# Patient Record
Sex: MALE | Race: WHITE | Employment: FULL TIME | URBAN - METROPOLITAN AREA
[De-identification: names, ages, dates, MRNs, and addresses within clinical notes are randomized per-mention and may not be internally consistent; named-entity substitution may affect disease eponyms.]

---

## 2017-05-30 ENCOUNTER — HOSPITAL ENCOUNTER (OUTPATIENT)
Dept: CT IMAGING | Age: 57
Discharge: HOME | End: 2017-05-30
Attending: PHYSICIAN ASSISTANT
Payer: COMMERCIAL

## 2017-05-30 DIAGNOSIS — R91.1 PULMONARY NODULE: ICD-10-CM

## 2017-05-30 PROCEDURE — 71250 CT THORAX DX C-: CPT

## 2017-12-18 ENCOUNTER — OFFICE VISIT (OUTPATIENT)
Dept: FAMILY MEDICINE CLINIC | Age: 57
End: 2017-12-18

## 2017-12-18 VITALS
OXYGEN SATURATION: 94 % | HEIGHT: 73 IN | DIASTOLIC BLOOD PRESSURE: 76 MMHG | TEMPERATURE: 98.1 F | BODY MASS INDEX: 33.5 KG/M2 | WEIGHT: 252.8 LBS | SYSTOLIC BLOOD PRESSURE: 114 MMHG | RESPIRATION RATE: 16 BRPM | HEART RATE: 82 BPM

## 2017-12-18 DIAGNOSIS — R31.21 ASYMPTOMATIC MICROSCOPIC HEMATURIA: ICD-10-CM

## 2017-12-18 DIAGNOSIS — E66.9 OBESITY (BMI 30-39.9): ICD-10-CM

## 2017-12-18 DIAGNOSIS — E78.00 HYPERCHOLESTEROLEMIA: Chronic | ICD-10-CM

## 2017-12-18 DIAGNOSIS — Z00.00 ROUTINE GENERAL MEDICAL EXAMINATION AT A HEALTH CARE FACILITY: Primary | ICD-10-CM

## 2017-12-18 DIAGNOSIS — Z23 ENCOUNTER FOR IMMUNIZATION: ICD-10-CM

## 2017-12-18 DIAGNOSIS — Z12.11 SCREEN FOR COLON CANCER: ICD-10-CM

## 2017-12-18 DIAGNOSIS — L84 CORN OF FOOT: ICD-10-CM

## 2017-12-18 DIAGNOSIS — R15.9 FUNCTIONAL FECAL INCONTINENCE: ICD-10-CM

## 2017-12-18 LAB
BILIRUB UR QL STRIP: NEGATIVE
GLUCOSE UR-MCNC: NEGATIVE MG/DL
KETONES P FAST UR STRIP-MCNC: NEGATIVE MG/DL
PH UR STRIP: 6.5 [PH] (ref 4.6–8)
PROT UR QL STRIP: NEGATIVE
SP GR UR STRIP: 1.02 (ref 1–1.03)
UA UROBILINOGEN AMB POC: NORMAL (ref 0.2–1)
URINALYSIS CLARITY POC: CLEAR
URINALYSIS COLOR POC: YELLOW
URINE BLOOD POC: NORMAL
URINE LEUKOCYTES POC: NEGATIVE
URINE NITRITES POC: NEGATIVE

## 2017-12-18 NOTE — LETTER
12/18/2017 9:25 AM 
 
Mr. Zahira Hancock [address] [place] [address], [phone] To Whom It May Concern: 
 
Zahira Hancock is currently under the care of WES[facility] Roberts. He was seen on 12/18/17 for an annual physical exam. 
 
 
Sincerely, Merced Judd NP

## 2017-12-18 NOTE — PATIENT INSTRUCTIONS
Vaccine Information Statement    Influenza (Flu) Vaccine (Inactivated or Recombinant): What you need to know    Many Vaccine Information Statements are available in Indonesian and other languages. See www.immunize.org/vis  Hojas de Información Sobre Vacunas están disponibles en Español y en muchos otros idiomas. Visite www.immunize.org/vis    1. Why get vaccinated? Influenza (flu) is a contagious disease that spreads around the [place] every year, usually between October and May. Flu is caused by influenza viruses, and is spread mainly by coughing, sneezing, and close contact. Anyone can get flu. Flu strikes suddenly and can last several days. Symptoms vary by age, but can include:   fever/chills   sore throat   muscle aches   fatigue   cough   headache    runny or stuffy nose    Flu can also lead to pneumonia and blood infections, and cause diarrhea and seizures in children. If you have a medical condition, such as heart or lung disease, flu can make it worse. Flu is more dangerous for some people. Infants and young children, people 72years of age and older, pregnant women, and people with certain health conditions or a weakened immune system are at greatest risk. Each year thousands of people in the [facility] die from flu, and many more are hospitalized. Flu vaccine can:   keep you from getting flu,   make flu less severe if you do get it, and   keep you from spreading flu to your family and other people. 2. Inactivated and recombinant flu vaccines    A dose of flu vaccine is recommended every flu season. Children 6 months through 6years of age may need two doses during the same flu season. Everyone else needs only one dose each flu season.        Some inactivated flu vaccines contain a very small amount of a mercury-based preservative called thimerosal. Studies have not shown thimerosal in vaccines to be harmful, but flu vaccines that do not contain thimerosal are available. There is no live flu virus in flu shots. They cannot cause the flu. There are many flu viruses, and they are always changing. Each year a new flu vaccine is made to protect against three or four viruses that are likely to cause disease in the upcoming flu season. But even when the vaccine doesnt exactly match these viruses, it may still provide some protection    Flu vaccine cannot prevent:   flu that is caused by a virus not covered by the vaccine, or   illnesses that look like flu but are not. It takes about 2 weeks for protection to develop after vaccination, and protection lasts through the flu season. 3. Some people should not get this vaccine    Tell the person who is giving you the vaccine:     If you have any severe, life-threatening allergies. If you ever had a life-threatening allergic reaction after a dose of flu vaccine, or have a severe allergy to any part of this vaccine, you may be advised not to get vaccinated. Most, but not all, types of flu vaccine contain a small amount of egg protein.  If you ever had Guillain-Barré Syndrome (also called GBS). Some people with a history of GBS should not get this vaccine. This should be discussed with your doctor.  If you are not feeling well. It is usually okay to get flu vaccine when you have a mild illness, but you might be asked to come back when you feel better. 4. Risks of a vaccine reaction    With any medicine, including vaccines, there is a chance of reactions. These are usually mild and go away on their own, but serious reactions are also possible. Most people who get a flu shot do not have any problems with it.      Minor problems following a flu shot include:    soreness, redness, or swelling where the shot was given     hoarseness   sore, red or itchy eyes   cough   fever   aches   headache   itching   fatigue  If these problems occur, they usually begin soon after the shot and last 1 or 2 days. More serious problems following a flu shot can include the following:     There may be a small increased risk of Guillain-Barré Syndrome (GBS) after inactivated flu vaccine. This risk has been estimated at 1 or 2 additional cases per million people vaccinated. This is much lower than the risk of severe complications from flu, which can be prevented by flu vaccine.  Young children who get the flu shot along with pneumococcal vaccine (PCV13) and/or DTaP vaccine at the same time might be slightly more likely to have a seizure caused by fever. Ask your doctor for more information. Tell your doctor if a child who is getting flu vaccine has ever had a seizure. Problems that could happen after any injected vaccine:      People sometimes faint after a medical procedure, including vaccination. Sitting or lying down for about 15 minutes can help prevent fainting, and injuries caused by a fall. Tell your doctor if you feel dizzy, or have vision changes or ringing in the ears.  Some people get severe pain in the shoulder and have difficulty moving the arm where a shot was given. This happens very rarely.  Any medication can cause a severe allergic reaction. Such reactions from a vaccine are very rare, estimated at about 1 in a million doses, and would happen within a few minutes to a few hours after the vaccination. As with any medicine, there is a very remote chance of a vaccine causing a serious injury or death. The safety of vaccines is always being monitored. For more information, visit: www.cdc.gov/vaccinesafety/    5. What if there is a serious reaction? What should I look for?  Look for anything that concerns you, such as signs of a severe allergic reaction, very high fever, or unusual behavior.     Signs of a severe allergic reaction can include hives, swelling of the face and throat, difficulty breathing, a fast heartbeat, dizziness, and weakness - usually within a few minutes to a few hours after the vaccination. What should I do?  If you think it is a severe allergic reaction or other emergency that cant wait, call 9-1-1 and get the person to the nearest hospital. Otherwise, call your doctor.  Reactions should be reported to the Vaccine Adverse Event Reporting System (VAERS). Your doctor should file this report, or you can do it yourself through  the VAERS web site at www.vaers. [facility].gov, or by calling 3-591.609.3022. VAERS does not give medical advice. 6. The National Vaccine Injury Compensation Program    The [facility] Vaccine Injury Compensation Program (VICP) is a federal program that was created to compensate people who may have been injured by certain vaccines. Persons who believe they may have been injured by a vaccine can learn about the program and about filing a claim by calling 0-844.196.9565 or visiting the [employer] website at www.[facility].gov/vaccinecompensation. There is a time limit to file a claim for compensation. 7. How can I learn more?  Ask your healthcare provider. He or she can give you the vaccine package insert or suggest other sources of information.  Call your local or state health department.  Contact the Centers for Disease Control and Prevention (CDC):  - Call 6-220.439.7713 (1-800-CDC-INFO) or  - Visit CDCs website at www.cdc.gov/flu    Vaccine Information Statement   Inactivated Influenza Vaccine   8/7/2015  [address] [id number]    Department of Health and Human Services  Centers for Disease Control and Prevention    Office Use Only       Blood in the Urine: Care Instructions  Your Care Instructions    Blood in the urine, or hematuria, may make the urine look red, brown, or pink. There may be blood every time you urinate or just from time to time. You cannot always see blood in the urine, but it will show up in a urine test.  Blood in the urine may be serious. It should always be checked by a doctor.  Your doctor may recommend more tests, including an X-ray, a CT scan, or a cystoscopy (which lets a doctor look inside the urethra and bladder). Blood in the urine can be a sign of another problem. Common causes are bladder infections and kidney stones. An injury to your groin or your genital area can also cause bleeding in the urinary tract. Very hard exercise-such as running a marathon-can cause blood in the urine. Blood in the urine can also be a sign of kidney disease or cancer in the bladder or kidney. Many cases of blood in the urine are caused by a harmless condition that runs in families. This is called benign familial hematuria. It does not need any treatment. Sometimes your urine may look red or brown even though it does not contain blood. For example, not getting enough fluids (dehydration), taking certain medicines, or having a liver problem can change the color of your urine. Eating foods such as beets, rhubarb, or blackberries or foods with red food coloring can make your urine look red or pink. Follow-up care is a key part of your treatment and safety. Be sure to make and go to all appointments, and call your doctor if you are having problems. It's also a good idea to know your test results and keep a list of the medicines you take. When should you call for help? Call your doctor now or seek immediate medical care if:  · You have symptoms of a urinary infection. For example:  ¨ You have pus in your urine. ¨ You have pain in your back just below your rib cage. This is called flank pain. ¨ You have a fever, chills, or body aches. ¨ It hurts to urinate. ¨ You have groin or belly pain. · You have more blood in your urine. Watch closely for changes in your health, and be sure to contact your doctor if:  · You have new urination problems. · You do not get better as expected. Where can you learn more? Go to http://javed-shelton.info/.   Enter N627 in the search box to learn more about \"Blood in the Urine: Care Instructions. \"  Current as of: May 12, 2017  Content Version: 11.4  © 3919-5674 Healthwise, [employer]. Care instructions adapted under license by [employer] (which disclaims liability or warranty for this information). If you have questions about a medical condition or this instruction, always ask your healthcare professional. [place] [address] any warranty or liability for your use of this information.

## 2017-12-18 NOTE — PROGRESS NOTES
Chief Complaint   Patient presents with    Complete Physical    Foot Pain     patient states he has pain at the bottom of his left foot- comes and goes- started 2 month ago- 7/10 pain    Back Pain     patient states he has a tightening pain in his lower back- started 1 week ago- 6/10 pain       1. Have you been to the ER, urgent care clinic since your last visit? Hospitalized since your last visit? No    2. Have you seen or consulted any other health care providers outside of the [address] [place] [address] since your last visit? Include any pap smears or colon screening.  Yes- [facility]- 09/27/17

## 2017-12-18 NOTE — PROGRESS NOTES
Ana Garcia is a 62 y.o. male who was seen in clinic today (12/18/2017). Subjective:  Cardiovascular Review:  The patient has hyperlipidemia and obesity. Diet and Lifestyle: not attempting to follow a low fat, low cholesterol diet, not attempting to follow a low sodium diet, sedentary, nonsmoker  Home BP Monitoring: is not measured at home. Pertinent ROS: no TIA's, no chest pain on exertion, no dyspnea on exertion, no swelling of ankles. Patient reports ongoing fecal incontinence. Reports bending over, cough and straining frequently causing incontinence of stool. Reports normal bowel movements daily. Denies bleeding. Last colonoscopy at age 47 yo. Asthma Review:  The patient is being seen for follow up of asthma, not currently in exacerbation. Asthma symptoms occur: weekly, infrequently. Wheezing when present is described as mild-moderate and easily relieved with rescue bronchodilator. Current limitations in activity from asthma: none. Frequency of use of quick-relief meds: monthly. Using [employer] twice daily. Seen by pulmonology, Dr Shavon Vazquez. Patient does not smoke cigarettes. Foot Pain  Patient complains of left foot pain. Onset of the symptoms was 2 months ago. Inciting event: none known. Current symptoms include ability to bear weight, but with some pain and pain at the plantar aspect of the foot. Aggravating symptoms: standing, walking. Patient's overall course: symptoms have progressed to a point and plateaued. Prior to Admission medications    Medication Sig Start Date End Date Taking? Authorizing Provider   mometasone-formoterol (DULERA) 100-5 mcg/actuation HFA inhaler Take 2 Puffs by inhalation two (2) times a day. Yes Historical Provider   PROAIR HFA 90 mcg/actuation inhaler INHALE 2 INHALATIONS BY MOUTH EVERY 6 HOURS AS NEEDED FOR WHEEZING OR SHORTNESS OF BREATH 10/19/16  Yes Deepak Pickens, NP   OTHER Prime Secretagogue. Mix 1 scoop in 8-12 fl oz of water 6 times a week. Yes Historical Provider   ANTIOXIDANT cap Take 1 Packet by mouth daily. OPC 3   Yes Historical Provider   calcium-cholecalciferol, d3, (CALCIUM 600 + D) 600-125 mg-unit tab Take 1 Cap by mouth daily. Yes Historical Provider   ascorbic acid (VITAMIN C) 500 mg tablet Take 500 mg by mouth daily. Yes Historical Provider   naproxen (NAPROSYN) 500 mg tablet Take 1 Tab by mouth two (2) times daily (with meals). Patient taking differently: Take 500 mg by mouth two (2) times daily as needed. 11/4/15   Terrie Ta NP          Allergies   Allergen Reactions    Cortisone (Hydrocortisone) [Hydrocortisone] Herpetiformis Dermatitis     Reaction with eyes    Zocor [Simvastatin] Other (comments)     Bilateral leg cramps           Review of Systems   Constitutional: Negative for malaise/fatigue and weight loss. HENT: Negative for hearing loss. Eyes: Negative for blurred vision. Respiratory: Negative for shortness of breath. Cardiovascular: Negative for chest pain, palpitations and leg swelling. Gastrointestinal: Negative for abdominal pain, constipation, diarrhea and heartburn. Genitourinary: Negative for frequency and urgency. Musculoskeletal: Positive for back pain. Negative for joint pain and myalgias. Neurological: Negative for dizziness, weakness and headaches. Endo/Heme/Allergies: Does not bruise/bleed easily. Psychiatric/Behavioral: Negative for depression. All other systems reviewed and are negative. Objective:   Physical Exam   Constitutional: He is oriented to person, place, and time. He appears well-developed and well-nourished. No distress. HENT:   Right Ear: Tympanic membrane and ear canal normal.   Left Ear: Tympanic membrane and ear canal normal.   Nose: No mucosal edema. Right sinus exhibits no maxillary sinus tenderness and no frontal sinus tenderness. Left sinus exhibits no maxillary sinus tenderness and no frontal sinus tenderness.    Mouth/Throat: Oropharynx is clear and moist.   Eyes: EOM are normal. Pupils are equal, round, and reactive to light. Neck: Normal range of motion. Neck supple. No JVD present. Carotid bruit is not present. No thyromegaly present. Cardiovascular: Normal rate, regular rhythm, normal heart sounds and intact distal pulses. Exam reveals no gallop and no friction rub. No murmur heard. Pulmonary/Chest: Effort normal and breath sounds normal. No respiratory distress. He has no decreased breath sounds. He has no wheezes. He has no rhonchi. Abdominal: Soft. Bowel sounds are normal. He exhibits no distension. There is no tenderness. Musculoskeletal: He exhibits no edema. Feet:    Lymphadenopathy:     He has no cervical adenopathy. Neurological: He is alert and oriented to person, place, and time. Psychiatric: He has a normal mood and affect. His behavior is normal.   Nursing note and vitals reviewed. Urine dipstick shows positive for red blood cells. Visit Vitals    /76 (BP 1 Location: Left arm, BP Patient Position: Sitting)    Pulse 82    Temp 98.1 °F (36.7 °C) (Oral)    Resp 16    Ht 6' 1\" (1.854 m)    Wt 252 lb 12.8 oz (114.7 kg)    SpO2 94%    BMI 33.35 kg/m2       Assessment & Plan:  Diagnoses and all orders for this visit:    1. Routine general medical examination at a health care facility  -     CBC W/O DIFF  -     METABOLIC PANEL, COMPREHENSIVE  -     LIPID PANEL  -     HEMOGLOBIN A1C W/O EAG  -     PSA W/ REFLX FREE PSA  -     AMB POC URINALYSIS DIP STICK AUTO W/O MICRO    2. Obesity (BMI 30-39. 9)  Discussed need for weight loss through diet and exercise. Reviewed decreased caloric intake and increased activity. 3. Asymptomatic microscopic hematuria  -     REFERRAL TO UROLOGY    4. Hypercholesterolemia  Check labs. Reviewed diet and lifestyle changes. 5. Functional fecal incontinence  -     REFERRAL TO GASTROENTEROLOGY    6. [place] of foot  -     REFERRAL TO PODIATRY    7.  Encounter for immunization  - Influenza virus vaccine (QUADRIVALENT PRES FREE SYRINGE) IM (73514)  -     ME IMMUNIZ ADMIN,1 SINGLE/COMB VAC/TOXOID    8. Screen for colon cancer  -     REFERRAL TO GASTROENTEROLOGY      I have discussed the diagnosis with the patient and the intended plan as seen in the above orders. The patient has received an after-visit summary along with patient information handout. I have discussed medication side effects and warnings with the patient as well. Follow-up Disposition:  Return in about 1 year (around 12/18/2018) for Annual Exam - 30 minutes.         Bright Falcon, NP

## 2017-12-18 NOTE — MR AVS SNAPSHOT
Visit Information Date & Time Provider Department Dept. Phone Encounter #  
 12/18/2017  8:30 AM Cherie Posadas  [facility] [phone] [id number] Follow-up Instructions Return in about 1 year (around 12/18/2018) for Annual Exam - 30 minutes. Upcoming Health Maintenance Date Due DTaP/Tdap/Td series (1 - Tdap) 1/17/1981 Influenza Age 5 to Adult 8/1/2017 COLONOSCOPY 1/26/2018* *Topic was postponed. The date shown is not the original due date. Allergies as of 12/18/2017  Review Complete On: 12/18/2017 By: Cherie Posadas NP Severity Noted Reaction Type Reactions Cortisone (Hydrocortisone) [Hydrocortisone] High 06/04/2015   Intolerance Herpetiformis Dermatitis Reaction with eyes Zocor [Simvastatin]  06/04/2015    Other (comments) Bilateral leg cramps Current Immunizations  Reviewed on 7/12/2016 Name Date Influenza Vaccine (Quad) PF  Incomplete, 11/4/2015 Not reviewed this visit You Were Diagnosed With   
  
 Codes Comments Routine general medical examination at a health care facility    -  Primary ICD-10-CM: Z00.00 ICD-9-CM: V70.0 Obesity (BMI 30-39. 9)     ICD-10-CM: E66.9 ICD-9-CM: 278.00 Asymptomatic microscopic hematuria     ICD-10-CM: R31.21 
ICD-9-CM: 599.72 Hypercholesterolemia     ICD-10-CM: E78.00 ICD-9-CM: 272.0 Functional fecal incontinence     ICD-10-CM: R15.9 ICD-9-CM: 787.60 Corn of foot     ICD-10-CM: L84 
ICD-9-CM: 150 Encounter for immunization     ICD-10-CM: K43 ICD-9-CM: V03.89 Screen for colon cancer     ICD-10-CM: Z12.11 ICD-9-CM: V76.51 Vitals BP Pulse Temp Resp Height(growth percentile) Weight(growth percentile) 114/76 (BP 1 Location: Left arm, BP Patient Position: Sitting) 82 98.1 °F (36.7 °C) (Oral) 16 6' 1\" (1.854 m) 252 lb 12.8 oz (114.7 kg) SpO2 BMI Smoking Status 94% 33.35 kg/m2 Never Smoker Vitals History BMI and BSA Data Body Mass Index Body Surface Area  
 33.35 kg/m 2 2.43 m 2 Preferred Pharmacy Pharmacy Name Phone [address] [facility], [facility] [facility] Micheal Daugherty [phone] Your Updated Medication List  
  
   
This list is accurate as of: 12/18/17  9:23 AM.  Always use your most recent med list.  
  
  
  
  
 ANTIOXIDANT Cap Generic drug:  vitamin a-vitamin c-vitamin e Take 1 Packet by mouth daily. OPC 3  
  
 CALCIUM 600 + D 600-125 mg-unit Tab Generic drug:  calcium-cholecalciferol (d3) Take 1 Cap by mouth daily. DULERA 100-5 mcg/actuation HFA inhaler Generic drug:  mometasone-formoterol Take 2 Puffs by inhalation two (2) times a day. naproxen 500 mg tablet Commonly known as:  NAPROSYN Take 1 Tab by mouth two (2) times daily (with meals). OTHER Prime Secretagogue. Mix 1 scoop in 8-12 fl oz of water 6 times a week. PROAIR HFA 90 mcg/actuation inhaler Generic drug:  albuterol INHALE 2 INHALATIONS BY MOUTH EVERY 6 HOURS AS NEEDED FOR WHEEZING OR SHORTNESS OF BREATH  
  
 VITAMIN C 500 mg tablet Generic drug:  ascorbic acid (vitamin C) Take 500 mg by mouth daily. We Performed the Following AMB POC URINALYSIS DIP STICK AUTO W/O MICRO [12101 CPT(R)] CBC W/O DIFF [29187 CPT(R)] HEMOGLOBIN A1C W/O EAG [89357 CPT(R)] INFLUENZA VIRUS VAC QUAD,SPLIT,PRESV FREE SYRINGE IM [id number] CPT(R)] LIPID PANEL [47869 CPT(R)] METABOLIC PANEL, COMPREHENSIVE [45522 CPT(R)] WV IMMUNIZ ADMIN,1 SINGLE/COMB VAC/TOXOID [id number] CPT(R)] PSA W/ REFLX FREE PSA [01193 CPT(R)] REFERRAL TO GASTROENTEROLOGY [YEH37 Custom] Comments:  
 Please evaluate patient for colonoscopy and fecal incontinence. REFERRAL TO PODIATRY [REF90 Custom] REFERRAL TO UROLOGY [VMX068 Custom] Follow-up Instructions Return in about 1 year (around 12/18/2018) for Annual Exam - 30 minutes. To-Do List   
 06/05/2018 11:30 AM  
  Appointment with [address] [facility] [address] ER 1 at [address] [place] [address] [address] ([phone]) NON-CONTRAST STUDY: 1. Bring any non [facility] facility films/images pertaining to the area of interest with you on the day of appointment. 2. Check in at registration at least 30 minutes before appt time unless you were instructed to do otherwise. 3. If you have to drink oral contrast please pick it up any weekday prior to your appointment, if you cannot please check in 2 hrs  before appt time. Referral Information Referral ID Referred By Referred To  
  
 [id number] [facility] [address]   
   [facility], [address] Visits Status Start Date End Date 1 New Request 12/18/17 12/18/18 If your referral has a status of pending review or denied, additional information will be sent to support the outcome of this decision. Referral ID Referred By Referred To  
 [id number] K[place], [address] [facility] [address] [facility], [address] Visits Status Start Date End Date 1 New Request 12/18/17 12/18/18 If your referral has a status of pending review or denied, additional information will be sent to support the outcome of this decision. Referral ID Referred By Referred To  
 [id number] KRYSTAL, 12 [facility] [place] [address] [facility], [address] Visits Status Start Date End Date 1 New Request 12/18/17 12/18/18 If your referral has a status of pending review or denied, additional information will be sent to support the outcome of this decision. Patient Instructions Vaccine Information Statement Influenza (Flu) Vaccine (Inactivated or Recombinant): What you need to know Many Vaccine Information Statements are available in German and other languages. See www.immunize.org/vis Hojas de Información Sobre Vacunas están disponibles en Español y en muchos otros idiomas. Visite www.immunize.org/vis 1. Why get vaccinated? Influenza (flu) is a contagious disease that spreads around the [place] every year, usually between October and May. Flu is caused by influenza viruses, and is spread mainly by coughing, sneezing, and close contact. Anyone can get flu. Flu strikes suddenly and can last several days. Symptoms vary by age, but can include: 
 fever/chills  sore throat  muscle aches  fatigue  cough  headache  runny or stuffy nose Flu can also lead to pneumonia and blood infections, and cause diarrhea and seizures in children. If you have a medical condition, such as heart or lung disease, flu can make it worse. Flu is more dangerous for some people. Infants and young children, people 72years of age and older, pregnant women, and people with certain health conditions or a weakened immune system are at greatest risk. Each year thousands of people in the [facility] die from flu, and many more are hospitalized. Flu vaccine can: 
 keep you from getting flu, 
 make flu less severe if you do get it, and 
 keep you from spreading flu to your family and other people. 2. Inactivated and recombinant flu vaccines A dose of flu vaccine is recommended every flu season. Children 6 months through 6years of age may need two doses during the same flu season. Everyone else needs only one dose each flu season. Some inactivated flu vaccines contain a very small amount of a mercury-based preservative called thimerosal. Studies have not shown thimerosal in vaccines to be harmful, but flu vaccines that do not contain thimerosal are available. There is no live flu virus in flu shots. They cannot cause the flu. There are many flu viruses, and they are always changing.  Each year a new flu vaccine is made to protect against three or four viruses that are likely to cause disease in the upcoming flu season. But even when the vaccine doesnt exactly match these viruses, it may still provide some protection Flu vaccine cannot prevent: 
 flu that is caused by a virus not covered by the vaccine, or 
 illnesses that look like flu but are not. It takes about 2 weeks for protection to develop after vaccination, and protection lasts through the flu season. 3. Some people should not get this vaccine Tell the person who is giving you the vaccine:  If you have any severe, life-threatening allergies. If you ever had a life-threatening allergic reaction after a dose of flu vaccine, or have a severe allergy to any part of this vaccine, you may be advised not to get vaccinated. Most, but not all, types of flu vaccine contain a small amount of egg protein.  If you ever had Guillain-Barré Syndrome (also called GBS). Some people with a history of GBS should not get this vaccine. This should be discussed with your doctor.  If you are not feeling well. It is usually okay to get flu vaccine when you have a mild illness, but you might be asked to come back when you feel better. 4. Risks of a vaccine reaction With any medicine, including vaccines, there is a chance of reactions. These are usually mild and go away on their own, but serious reactions are also possible. Most people who get a flu shot do not have any problems with it. Minor problems following a flu shot include:  
 soreness, redness, or swelling where the shot was given  hoarseness  sore, red or itchy eyes  cough  fever  aches  headache  itching  fatigue If these problems occur, they usually begin soon after the shot and last 1 or 2 days. More serious problems following a flu shot can include the following:  There may be a small increased risk of Guillain-Barré Syndrome (GBS) after inactivated flu vaccine. This risk has been estimated at 1 or 2 additional cases per million people vaccinated. This is much lower than the risk of severe complications from flu, which can be prevented by flu vaccine.  Young children who get the flu shot along with pneumococcal vaccine (PCV13) and/or DTaP vaccine at the same time might be slightly more likely to have a seizure caused by fever. Ask your doctor for more information. Tell your doctor if a child who is getting flu vaccine has ever had a seizure. Problems that could happen after any injected vaccine:  People sometimes faint after a medical procedure, including vaccination. Sitting or lying down for about 15 minutes can help prevent fainting, and injuries caused by a fall. Tell your doctor if you feel dizzy, or have vision changes or ringing in the ears.  Some people get severe pain in the shoulder and have difficulty moving the arm where a shot was given. This happens very rarely.  Any medication can cause a severe allergic reaction. Such reactions from a vaccine are very rare, estimated at about 1 in a million doses, and would happen within a few minutes to a few hours after the vaccination. As with any medicine, there is a very remote chance of a vaccine causing a serious injury or death. The safety of vaccines is always being monitored. For more information, visit: www.cdc.gov/vaccinesafety/ 
 
5. What if there is a serious reaction? What should I look for?  Look for anything that concerns you, such as signs of a severe allergic reaction, very high fever, or unusual behavior. Signs of a severe allergic reaction can include hives, swelling of the face and throat, difficulty breathing, a fast heartbeat, dizziness, and weakness  usually within a few minutes to a few hours after the vaccination. What should I do?  
 
 If you think it is a severe allergic reaction or other emergency that cant wait, call 9-1-1 and get the person to the nearest hospital. Otherwise, call your doctor.  Reactions should be reported to the Vaccine Adverse Event Reporting System (VAERS). Your doctor should file this report, or you can do it yourself through  the VAERS web site at www.vaers. [facility].gov, or by calling 5-620.984.2291. VAERS does not give medical advice. 6. The National Vaccine Injury Compensation Program 
 
The [facility] Vaccine Injury Compensation Program (VICP) is a federal program that was created to compensate people who may have been injured by certain vaccines. Persons who believe they may have been injured by a vaccine can learn about the program and about filing a claim by calling 1-881.455.2024 or visiting the [employer] website at www.[facility].gov/vaccinecompensation. There is a time limit to file a claim for compensation. 7. How can I learn more?  Ask your healthcare provider. He or she can give you the vaccine package insert or suggest other sources of information.  Call your local or state health department.  Contact the Centers for Disease Control and Prevention (CDC): 
- Call 2-166.803.8070 (1-800-CDC-INFO) or 
- Visit CDCs website at www.cdc.gov/flu Vaccine Information Statement Inactivated Influenza Vaccine 8/7/2015 
[address] [id number]-27 [facility] and [employer] Centers for Disease Control and Prevention Office Use Only Blood in the Urine: Care Instructions Your Care Instructions Blood in the urine, or hematuria, may make the urine look red, brown, or pink. There may be blood every time you urinate or just from time to time. You cannot always see blood in the urine, but it will show up in a urine test. 
Blood in the urine may be serious. It should always be checked by a doctor. Your doctor may recommend more tests, including an X-ray, a CT scan, or a cystoscopy (which lets a doctor look inside the urethra and bladder). Blood in the urine can be a sign of another problem. Common causes are bladder infections and kidney stones. An injury to your groin or your genital area can also cause bleeding in the urinary tract. Very hard exercise-such as running a marathon-can cause blood in the urine. Blood in the urine can also be a sign of kidney disease or cancer in the bladder or kidney. Many cases of blood in the urine are caused by a harmless condition that runs in families. This is called benign familial hematuria. It does not need any treatment. Sometimes your urine may look red or brown even though it does not contain blood. For example, not getting enough fluids (dehydration), taking certain medicines, or having a liver problem can change the color of your urine. Eating foods such as beets, rhubarb, or blackberries or foods with red food coloring can make your urine look red or pink. Follow-up care is a key part of your treatment and safety. Be sure to make and go to all appointments, and call your doctor if you are having problems. It's also a good idea to know your test results and keep a list of the medicines you take. When should you call for help? Call your doctor now or seek immediate medical care if: 
· You have symptoms of a urinary infection. For example: ¨ You have pus in your urine. ¨ You have pain in your back just below your rib cage. This is called flank pain. ¨ You have a fever, chills, or body aches. ¨ It hurts to urinate. ¨ You have groin or belly pain. · You have more blood in your urine. Watch closely for changes in your health, and be sure to contact your doctor if: 
· You have new urination problems. · You do not get better as expected. Where can you learn more? Go to http://javed-shelton.info/. Enter U624 in the search box to learn more about \"Blood in the Urine: Care Instructions. \" Current as of: May 12, 2017 Content Version: 11.4 © 0009-8749 Healthwise, Incorporated. Care instructions adapted under license by [employer] (which disclaims liability or warranty for this information). If you have questions about a medical condition or this instruction, always ask your healthcare professional. [address] any warranty or liability for your use of this information. Introducing [facility]! Dear Zohra: Thank you for requesting a [employer] account. Our records indicate that you already have an active Builk account. You can access your account anytime at [email or web address] Did you know that you can access your hospital and ER discharge instructions at any time in Builk? You can also review all of your test results from your hospital stay or ER visit. Additional Information If you have questions, please visit the Frequently Asked Questions section of the [employer] website at [email or web address] Remember, Builk is NOT to be used for urgent needs. For medical emergencies, dial 911. Now available from your iPhone and Android! Please provide this summary of care documentation to your next provider. Your primary care clinician is listed as Shen Kerns. If you have any questions after today's visit, please call [phone].

## 2017-12-19 LAB
ALBUMIN SERPL-MCNC: 4.7 G/DL (ref 3.5–5.5)
ALBUMIN/GLOB SERPL: 1.7 {RATIO} (ref 1.2–2.2)
ALP SERPL-CCNC: 78 IU/L (ref 39–117)
ALT SERPL-CCNC: 19 IU/L (ref 0–44)
AST SERPL-CCNC: 14 IU/L (ref 0–40)
BILIRUB SERPL-MCNC: 0.3 MG/DL (ref 0–1.2)
BUN SERPL-MCNC: 19 MG/DL (ref 6–24)
BUN/CREAT SERPL: 15 (ref 9–20)
CALCIUM SERPL-MCNC: 9.5 MG/DL (ref 8.7–10.2)
CHLORIDE SERPL-SCNC: 98 MMOL/L (ref 96–106)
CHOLEST SERPL-MCNC: 264 MG/DL (ref 100–199)
CO2 SERPL-SCNC: 26 MMOL/L (ref 18–29)
CREAT SERPL-MCNC: 1.24 MG/DL (ref 0.76–1.27)
ERYTHROCYTE [DISTWIDTH] IN BLOOD BY AUTOMATED COUNT: 13.6 % (ref 12.3–15.4)
GFR SERPLBLD CREATININE-BSD FMLA CKD-EPI: 64 ML/MIN/1.73
GFR SERPLBLD CREATININE-BSD FMLA CKD-EPI: 74 ML/MIN/1.73
GLOBULIN SER CALC-MCNC: 2.8 G/DL (ref 1.5–4.5)
GLUCOSE SERPL-MCNC: 85 MG/DL (ref 65–99)
HBA1C MFR BLD: 5.5 % (ref 4.8–5.6)
HCT VFR BLD AUTO: 41.8 % (ref 37.5–51)
HDLC SERPL-MCNC: 51 MG/DL
HGB BLD-MCNC: 14.6 G/DL (ref 13–17.7)
INTERPRETATION, 910389: NORMAL
LDLC SERPL CALC-MCNC: 181 MG/DL (ref 0–99)
MCH RBC QN AUTO: 33 PG (ref 26.6–33)
MCHC RBC AUTO-ENTMCNC: 34.9 G/DL (ref 31.5–35.7)
MCV RBC AUTO: 95 FL (ref 79–97)
PLATELET # BLD AUTO: 234 X10E3/UL (ref 150–379)
POTASSIUM SERPL-SCNC: 4.7 MMOL/L (ref 3.5–5.2)
PROT SERPL-MCNC: 7.5 G/DL (ref 6–8.5)
PSA SERPL-MCNC: 0.9 NG/ML (ref 0–4)
RBC # BLD AUTO: 4.42 X10E6/UL (ref 4.14–5.8)
REFLEX CRITERIA: NORMAL
SODIUM SERPL-SCNC: 141 MMOL/L (ref 134–144)
TRIGL SERPL-MCNC: 161 MG/DL (ref 0–149)
VLDLC SERPL CALC-MCNC: 32 MG/DL (ref 5–40)
WBC # BLD AUTO: 7.6 X10E3/UL (ref 3.4–10.8)

## 2017-12-20 DIAGNOSIS — E78.00 HYPERCHOLESTEROLEMIA: Primary | Chronic | ICD-10-CM

## 2017-12-20 RX ORDER — ROSUVASTATIN CALCIUM 10 MG/1
10 TABLET, COATED ORAL
Qty: 30 TAB | Refills: 5 | Status: SHIPPED | OUTPATIENT
Start: 2017-12-20 | End: 2018-09-07

## 2018-01-16 ENCOUNTER — TELEPHONE (OUTPATIENT)
Dept: FAMILY MEDICINE CLINIC | Age: 58
End: 2018-01-16

## 2018-01-16 NOTE — TELEPHONE ENCOUNTER
----- Message from Danny Oneil sent at 1/16/2018 12:59 PM EST -----  Regarding: KATELYN Phillips/Ellie Mejia recent lab and office notes from 12/18/18 to [facility] specialists ([phone]). Needed by appt date of 1/19/18.     F:[phone]

## 2018-01-16 NOTE — TELEPHONE ENCOUNTER
[phone] notified [place] that office notes and labs were already faxed to [phone] this morning [place] understand

## 2018-04-08 ENCOUNTER — HOSPITAL ENCOUNTER (EMERGENCY)
Age: 58
Discharge: HOME | End: 2018-04-08
Attending: EMERGENCY MEDICINE
Payer: COMMERCIAL

## 2018-04-08 ENCOUNTER — APPOINTMENT (OUTPATIENT)
Dept: GENERAL RADIOLOGY | Age: 58
End: 2018-04-08
Attending: PHYSICIAN ASSISTANT
Payer: COMMERCIAL

## 2018-04-08 VITALS
OXYGEN SATURATION: 95 % | SYSTOLIC BLOOD PRESSURE: 124 MMHG | HEART RATE: 83 BPM | DIASTOLIC BLOOD PRESSURE: 81 MMHG | RESPIRATION RATE: 16 BRPM | TEMPERATURE: 97.9 F

## 2018-04-08 DIAGNOSIS — M54.31 SCIATICA OF RIGHT SIDE: Primary | ICD-10-CM

## 2018-04-08 PROCEDURE — 99283 EMERGENCY DEPT VISIT LOW MDM: CPT

## 2018-04-08 PROCEDURE — 72100 X-RAY EXAM L-S SPINE 2/3 VWS: CPT

## 2018-04-08 PROCEDURE — 96372 THER/PROPH/DIAG INJ SC/IM: CPT

## 2018-04-08 PROCEDURE — 74011250636 HC RX REV CODE- 250/636: Performed by: PHYSICIAN ASSISTANT

## 2018-04-08 PROCEDURE — 74011250637 HC RX REV CODE- 250/637: Performed by: PHYSICIAN ASSISTANT

## 2018-04-08 RX ORDER — PREDNISONE 10 MG/1
TABLET ORAL
Qty: 1 PACKAGE | Refills: 0 | Status: SHIPPED | OUTPATIENT
Start: 2018-04-08 | End: 2018-09-07 | Stop reason: ALTCHOICE

## 2018-04-08 RX ORDER — DIAZEPAM 5 MG/1
5 TABLET ORAL
Status: COMPLETED | OUTPATIENT
Start: 2018-04-08 | End: 2018-04-08

## 2018-04-08 RX ORDER — TRAMADOL HYDROCHLORIDE 50 MG/1
50 TABLET ORAL
Qty: 20 TAB | Refills: 0 | Status: SHIPPED | OUTPATIENT
Start: 2018-04-08 | End: 2018-09-07

## 2018-04-08 RX ORDER — KETOROLAC TROMETHAMINE 30 MG/ML
60 INJECTION, SOLUTION INTRAMUSCULAR; INTRAVENOUS
Status: COMPLETED | OUTPATIENT
Start: 2018-04-08 | End: 2018-04-08

## 2018-04-08 RX ORDER — OXYCODONE AND ACETAMINOPHEN 5; 325 MG/1; MG/1
1 TABLET ORAL
Status: COMPLETED | OUTPATIENT
Start: 2018-04-08 | End: 2018-04-08

## 2018-04-08 RX ADMIN — KETOROLAC TROMETHAMINE 60 MG: 30 INJECTION, SOLUTION INTRAMUSCULAR at 13:14

## 2018-04-08 RX ADMIN — DIAZEPAM 5 MG: 5 TABLET ORAL at 13:15

## 2018-04-08 RX ADMIN — OXYCODONE HYDROCHLORIDE AND ACETAMINOPHEN 1 TABLET: 5; 325 TABLET ORAL at 13:15

## 2018-04-08 NOTE — ED PROVIDER NOTES
HPI Comments: 62 y.o. male with past medical history significant for arthritis, hypercholesterolemia, anal fissure, pulmonary nodule and asthma who presents from home with chief complaint of back pain. Per pt, he went to bend over yesterday evening and while doing so, experienced a \"shattering sensation\" over his lower back. The pt reports that he has remained with moderate-severe diffuse lower back pain since yesterday. Per pt, the pain is present with intermittent radiation down his posterior RLE at times, depending on his positioning. He rates his current pain 2/10. The pt makes it known that he has no hx of back operations in the past and is does not currently have a back specialist. Pt denies having hx of DM. He further denies fever, chills, N/V/D, CP, SOB, abd pain, bowel incontinence, dizziness, headache, numbness, urinary incontinence and other urinary symptoms. There are no other acute medical concerns at this time. Social hx: Non-smoker, Current ETOH consumption     PCP: Tc Brito NP    Note written by Trevon Cabrera, as dictated by Lorraine Sandhu MD 1:28 PM          The history is provided by the patient and the spouse. No  was used. Past Medical History:   Diagnosis Date    Anal fissure     Arthritis     Asthma     Hypercholesterolemia     Pulmonary nodule seen on imaging study 7/12/16    4 mm bilaterally, repeat CT in 1 year       Past Surgical History:   Procedure Laterality Date    HX ORTHOPAEDIC      bilateral hips         History reviewed. No pertinent family history. Social History     Social History    Marital status:      Spouse name: N/A    Number of children: N/A    Years of education: N/A     Occupational History    Not on file.      Social History Main Topics    Smoking status: Never Smoker    Smokeless tobacco: Never Used    Alcohol use 0.5 oz/week     1 Cans of beer per week    Drug use: No    Sexual activity: Yes Partners: Female     Other Topics Concern    Not on file     Social History Narrative         ALLERGIES: Cortisone (hydrocortisone) [hydrocortisone] and Zocor [simvastatin]    Review of Systems   Constitutional: Negative for appetite change, chills and fever. HENT: Negative for rhinorrhea, sore throat and trouble swallowing. Eyes: Negative for photophobia. Respiratory: Negative for cough and shortness of breath. Cardiovascular: Negative for chest pain and palpitations. Gastrointestinal: Negative for abdominal pain, diarrhea, nausea and vomiting. Genitourinary: Negative for dysuria, frequency and hematuria. Musculoskeletal: Positive for back pain (diffuse lower ). Negative for arthralgias and gait problem. Neurological: Negative for dizziness, syncope, weakness and numbness. Psychiatric/Behavioral: Negative for behavioral problems. The patient is not nervous/anxious. All other systems reviewed and are negative. Vitals:    04/08/18 1343   BP: 124/81   Pulse: 83   Resp: 16   Temp: 97.9 °F (36.6 °C)   SpO2: 95%            Physical Exam   Constitutional: He appears well-developed and well-nourished. HENT:   Head: Normocephalic and atraumatic. Mouth/Throat: Oropharynx is clear and moist.   Eyes: EOM are normal. Pupils are equal, round, and reactive to light. Neck: Normal range of motion. Neck supple. Cardiovascular: Normal rate, regular rhythm, normal heart sounds and intact distal pulses. Exam reveals no gallop and no friction rub. No murmur heard. Pulmonary/Chest: Effort normal. No respiratory distress. He has no wheezes. He has no rales. Abdominal: Soft. There is no tenderness. There is no rebound. Musculoskeletal: Normal range of motion. He exhibits no tenderness. Positive straight leg raise on right at 15 degrees. Negative foot drop. No sensory-motor deficits noted. Neurological: He is alert. No cranial nerve deficit. Motor; symmetric   Skin: No erythema. Psychiatric: He has a normal mood and affect. His behavior is normal.   Nursing note and vitals reviewed. Note written by Trevon Palacios, as dictated by Ralph Christianson MD 1:28 PM    [facility]      ED Course       Procedures

## 2018-04-08 NOTE — ED NOTES

## 2018-04-08 NOTE — LETTER
NOTIFICATION RETURN TO WORK / SCHOOL 
 
4/8/2018 1:55 PM 
 
Mr. Iliana Butt [address] [place] [address] To Whom It May Concern: 
 
Iliana Butt is currently under the care of [address]. He will return to work/school on: Devin  4/15/2018 If there are questions or concerns please have the patient contact our office. Sincerely, Madi Roberson.  Esau Hebert MD

## 2018-04-08 NOTE — ROUTINE PROCESS
Reviewed discharge instructions with the patient and patient's wfe, both of whom verbalized understanding and denied having any further questions. Scripts given.

## 2018-06-05 ENCOUNTER — HOSPITAL ENCOUNTER (OUTPATIENT)
Dept: CT IMAGING | Age: 58
Discharge: HOME | End: 2018-06-05
Attending: INTERNAL MEDICINE
Payer: COMMERCIAL

## 2018-06-05 DIAGNOSIS — R91.1 PULMONARY NODULE: ICD-10-CM

## 2018-06-05 PROCEDURE — 71250 CT THORAX DX C-: CPT

## 2018-09-07 ENCOUNTER — OFFICE VISIT (OUTPATIENT)
Dept: FAMILY MEDICINE CLINIC | Age: 58
End: 2018-09-07

## 2018-09-07 VITALS
OXYGEN SATURATION: 96 % | RESPIRATION RATE: 18 BRPM | HEIGHT: 73 IN | HEART RATE: 74 BPM | WEIGHT: 264 LBS | TEMPERATURE: 97.9 F | BODY MASS INDEX: 34.99 KG/M2 | DIASTOLIC BLOOD PRESSURE: 79 MMHG | SYSTOLIC BLOOD PRESSURE: 119 MMHG

## 2018-09-07 DIAGNOSIS — E78.2 MIXED HYPERLIPIDEMIA: ICD-10-CM

## 2018-09-07 DIAGNOSIS — H81.12 BPPV (BENIGN PAROXYSMAL POSITIONAL VERTIGO), LEFT: Primary | ICD-10-CM

## 2018-09-07 NOTE — PATIENT INSTRUCTIONS

## 2018-09-07 NOTE — MR AVS SNAPSHOT
[facility] 
 
 
 [address] [facility] [address] 
[phone] Patient: Miguel Ángel Quigley MRN: [id number] TH Visit Information Date & Time Provider Department Dept. Phone Encounter #  
 2018  3:30 PM Sarmad De León  [facility] [phone] [id number] Follow-up Instructions Return in about 3 months (around 2018) for Follow Up. Upcoming Health Maintenance Date Due DTaP/Tdap/Td series (1 - Tdap) 1981 Influenza Age 5 to Adult 10/7/2018* COLONOSCOPY 2028 *Topic was postponed. The date shown is not the original due date. Allergies as of 2018  Review Complete On: 2018 By: Niesha Oakley LPN Severity Noted Reaction Type Reactions Cortisone (Hydrocortisone) [Hydrocortisone] High 2015   Intolerance Herpetiformis Dermatitis Reaction with eyes Zocor [Simvastatin]  2015    Other (comments) Bilateral leg cramps Current Immunizations  Reviewed on 2016 Name Date Influenza Vaccine (Quad) PF 2017, 2015 Not reviewed this visit You Were Diagnosed With   
  
 Codes Comments BPPV (benign paroxysmal positional vertigo), left    -  Primary ICD-10-CM: H81.12 
ICD-9-CM: 386.11 Mixed hyperlipidemia     ICD-10-CM: E78.2 ICD-9-CM: 272.2 Vitals BP Pulse Temp Resp Height(growth percentile) Weight(growth percentile) 119/79 (BP 1 Location: Left arm, BP Patient Position: Sitting) 74 97.9 °F (36.6 °C) (Oral) 18 6' 1\" (1.854 m) 264 lb (119.7 kg) SpO2 BMI Smoking Status 96% 34.83 kg/m2 Never Smoker Vitals History BMI and BSA Data Body Mass Index Body Surface Area 34.83 kg/m 2 2.48 m 2 Preferred Pharmacy Pharmacy Name Phone [address] Micheal Martin 148 [phone] Your Updated Medication List  
  
   
 This list is accurate as of 9/7/18  4:14 PM.  Always use your most recent med list.  
  
  
  
  
 naproxen 500 mg tablet Commonly known as:  NAPROSYN Take 1 Tab by mouth two (2) times daily (with meals). PROAIR HFA 90 mcg/actuation inhaler Generic drug:  albuterol INHALE 2 INHALATIONS BY MOUTH EVERY 6 HOURS AS NEEDED FOR WHEEZING OR SHORTNESS OF BREATH Follow-up Instructions Return in about 3 months (around 12/7/2018) for Follow Up. Patient Instructions DASH Diet: Care Instructions Your Care Instructions The DASH diet is an eating plan that can help lower your blood pressure. DASH stands for Dietary Approaches to Stop Hypertension. Hypertension is high blood pressure. The DASH diet focuses on eating foods that are high in calcium, potassium, and magnesium. These nutrients can lower blood pressure. The foods that are highest in these nutrients are fruits, vegetables, low-fat dairy products, nuts, seeds, and legumes. But taking calcium, potassium, and magnesium supplements instead of eating foods that are high in those nutrients does not have the same effect. The DASH diet also includes whole grains, fish, and poultry. The DASH diet is one of several lifestyle changes your doctor may recommend to lower your high blood pressure. Your doctor may also want you to decrease the amount of sodium in your diet. Lowering sodium while following the DASH diet can lower blood pressure even further than just the DASH diet alone. Follow-up care is a key part of your treatment and safety. Be sure to make and go to all appointments, and call your doctor if you are having problems. It's also a good idea to know your test results and keep a list of the medicines you take. How can you care for yourself at home? Following the DASH diet · Eat 4 to 5 servings of fruit each day.  A serving is 1 medium-sized piece of fruit, ½ cup chopped or canned fruit, 1/4 cup dried fruit, or 4 ounces (½ cup) of fruit juice. Choose fruit more often than fruit juice. · Eat 4 to 5 servings of vegetables each day. A serving is 1 cup of lettuce or raw leafy vegetables, ½ cup of chopped or cooked vegetables, or 4 ounces (½ cup) of vegetable juice. Choose vegetables more often than vegetable juice. · Get 2 to 3 servings of low-fat and fat-free dairy each day. A serving is 8 ounces of milk, 1 cup of yogurt, or 1 ½ ounces of cheese. · Eat 6 to 8 servings of grains each day. A serving is 1 slice of bread, 1 ounce of dry cereal, or ½ cup of cooked rice, pasta, or cooked cereal. Try to choose whole-grain products as much as possible. · Limit lean meat, poultry, and fish to 2 servings each day. A serving is 3 ounces, about the size of a deck of cards. · Eat 4 to 5 servings of nuts, seeds, and legumes (cooked dried beans, lentils, and split peas) each week. A serving is 1/3 cup of nuts, 2 tablespoons of seeds, or ½ cup of cooked beans or peas. · Limit fats and oils to 2 to 3 servings each day. A serving is 1 teaspoon of vegetable oil or 2 tablespoons of salad dressing. · Limit sweets and added sugars to 5 servings or less a week. A serving is 1 tablespoon jelly or jam, ½ cup sorbet, or 1 cup of lemonade. · Eat less than 2,300 milligrams (mg) of sodium a day. If you limit your sodium to 1,500 mg a day, you can lower your blood pressure even more. Tips for success · Start small. Do not try to make dramatic changes to your diet all at once. You might feel that you are missing out on your favorite foods and then be more likely to not follow the plan. Make small changes, and stick with them. Once those changes become habit, add a few more changes. · Try some of the following: ¨ Make it a goal to eat a fruit or vegetable at every meal and at snacks. This will make it easy to get the recommended amount of fruits and vegetables each day. ¨ Try yogurt topped with fruit and nuts for a snack or healthy dessert. ¨ Add lettuce, tomato, cucumber, and onion to sandwiches. ¨ Combine a ready-made pizza crust with low-fat mozzarella cheese and lots of vegetable toppings. Try using tomatoes, squash, spinach, broccoli, carrots, cauliflower, and onions. ¨ Have a variety of cut-up vegetables with a low-fat dip as an appetizer instead of chips and dip. ¨ Sprinkle sunflower seeds or chopped almonds over salads. Or try adding chopped walnuts or almonds to cooked vegetables. ¨ Try some vegetarian meals using beans and peas. Add garbanzo or kidney beans to salads. Make burritos and tacos with mashed yanes beans or black beans. Where can you learn more? Go to http://[employer].shelton.info/. Enter A942 in the search box to learn more about \"DASH Diet: Care Instructions. \" Current as of: December 6, 2017 Content Version: 11.7 © 2586-5121 [employer]. Care instructions adapted under license by [employer] (which disclaims liability or warranty for this information). If you have questions about a medical condition or this instruction, always ask your healthcare professional. [address] any warranty or liability for your use of this information. [facility]ngel Sant[facility] Reversing Heart Disease Introducing [facility]! Dear Thi Cash: Thank you for requesting a [employer] Experience account. Our records indicate that you already have an active F[employer] Interactive Experience account. You can access your account anytime at [email or web address] Did you know that you can access your hospital and ER discharge instructions at any time in [employer] Experience? You can also review all of your test results from your hospital stay or ER visit. Additional Information If you have questions, please visit the Frequently Asked Questions section of the [employer] Experience website at [email or web address] Remember, MyChart is NOT to be used for urgent needs. For medical emergencies, dial 911. Now available from your iPhone and Android! Please provide this summary of care documentation to your next provider. Your primary care clinician is listed as Crystal Koch. If you have any questions after today's visit, please call [phone].

## 2018-09-08 NOTE — PROGRESS NOTES
Assessment/Plan:     Diagnoses and all orders for this visit:    1. BPPV (benign paroxysmal positional vertigo), left  Self-limiting. Discussed the Epley maneuver at home. Discussed symptomatic care. Follow-up if no improvement. 2. Mixed hyperlipidemia  He will start Crestor as previously recommended. He will follow-up in 3 months for evaluation. I have strongly urged dietary modification as well as routine exercise and weight loss. Patient states understanding. Follow-up Disposition:  Return in about 3 months (around 12/7/2018) for Follow Up. Discussed expected course/resolution/complications of diagnosis in detail with patient.    Medication risks/benefits/costs/interactions/alternatives discussed with patient.    Pt was given after visit summary which includes diagnoses, current medications & vitals. Pt expressed understanding with the diagnosis and plan          Subjective:      Shakeel Brown is a 62 y.o. male who presents for had concerns including Dizziness. Vertigo  Patient complains of rotary vertigo, unsteadiness. The symptoms started 2 days ago and are gradually improving. The attacks occur every a few minutes and last a fewseconds. Positions that worsen symptoms: turning head. Previous workup/treatments: none. Associated ear symptoms: none. Associated CNS symptoms: none. Recent infections: none. Head trauma: denied. Drug ingestion: none Noise exposure: no occupational exposure. Family history: non-contributory. Did not start Crestor as previously recommended by PCP. Current Outpatient Prescriptions   Medication Sig Dispense Refill    PROAIR HFA 90 mcg/actuation inhaler INHALE 2 INHALATIONS BY MOUTH EVERY 6 HOURS AS NEEDED FOR WHEEZING OR SHORTNESS OF BREATH 1 Inhaler 1    naproxen (NAPROSYN) 500 mg tablet Take 1 Tab by mouth two (2) times daily (with meals).  (Patient taking differently: Take 500 mg by mouth two (2) times daily as needed.) 20 Tab 0       Allergies Allergen Reactions    Cortisone (Hydrocortisone) [Hydrocortisone] Herpetiformis Dermatitis     Reaction with eyes    Zocor [Simvastatin] Other (comments)     Bilateral leg cramps       ROS:   Review of Systems   Constitutional: Negative for malaise/fatigue. Eyes: Negative for blurred vision. Respiratory: Negative for shortness of breath. Cardiovascular: Negative for chest pain. Neurological: Positive for dizziness. Objective:     Visit Vitals    /79 (BP 1 Location: Left arm, BP Patient Position: Sitting)    Pulse 74    Temp 97.9 °F (36.6 °C) (Oral)    Resp 18    Ht 6' 1\" (1.854 m)    Wt 264 lb (119.7 kg)    SpO2 96%    BMI 34.83 kg/m2       Vitals and Nurse Documentation reviewed. Physical Exam   Constitutional: No distress. HENT:   Right Ear: Tympanic membrane is not erythematous and not bulging. No middle ear effusion. Left Ear: Tympanic membrane is not erythematous and not bulging. No middle ear effusion. Nose: No rhinorrhea. Right sinus exhibits no maxillary sinus tenderness and no frontal sinus tenderness. Left sinus exhibits no maxillary sinus tenderness and no frontal sinus tenderness. Mouth/Throat: No oropharyngeal exudate or posterior oropharyngeal erythema. Eyes: EOM and lids are normal.   Cardiovascular: S1 normal and S2 normal.  Exam reveals no gallop and no friction rub. No murmur heard. Pulmonary/Chest: Breath sounds normal. He has no wheezes. Lymphadenopathy:     He has no cervical adenopathy. Neurological: He has intact cranial nerves. Lake-Hallpike positive on the left   Skin: Skin is warm and dry.    Psychiatric: Mood and affect normal.

## 2018-10-19 ENCOUNTER — OFFICE VISIT (OUTPATIENT)
Dept: FAMILY MEDICINE CLINIC | Age: 58
End: 2018-10-19

## 2018-10-19 VITALS
SYSTOLIC BLOOD PRESSURE: 121 MMHG | BODY MASS INDEX: 35.12 KG/M2 | OXYGEN SATURATION: 97 % | TEMPERATURE: 98.5 F | DIASTOLIC BLOOD PRESSURE: 67 MMHG | RESPIRATION RATE: 18 BRPM | WEIGHT: 265 LBS | HEIGHT: 73 IN | HEART RATE: 76 BPM

## 2018-10-19 DIAGNOSIS — J32.9 RHINOSINUSITIS: Primary | ICD-10-CM

## 2018-10-19 DIAGNOSIS — J31.0 RHINOSINUSITIS: Primary | ICD-10-CM

## 2018-10-19 DIAGNOSIS — J02.9 SORE THROAT: ICD-10-CM

## 2018-10-19 LAB
S PYO AG THROAT QL: NEGATIVE
VALID INTERNAL CONTROL?: YES

## 2018-10-19 RX ORDER — ROSUVASTATIN CALCIUM 10 MG/1
TABLET, COATED ORAL
Refills: 4 | COMMUNITY
Start: 2018-09-19 | End: 2019-01-13 | Stop reason: SDUPTHER

## 2018-10-19 RX ORDER — ALBUTEROL SULFATE 90 UG/1
AEROSOL, METERED RESPIRATORY (INHALATION)
Qty: 1 INHALER | Refills: 1 | Status: SHIPPED | OUTPATIENT
Start: 2018-10-19 | End: 2022-10-27 | Stop reason: SDUPTHER

## 2018-10-19 NOTE — PATIENT INSTRUCTIONS
Mucinex   Flonase 2 sprays to each nostril daily for 2 weeks  Rest, lots of water. Humidifier at night         Upper Respiratory Infection (Cold): Care Instructions  Your Care Instructions    An upper respiratory infection, or URI, is an infection of the nose, sinuses, or throat. URIs are spread by coughs, sneezes, and direct contact. The common cold is the most frequent kind of URI. The flu and sinus infections are other kinds of URIs. Almost all URIs are caused by viruses. Antibiotics won't cure them. But you can treat most infections with home care. This may include drinking lots of fluids and taking over-the-counter pain medicine. You will probably feel better in 4 to 10 days. The doctor has checked you carefully, but problems can develop later. If you notice any problems or new symptoms, get medical treatment right away. Follow-up care is a key part of your treatment and safety. Be sure to make and go to all appointments, and call your doctor if you are having problems. It's also a good idea to know your test results and keep a list of the medicines you take. How can you care for yourself at home? · To prevent dehydration, drink plenty of fluids, enough so that your urine is light yellow or clear like water. Choose water and other caffeine-free clear liquids until you feel better. If you have kidney, heart, or liver disease and have to limit fluids, talk with your doctor before you increase the amount of fluids you drink. · Take an over-the-counter pain medicine, such as acetaminophen (Tylenol), ibuprofen (Advil, Motrin), or naproxen (Aleve). Read and follow all instructions on the label. · Before you use cough and cold medicines, check the label. These medicines may not be safe for young children or for people with certain health problems. · Be careful when taking over-the-counter cold or flu medicines and Tylenol at the same time. Many of these medicines have acetaminophen, which is Tylenol.  Read the labels to make sure that you are not taking more than the recommended dose. Too much acetaminophen (Tylenol) can be harmful. · Get plenty of rest.  · Do not smoke or allow others to smoke around you. If you need help quitting, talk to your doctor about stop-smoking programs and medicines. These can increase your chances of quitting for good. When should you call for help? Call 911 anytime you think you may need emergency care. For example, call if:    · You have severe trouble breathing.    Call your doctor now or seek immediate medical care if:    · You seem to be getting much sicker.     · You have new or worse trouble breathing.     · You have a new or higher fever.     · You have a new rash.    Watch closely for changes in your health, and be sure to contact your doctor if:    · You have a new symptom, such as a sore throat, an earache, or sinus pain.     · You cough more deeply or more often, especially if you notice more mucus or a change in the color of your mucus.     · You do not get better as expected. Where can you learn more? Go to http://javed-shelton.info/. Enter L297 in the search box to learn more about \"Upper Respiratory Infection (Cold): Care Instructions. \"  Current as of: December 6, 2017  Content Version: 11.8  © 6192-5389 [employer]. Care instructions adapted under license by [facility] (which disclaims liability or warranty for this information). If you have questions about a medical condition or this instruction, always ask your healthcare professional. [place] [address] any warranty or liability for your use of this information.

## 2018-10-19 NOTE — PROGRESS NOTES
Chief Complaint   Patient presents with    Sore Throat     started 3 days ago with sore throat and dry cough. taking Nyquil and TheraFlu OTC.  Cough     \"REVIEWED RECORD IN PREPARATION FOR VISIT AND HAVE OBTAINED THE NECESSARY DOCUMENTATION\"  1. Have you been to the ER, urgent care clinic since your last visit? Hospitalized since your last visit? No    2. Have you seen or consulted any other health care providers outside of the [address] [place] [address] since your last visit? Include any pap smears or colon screening.  No

## 2018-10-19 NOTE — PROGRESS NOTES
[address] [facility] Note  Subjective:      Gurinder Monk is a 62 y.o. male who presents for an acute visit with the following chief complaints. Chief Complaint   Patient presents with    Sore Throat     started 3 days ago with sore throat and dry cough. taking Nyquil and TheraFlu OTC.  Cough     Upper Respiratory Infection  Patient complains of symptoms of a URI. Symptoms include congestion, sore throat and cough. Onset of symptoms was 3 days ago, unchanged since that time. He also c/o feeling feverish, runny nose  for the past 3 days. Cough is productive in the morning with clear phlegm. Denies CP or SOB. He is drinking plenty of fluids. Evaluation to date: none. Treatment to date: OTC products. History of asthma secondary to freon inhalation due to occupational exposure in 2016. Current Outpatient Medications   Medication Sig Dispense Refill    rosuvastatin (CRESTOR) 10 mg tablet TK 1 T PO Q NIGHT FOR CHOLESTEROL  4    albuterol (PROAIR HFA) 90 mcg/actuation inhaler INHALE 2 INHALATIONS BY MOUTH EVERY 6 HOURS AS NEEDED FOR WHEEZING OR SHORTNESS OF BREATH 1 Inhaler 1    naproxen (NAPROSYN) 500 mg tablet Take 1 Tab by mouth two (2) times daily (with meals). (Patient taking differently: Take 500 mg by mouth two (2) times daily as needed.) 20 Tab 0     Allergies   Allergen Reactions    Cortisone (Hydrocortisone) [Hydrocortisone] Herpetiformis Dermatitis     Reaction with eyes    Zocor [Simvastatin] Other (comments)     Bilateral leg cramps       ROS:   Complete review of systems was reviewed with pertinent information listed in HPI. Review of Systems   Constitutional: Negative for chills, diaphoresis, fever, malaise/fatigue and weight loss. Feeling feverish, warm but has not checked temperature   HENT: Positive for congestion and sinus pain. Negative for ear pain, hearing loss and tinnitus. Eyes: Negative for blurred vision.    Respiratory: Positive for cough and sputum production. Negative for shortness of breath and wheezing. Cardiovascular: Negative for chest pain and palpitations. Gastrointestinal: Negative for abdominal pain, diarrhea, heartburn, nausea and vomiting. Genitourinary: Negative for dysuria. Musculoskeletal: Negative for myalgias. Skin: Negative for rash. Neurological: Negative for dizziness and headaches. Objective:     Visit Vitals  /67 (BP 1 Location: Left arm, BP Patient Position: Sitting)   Pulse 76   Temp 98.5 °F (36.9 °C) (Oral)   Resp 18   Ht 6' 1\" (1.854 m)   Wt 265 lb (120.2 kg)   SpO2 97%   BMI 34.96 kg/m²       Vitals and Nurse Documentation reviewed. Physical Exam   Constitutional: He is oriented to person, place, and time and well-developed, well-nourished, and in no distress. He does not have a sickly appearance. HENT:   Head: Normocephalic and atraumatic. Right Ear: Hearing, external ear and ear canal normal. Tympanic membrane is not erythematous and not bulging. No middle ear effusion. Left Ear: Hearing, external ear and ear canal normal. Tympanic membrane is not erythematous and not bulging. No middle ear effusion. Nose: Mucosal edema present. No rhinorrhea. Right sinus exhibits no maxillary sinus tenderness and no frontal sinus tenderness. Left sinus exhibits no maxillary sinus tenderness and no frontal sinus tenderness. Mouth/Throat: Uvula is midline, oropharynx is clear and moist and mucous membranes are normal. Mucous membranes are not pale, not dry and not cyanotic. No oropharyngeal exudate, posterior oropharyngeal edema, posterior oropharyngeal erythema or tonsillar abscesses. Eyes: Conjunctivae and EOM are normal. Pupils are equal, round, and reactive to light. Right conjunctiva is not injected. Neck: Normal range of motion. Neck supple. No tracheal deviation present. No thyroid mass present.    Cardiovascular: Normal rate, regular rhythm, S1 normal, S2 normal, normal heart sounds and intact distal pulses. Exam reveals no gallop and no friction rub. No murmur heard. Pulmonary/Chest: Effort normal and breath sounds normal. No respiratory distress. He has no wheezes. He has no rales. He exhibits no tenderness. Musculoskeletal: He exhibits no edema. Lymphadenopathy:     He has no cervical adenopathy. Neurological: He is alert and oriented to person, place, and time. Gait normal.   Skin: Skin is warm and dry. No rash noted. He is not diaphoretic. Psychiatric: Mood and affect normal.   Nursing note and vitals reviewed. Assessment/Plan:     Diagnoses and all orders for this visit:    1. Rhinosinusitis: Day 3 of URI symptoms, likely viral. No respiratory distress. Reassurance provided. Continue conservative symptomatic therapy. RTC if symptoms worsen or do not resolve in 4- 8 days. 2. Sore throat: Conservative symptomatic therapy. -     AMB POC RAPID STREP A: Negative.      Other orders  -     albuterol (PROAIR HFA) 90 mcg/actuation inhaler; INHALE 2 INHALATIONS BY MOUTH EVERY 6 HOURS AS NEEDED FOR WHEEZING OR SHORTNESS OF BREATH          Pt expressed understanding with the diagnosis and plan    Follow-up Disposition: Not on File    Discussed expected course/resolution/complications of diagnosis in detail with patient.    Medication risks/benefits/costs/interactions/alternatives discussed with patient.    Pt was given an after visit summary which includes diagnoses, current medications & vitals.  Pt expressed understanding with the diagnosis and plan

## 2018-12-07 ENCOUNTER — OFFICE VISIT (OUTPATIENT)
Dept: FAMILY MEDICINE CLINIC | Age: 58
End: 2018-12-07

## 2018-12-07 VITALS
RESPIRATION RATE: 18 BRPM | BODY MASS INDEX: 34.59 KG/M2 | OXYGEN SATURATION: 97 % | HEIGHT: 73 IN | TEMPERATURE: 98 F | HEART RATE: 67 BPM | WEIGHT: 261 LBS | DIASTOLIC BLOOD PRESSURE: 66 MMHG | SYSTOLIC BLOOD PRESSURE: 124 MMHG

## 2018-12-07 DIAGNOSIS — J45.40 MODERATE PERSISTENT ASTHMA WITHOUT COMPLICATION: ICD-10-CM

## 2018-12-07 DIAGNOSIS — Z13.0 SCREENING FOR ENDOCRINE, METABOLIC AND IMMUNITY DISORDER: ICD-10-CM

## 2018-12-07 DIAGNOSIS — Z13.228 SCREENING FOR ENDOCRINE, METABOLIC AND IMMUNITY DISORDER: ICD-10-CM

## 2018-12-07 DIAGNOSIS — E78.00 HYPERCHOLESTEROLEMIA: Primary | Chronic | ICD-10-CM

## 2018-12-07 DIAGNOSIS — Z13.29 SCREENING FOR ENDOCRINE, METABOLIC AND IMMUNITY DISORDER: ICD-10-CM

## 2018-12-07 DIAGNOSIS — E66.9 OBESITY (BMI 30-39.9): ICD-10-CM

## 2018-12-07 DIAGNOSIS — E23.6 HYPOTHALAMIC DYSFUNCTION (HCC): Chronic | ICD-10-CM

## 2018-12-07 DIAGNOSIS — R91.1 INCIDENTAL PULMONARY NODULE, > 3MM AND < 8MM: ICD-10-CM

## 2018-12-07 NOTE — PROGRESS NOTES
[address] [facility] Note    Aneudy Mckeon is a 62 y.o. male who was seen in clinic today (12/7/2018). Subjective:  Cardiovascular Review:  The patient has hyperlipidemia and obesity. Diet and Lifestyle: not attempting to follow a low fat, low cholesterol diet, not attempting to follow a low sodium diet, sedentary, nonsmoker  Home BP Monitoring: is not measured at home. Pertinent ROS: no TIA's, no chest pain on exertion, no dyspnea on exertion, no swelling of ankles. Patient taking Crestor without any side effects. Last colonoscopy 1/2018 with Dr. Hayde Harvey. Repeat in 5 years. Patient was evaluated by urology for hematuria with benign findings. Asthma Review:  The patient is being seen for follow up of asthma, not currently in exacerbation. Asthma symptoms occur: weekly, infrequently. Wheezing when present is described as mild-moderate and easily relieved with rescue bronchodilator. Current limitations in activity from asthma: none. Frequency of use of quick-relief meds: monthly. Using [facility] twice daily. Seen by pulmonology, Dr Emanuel Prader. Pulmonary nodules noted on CT chest remain stable. Patient does not smoke cigarettes. Prior to Admission medications    Medication Sig Start Date End Date Taking? Authorizing Provider   coenzyme q10 (CO Q-10) 10 mg cap Take  by mouth. Yes Provider, Historical   rosuvastatin (CRESTOR) 10 mg tablet TK 1 T PO Q NIGHT FOR CHOLESTEROL 9/19/18  Yes Provider, Historical   albuterol (PROAIR HFA) 90 mcg/actuation inhaler INHALE 2 INHALATIONS BY MOUTH EVERY 6 HOURS AS NEEDED FOR WHEEZING OR SHORTNESS OF BREATH 10/19/18  Yes Jaylen Luna NP   naproxen (NAPROSYN) 500 mg tablet Take 1 Tab by mouth two (2) times daily (with meals). Patient taking differently: Take 500 mg by mouth two (2) times daily as needed.  11/4/15  Yes Michael Badillo NP          Allergies   Allergen Reactions    Cortisone (Hydrocortisone) [Hydrocortisone] Herpetiformis Dermatitis     Reaction with eyes    Zocor [Simvastatin] Other (comments)     Bilateral leg cramps           ROS  See HPI    Objective:   Physical Exam   Constitutional: He is oriented to person, place, and time. He appears well-developed and well-nourished. Neck: Normal range of motion. Neck supple. No JVD present. Carotid bruit is not present. No thyromegaly present. Cardiovascular: Normal rate, regular rhythm and intact distal pulses. Exam reveals no gallop and no friction rub. No murmur heard. Pulmonary/Chest: Effort normal and breath sounds normal. No respiratory distress. Musculoskeletal: He exhibits no edema. Lymphadenopathy:     He has no cervical adenopathy. Neurological: He is alert and oriented to person, place, and time. Psychiatric: He has a normal mood and affect. His behavior is normal.   Nursing note and vitals reviewed. Visit Vitals  /66 (BP 1 Location: Left arm, BP Patient Position: Sitting)   Pulse 67   Temp 98 °F (36.7 °C) (Oral)   Resp 18   Ht 6' 1\" (1.854 m)   Wt 261 lb (118.4 kg)   SpO2 97%   BMI 34.43 kg/m²       Assessment & Plan:  Diagnoses and all orders for this visit:    1. Hypercholesterolemia  Recheck labs today. Reviewed diet and lifestyle changes.  -     METABOLIC PANEL, COMPREHENSIVE  -     LIPID PANEL    2. Obesity (BMI 30-39. 9)  Discussed need for weight loss through diet and exercise. Reviewed decreased caloric intake and increased activity. 3. Hypothalamic dysfunction (HCC)  Check thyroid level. Testosterone levels deferred. Referral to endocrinology as needed. -     TSH AND FREE T4    4. Incidental pulmonary nodule, > 3mm and < 8mm  Stable, no changes to current therapy    5. Moderate persistent asthma without complication  Stable, no changes to current therapy    6.  Screening for endocrine, metabolic and immunity disorder  -     CBC W/O DIFF  -     METABOLIC PANEL, COMPREHENSIVE  -     LIPID PANEL  -     TSH AND FREE T4  - PSA W/ REFLX FREE PSA      I have discussed the diagnosis with the patient and the intended plan as seen in the above orders. The patient has received an after-visit summary along with patient information handout. I have discussed medication side effects and warnings with the patient as well. Follow-up Disposition:  Return in about 1 year (around 12/7/2019) for Annual Exam - 30 minutes.         Julianna Mcmanus NP

## 2018-12-07 NOTE — PROGRESS NOTES
Chief Complaint   Patient presents with    Follow-up     fasting labs       1. Have you been to the ER, urgent care clinic since your last visit? Hospitalized since your last visit? No    2. Have you seen or consulted any other health care providers outside of the [address] [place] [address] since your last visit? Include any pap smears or colon screening.  No

## 2018-12-07 NOTE — PATIENT INSTRUCTIONS

## 2018-12-08 LAB
ALBUMIN SERPL-MCNC: 4.3 G/DL (ref 3.5–5.5)
ALBUMIN/GLOB SERPL: 1.6 {RATIO} (ref 1.2–2.2)
ALP SERPL-CCNC: 86 IU/L (ref 39–117)
ALT SERPL-CCNC: 17 IU/L (ref 0–44)
AST SERPL-CCNC: 16 IU/L (ref 0–40)
BILIRUB SERPL-MCNC: 0.3 MG/DL (ref 0–1.2)
BUN SERPL-MCNC: 13 MG/DL (ref 6–24)
BUN/CREAT SERPL: 10 (ref 9–20)
CALCIUM SERPL-MCNC: 9.4 MG/DL (ref 8.7–10.2)
CHLORIDE SERPL-SCNC: 102 MMOL/L (ref 96–106)
CHOLEST SERPL-MCNC: 132 MG/DL (ref 100–199)
CO2 SERPL-SCNC: 27 MMOL/L (ref 20–29)
CREAT SERPL-MCNC: 1.28 MG/DL (ref 0.76–1.27)
ERYTHROCYTE [DISTWIDTH] IN BLOOD BY AUTOMATED COUNT: 13.7 % (ref 12.3–15.4)
GLOBULIN SER CALC-MCNC: 2.7 G/DL (ref 1.5–4.5)
GLUCOSE SERPL-MCNC: 92 MG/DL (ref 65–99)
HCT VFR BLD AUTO: 42.3 % (ref 37.5–51)
HDLC SERPL-MCNC: 45 MG/DL
HGB BLD-MCNC: 13.9 G/DL (ref 13–17.7)
INTERPRETATION, 910389: NORMAL
LDLC SERPL CALC-MCNC: 67 MG/DL (ref 0–99)
MCH RBC QN AUTO: 31.4 PG (ref 26.6–33)
MCHC RBC AUTO-ENTMCNC: 32.9 G/DL (ref 31.5–35.7)
MCV RBC AUTO: 96 FL (ref 79–97)
PLATELET # BLD AUTO: 255 X10E3/UL (ref 150–379)
POTASSIUM SERPL-SCNC: 5.1 MMOL/L (ref 3.5–5.2)
PROT SERPL-MCNC: 7 G/DL (ref 6–8.5)
PSA SERPL-MCNC: 0.8 NG/ML (ref 0–4)
RBC # BLD AUTO: 4.43 X10E6/UL (ref 4.14–5.8)
REFLEX CRITERIA: NORMAL
SODIUM SERPL-SCNC: 142 MMOL/L (ref 134–144)
T4 FREE SERPL-MCNC: 1.16 NG/DL (ref 0.82–1.77)
TRIGL SERPL-MCNC: 102 MG/DL (ref 0–149)
TSH SERPL DL<=0.005 MIU/L-ACNC: 1.99 UIU/ML (ref 0.45–4.5)
VLDLC SERPL CALC-MCNC: 20 MG/DL (ref 5–40)
WBC # BLD AUTO: 7.5 X10E3/UL (ref 3.4–10.8)

## 2019-10-01 DIAGNOSIS — E78.00 HYPERCHOLESTEROLEMIA: Chronic | ICD-10-CM

## 2019-10-02 RX ORDER — ROSUVASTATIN CALCIUM 10 MG/1
TABLET, COATED ORAL
Qty: 90 TAB | Refills: 0 | Status: SHIPPED | OUTPATIENT
Start: 2019-10-02 | End: 2020-01-28

## 2019-10-13 ENCOUNTER — HOSPITAL ENCOUNTER (OUTPATIENT)
Age: 59
Setting detail: OBSERVATION
Discharge: HOME | End: 2019-10-14
Attending: EMERGENCY MEDICINE | Admitting: HOSPITALIST
Payer: COMMERCIAL

## 2019-10-13 ENCOUNTER — APPOINTMENT (OUTPATIENT)
Dept: CT IMAGING | Age: 59
End: 2019-10-13
Attending: EMERGENCY MEDICINE
Payer: COMMERCIAL

## 2019-10-13 ENCOUNTER — APPOINTMENT (OUTPATIENT)
Dept: CT IMAGING | Age: 59
End: 2019-10-13
Attending: HOSPITALIST
Payer: COMMERCIAL

## 2019-10-13 ENCOUNTER — APPOINTMENT (OUTPATIENT)
Dept: GENERAL RADIOLOGY | Age: 59
End: 2019-10-13
Attending: EMERGENCY MEDICINE
Payer: COMMERCIAL

## 2019-10-13 ENCOUNTER — APPOINTMENT (OUTPATIENT)
Dept: MRI IMAGING | Age: 59
End: 2019-10-13
Attending: HOSPITALIST
Payer: COMMERCIAL

## 2019-10-13 DIAGNOSIS — R42 DIZZINESS: ICD-10-CM

## 2019-10-13 DIAGNOSIS — R42 VERTIGO: ICD-10-CM

## 2019-10-13 PROBLEM — G45.9 TIA (TRANSIENT ISCHEMIC ATTACK): Status: ACTIVE | Noted: 2019-10-13

## 2019-10-13 LAB
ALBUMIN SERPL-MCNC: 3.4 G/DL (ref 3.5–5)
ALBUMIN/GLOB SERPL: 0.8 {RATIO} (ref 1.1–2.2)
ALP SERPL-CCNC: 83 U/L (ref 45–117)
ALT SERPL-CCNC: 38 U/L (ref 12–78)
ANION GAP SERPL CALC-SCNC: 7 MMOL/L (ref 5–15)
APTT PPP: 26 SEC (ref 22.1–32)
AST SERPL-CCNC: 22 U/L (ref 15–37)
ATRIAL RATE: 71 BPM
BASOPHILS # BLD: 0 K/UL (ref 0–0.1)
BASOPHILS NFR BLD: 0 % (ref 0–1)
BILIRUB SERPL-MCNC: 0.3 MG/DL (ref 0.2–1)
BUN SERPL-MCNC: 15 MG/DL (ref 6–20)
BUN/CREAT SERPL: 13 (ref 12–20)
CALCIUM SERPL-MCNC: 9.1 MG/DL (ref 8.5–10.1)
CALCULATED P AXIS, ECG09: 28 DEGREES
CALCULATED R AXIS, ECG10: -21 DEGREES
CALCULATED T AXIS, ECG11: 17 DEGREES
CHLORIDE SERPL-SCNC: 104 MMOL/L (ref 97–108)
CO2 SERPL-SCNC: 28 MMOL/L (ref 21–32)
CREAT SERPL-MCNC: 1.19 MG/DL (ref 0.7–1.3)
DIAGNOSIS, 93000: NORMAL
DIFFERENTIAL METHOD BLD: NORMAL
EOSINOPHIL # BLD: 0.3 K/UL (ref 0–0.4)
EOSINOPHIL NFR BLD: 4 % (ref 0–7)
ERYTHROCYTE [DISTWIDTH] IN BLOOD BY AUTOMATED COUNT: 12.7 % (ref 11.5–14.5)
GLOBULIN SER CALC-MCNC: 4.4 G/DL (ref 2–4)
GLUCOSE BLD STRIP.AUTO-MCNC: 116 MG/DL (ref 65–100)
GLUCOSE SERPL-MCNC: 101 MG/DL (ref 65–100)
HCT VFR BLD AUTO: 41.3 % (ref 36.6–50.3)
HGB BLD-MCNC: 13.4 G/DL (ref 12.1–17)
IMM GRANULOCYTES # BLD AUTO: 0 K/UL (ref 0–0.04)
IMM GRANULOCYTES NFR BLD AUTO: 0 % (ref 0–0.5)
INR PPP: 1 (ref 0.9–1.1)
LYMPHOCYTES # BLD: 1.2 K/UL (ref 0.8–3.5)
LYMPHOCYTES NFR BLD: 17 % (ref 12–49)
MCH RBC QN AUTO: 31.8 PG (ref 26–34)
MCHC RBC AUTO-ENTMCNC: 32.4 G/DL (ref 30–36.5)
MCV RBC AUTO: 97.9 FL (ref 80–99)
MONOCYTES # BLD: 0.3 K/UL (ref 0–1)
MONOCYTES NFR BLD: 5 % (ref 5–13)
NEUTS SEG # BLD: 5 K/UL (ref 1.8–8)
NEUTS SEG NFR BLD: 74 % (ref 32–75)
NRBC # BLD: 0 K/UL (ref 0–0.01)
NRBC BLD-RTO: 0 PER 100 WBC
P-R INTERVAL, ECG05: 180 MS
PLATELET # BLD AUTO: 236 K/UL (ref 150–400)
PMV BLD AUTO: 9.9 FL (ref 8.9–12.9)
POTASSIUM SERPL-SCNC: 4 MMOL/L (ref 3.5–5.1)
PROT SERPL-MCNC: 7.8 G/DL (ref 6.4–8.2)
PROTHROMBIN TIME: 10.2 SEC (ref 9–11.1)
Q-T INTERVAL, ECG07: 372 MS
QRS DURATION, ECG06: 86 MS
QTC CALCULATION (BEZET), ECG08: 404 MS
RBC # BLD AUTO: 4.22 M/UL (ref 4.1–5.7)
SERVICE CMNT-IMP: ABNORMAL
SODIUM SERPL-SCNC: 139 MMOL/L (ref 136–145)
THERAPEUTIC RANGE,PTTT: NORMAL SECS (ref 58–77)
TROPONIN I SERPL-MCNC: <0.05 NG/ML
VENTRICULAR RATE, ECG03: 71 BPM
WBC # BLD AUTO: 6.9 K/UL (ref 4.1–11.1)

## 2019-10-13 PROCEDURE — 99218 HC RM OBSERVATION: CPT

## 2019-10-13 PROCEDURE — 82962 GLUCOSE BLOOD TEST: CPT

## 2019-10-13 PROCEDURE — 84484 ASSAY OF TROPONIN QUANT: CPT

## 2019-10-13 PROCEDURE — 80053 COMPREHEN METABOLIC PANEL: CPT

## 2019-10-13 PROCEDURE — 85610 PROTHROMBIN TIME: CPT

## 2019-10-13 PROCEDURE — 83036 HEMOGLOBIN GLYCOSYLATED A1C: CPT

## 2019-10-13 PROCEDURE — 85730 THROMBOPLASTIN TIME PARTIAL: CPT

## 2019-10-13 PROCEDURE — 71046 X-RAY EXAM CHEST 2 VIEWS: CPT

## 2019-10-13 PROCEDURE — 36415 COLL VENOUS BLD VENIPUNCTURE: CPT

## 2019-10-13 PROCEDURE — 74011250637 HC RX REV CODE- 250/637: Performed by: HOSPITALIST

## 2019-10-13 PROCEDURE — 70498 CT ANGIOGRAPHY NECK: CPT

## 2019-10-13 PROCEDURE — 80061 LIPID PANEL: CPT

## 2019-10-13 PROCEDURE — 85025 COMPLETE CBC W/AUTO DIFF WBC: CPT

## 2019-10-13 PROCEDURE — 70450 CT HEAD/BRAIN W/O DYE: CPT

## 2019-10-13 PROCEDURE — 70496 CT ANGIOGRAPHY HEAD: CPT

## 2019-10-13 PROCEDURE — 70551 MRI BRAIN STEM W/O DYE: CPT

## 2019-10-13 PROCEDURE — 74011250636 HC RX REV CODE- 250/636: Performed by: HOSPITALIST

## 2019-10-13 PROCEDURE — 74011000258 HC RX REV CODE- 258: Performed by: RADIOLOGY

## 2019-10-13 PROCEDURE — 99283 EMERGENCY DEPT VISIT LOW MDM: CPT

## 2019-10-13 PROCEDURE — 74011636320 HC RX REV CODE- 636/320: Performed by: RADIOLOGY

## 2019-10-13 PROCEDURE — 93005 ELECTROCARDIOGRAM TRACING: CPT

## 2019-10-13 RX ORDER — ACETAMINOPHEN 650 MG/1
650 SUPPOSITORY RECTAL
Status: DISCONTINUED | OUTPATIENT
Start: 2019-10-13 | End: 2019-10-14 | Stop reason: HOSPADM

## 2019-10-13 RX ORDER — ALBUTEROL SULFATE 0.83 MG/ML
2.5 SOLUTION RESPIRATORY (INHALATION)
Status: DISCONTINUED | OUTPATIENT
Start: 2019-10-13 | End: 2019-10-14 | Stop reason: HOSPADM

## 2019-10-13 RX ORDER — SODIUM CHLORIDE 9 MG/ML
75 INJECTION, SOLUTION INTRAVENOUS CONTINUOUS
Status: DISPENSED | OUTPATIENT
Start: 2019-10-13 | End: 2019-10-14

## 2019-10-13 RX ORDER — SODIUM CHLORIDE 0.9 % (FLUSH) 0.9 %
5-40 SYRINGE (ML) INJECTION AS NEEDED
Status: DISCONTINUED | OUTPATIENT
Start: 2019-10-13 | End: 2019-10-13

## 2019-10-13 RX ORDER — ACETAMINOPHEN 500 MG
500 TABLET ORAL
COMMUNITY
End: 2021-11-26

## 2019-10-13 RX ORDER — SODIUM CHLORIDE 0.9 % (FLUSH) 0.9 %
10 SYRINGE (ML) INJECTION
Status: COMPLETED | OUTPATIENT
Start: 2019-10-13 | End: 2019-10-13

## 2019-10-13 RX ORDER — ROSUVASTATIN CALCIUM 10 MG/1
10 TABLET, COATED ORAL
Status: DISCONTINUED | OUTPATIENT
Start: 2019-10-13 | End: 2019-10-14 | Stop reason: HOSPADM

## 2019-10-13 RX ORDER — ACETAMINOPHEN 325 MG/1
650 TABLET ORAL
Status: DISCONTINUED | OUTPATIENT
Start: 2019-10-13 | End: 2019-10-14 | Stop reason: HOSPADM

## 2019-10-13 RX ORDER — SODIUM CHLORIDE 9 MG/ML
50 INJECTION, SOLUTION INTRAVENOUS ONCE
Status: DISCONTINUED | OUTPATIENT
Start: 2019-10-13 | End: 2019-10-13

## 2019-10-13 RX ORDER — SODIUM CHLORIDE 0.9 % (FLUSH) 0.9 %
5-40 SYRINGE (ML) INJECTION EVERY 8 HOURS
Status: DISCONTINUED | OUTPATIENT
Start: 2019-10-13 | End: 2019-10-13

## 2019-10-13 RX ORDER — LABETALOL HYDROCHLORIDE 5 MG/ML
10 INJECTION, SOLUTION INTRAVENOUS
Status: DISCONTINUED | OUTPATIENT
Start: 2019-10-13 | End: 2019-10-13

## 2019-10-13 RX ORDER — BISMUTH SUBSALICYLATE 262 MG
1 TABLET,CHEWABLE ORAL DAILY
COMMUNITY
End: 2021-11-26

## 2019-10-13 RX ORDER — GUAIFENESIN 100 MG/5ML
81 LIQUID (ML) ORAL DAILY
Status: DISCONTINUED | OUTPATIENT
Start: 2019-10-14 | End: 2019-10-14 | Stop reason: HOSPADM

## 2019-10-13 RX ADMIN — ROSUVASTATIN CALCIUM 10 MG: 10 TABLET, COATED ORAL at 21:05

## 2019-10-13 RX ADMIN — SODIUM CHLORIDE 100 ML: 900 INJECTION, SOLUTION INTRAVENOUS at 15:50

## 2019-10-13 RX ADMIN — IOPAMIDOL 100 ML: 755 INJECTION, SOLUTION INTRAVENOUS at 15:50

## 2019-10-13 RX ADMIN — SODIUM CHLORIDE 75 ML/HR: 900 INJECTION, SOLUTION INTRAVENOUS at 14:47

## 2019-10-13 RX ADMIN — Medication 10 ML: at 15:50

## 2019-10-13 NOTE — H&P
History & Physical    Primary Care Provider: Skye Bragg NP  Source of Information: Patient     History of Presenting Illness:   Heydi Hodges is a 61 y.o. male who presents with dizziness and unsteady gait     Mr. Hernán Garcia is a 75-year-old male who presents to the ER with complaints of feeling dizzy. He said he was sitting down at 10:30 AM when he suddenly felt dizzy. He did not try to get up and was not moving when this happened. He did try to walk shortly afterwards and he felt too unsteady to walk well. Said he kept having to grab onto the wall and was falling to the side. He denies any similar symptoms to this previously. He had a history of positional vertigo. However, he had a sensation of the room spinning at that time. He does not have that at all at this time. He denies any other numbness or weakness to his arms or legs. He does report having had a diarrheal illness where he had about 3-4 episodes of diarrhea each day for the last 3 to 4 days. He had some abdominal pain couple days ago he does not have abdominal pain now. He denies any recent surgeries. He has not had any recent blood in stool or blood in his urine or any other blood that is noted. He denies other complaints. He feels better now. Review of Systems:  General: no fever, low appetite last week, no changes of sleep  HEENT: no headache, no vision changes, no nose discharge, no hearing changes   RES: no wheezing, no cough, no sob  CVS: no cp, no palpitation.   Muscular: no joint swelling, no muscle pain, no leg swelling  Skin: no rash, no itching   GI: no vomiting,HPI   : no dysuria, no hematuria  Hemo: no gum bleeding, no petechial   Neuro:hpi   Endo: no polydipsia   Psych: denied depression     Past Medical History:   Diagnosis Date    Anal fissure     Arthritis     Asthma     Benign positional vertigo     Hematuria of undiagnosed cause 01/2018    Hypercholesterolemia     Hypothalamic syndrome ([address] 1969    s/t TBI, treated with growth hormone    Pulmonary nodule seen on imaging study 7/12/16    4 mm bilaterally, repeat CT in 1 year      Past Surgical History:   Procedure Laterality Date    HX COLONOSCOPY  01/2018    HX ORTHOPAEDIC      bilateral hips    HX UROLOGICAL  2018    cystoscopy with benign findings     Prior to Admission medications    Medication Sig Start Date End Date Taking? Authorizing Provider   rosuvastatin (CRESTOR) 10 mg tablet TAKE 1 TABLET BY MOUTH EVERY NIGHT FOR CHOLESTEROL 10/2/19   Kumar Phillips NP   coenzyme q10 (CO Q-10) 10 mg cap Take  by mouth. Provider, Historical   albuterol (PROAIR HFA) 90 mcg/actuation inhaler INHALE 2 INHALATIONS BY MOUTH EVERY 6 HOURS AS NEEDED FOR WHEEZING OR SHORTNESS OF BREATH 10/19/18   Deloris Copeland NP   naproxen (NAPROSYN) 500 mg tablet Take 1 Tab by mouth two (2) times daily (with meals). Patient taking differently: Take 500 mg by mouth two (2) times daily as needed. 11/4/15   Lori Lanza NP     Allergies   Allergen Reactions    Cortisone (Hydrocortisone) [Hydrocortisone] Herpetiformis Dermatitis     Reaction with eyes    Zocor [Simvastatin] Other (comments)     Bilateral leg cramps      History reviewed. No pertinent family history. SOCIAL HISTORY:  Patient resides:  Independently x   Assisted Living    SNF    With family care       Smoking history:   None x   Former    Chronic      Alcohol history:   None x   Social    Chronic      Ambulates:   Independently x   w/cane    w/walker    w/wc    CODE STATUS:  DNR    Full x   Other      Objective:     Physical Exam:     Visit Vitals  /69 (BP 1 Location: Left arm, BP Patient Position: At rest)   Temp 98.1 °F (36.7 °C)   Resp 17   Ht 6' 1\" (1.854 m)   Wt 121.9 kg (268 lb 11.9 oz)   SpO2 99%   BMI 35.46 kg/m²      O2 Device: Room air    General:  Alert, cooperative, no distress, appears stated age.    Head:  Normocephalic, without obvious abnormality, atraumatic. Eyes:  Conjunctivae/corneas clear. PERRL, EOMs intact. No nystagmus. Nose: Nares normal. Septum midline. Mucosa normal. No drainage or sinus tenderness. Throat: Lips, mucosa, and tongue normal. Teeth and gums normal.   Neck: Supple, symmetrical, trachea midline, no adenopathy, thyroid: no enlargement/tenderness/nodules, no carotid bruit and no JVD. Back:   Symmetric, no curvature. ROM normal. No CVA tenderness. Lungs:   Clear to auscultation bilaterally. Chest wall:  No tenderness or deformity. Heart:  Regular rate and rhythm, S1, S2 normal, no murmur, click, rub or gallop. Abdomen:   Soft, non-tender. Bowel sounds normal. No masses,  No organomegaly. Extremities: Extremities normal, atraumatic, no cyanosis or edema. Pulses: 2+ and symmetric all extremities. Skin: Skin color, texture, turgor normal. No rashes or lesions   Neurologic: CNII-XII intact. 5/5, no sensation changes            Data Review:     Recent Days:  Recent Labs     10/13/19  1311   WBC 6.9   HGB 13.4   HCT 41.3        Recent Labs     10/13/19  1311      K 4.0      CO2 28   *   BUN 15   CREA 1.19   CA 9.1   ALB 3.4*   SGOT 22   ALT 38   INR 1.0     No results for input(s): PH, PCO2, PO2, HCO3, FIO2 in the last 72 hours.     24 Hour Results:  Recent Results (from the past 24 hour(s))   GLUCOSE, POC    Collection Time: 10/13/19 12:51 PM   Result Value Ref Range    Glucose (POC) 116 (H) 65 - 100 mg/dL    Performed by Yesenia Dougherty (PCT)    CBC WITH AUTOMATED DIFF    Collection Time: 10/13/19  1:11 PM   Result Value Ref Range    WBC 6.9 4.1 - 11.1 K/uL    RBC 4.22 4.10 - 5.70 M/uL    HGB 13.4 12.1 - 17.0 g/dL    HCT 41.3 36.6 - 50.3 %    MCV 97.9 80.0 - 99.0 FL    MCH 31.8 26.0 - 34.0 PG    MCHC 32.4 30.0 - 36.5 g/dL    RDW 12.7 11.5 - 14.5 %    PLATELET 887 904 - 853 K/uL    MPV 9.9 8.9 - 12.9 FL    NRBC 0.0 0  WBC    ABSOLUTE NRBC 0.00 0.00 - 0.01 K/uL    NEUTROPHILS 74 32 - 75 %    LYMPHOCYTES 17 12 - 49 %    MONOCYTES 5 5 - 13 %    EOSINOPHILS 4 0 - 7 %    BASOPHILS 0 0 - 1 %    IMMATURE GRANULOCYTES 0 0.0 - 0.5 %    ABS. NEUTROPHILS 5.0 1.8 - 8.0 K/UL    ABS. LYMPHOCYTES 1.2 0.8 - 3.5 K/UL    ABS. MONOCYTES 0.3 0.0 - 1.0 K/UL    ABS. EOSINOPHILS 0.3 0.0 - 0.4 K/UL    ABS. BASOPHILS 0.0 0.0 - 0.1 K/UL    ABS. IMM. GRANS. 0.0 0.00 - 0.04 K/UL    DF AUTOMATED     METABOLIC PANEL, COMPREHENSIVE    Collection Time: 10/13/19  1:11 PM   Result Value Ref Range    Sodium 139 136 - 145 mmol/L    Potassium 4.0 3.5 - 5.1 mmol/L    Chloride 104 97 - 108 mmol/L    CO2 28 21 - 32 mmol/L    Anion gap 7 5 - 15 mmol/L    Glucose 101 (H) 65 - 100 mg/dL    BUN 15 6 - 20 MG/DL    Creatinine 1.19 0.70 - 1.30 MG/DL    BUN/Creatinine ratio 13 12 - 20      GFR est AA >60 >60 ml/min/1.73m2    GFR est non-AA >60 >60 ml/min/1.73m2    Calcium 9.1 8.5 - 10.1 MG/DL    Bilirubin, total 0.3 0.2 - 1.0 MG/DL    ALT (SGPT) 38 12 - 78 U/L    AST (SGOT) 22 15 - 37 U/L    Alk.  phosphatase 83 45 - 117 U/L    Protein, total 7.8 6.4 - 8.2 g/dL    Albumin 3.4 (L) 3.5 - 5.0 g/dL    Globulin 4.4 (H) 2.0 - 4.0 g/dL    A-G Ratio 0.8 (L) 1.1 - 2.2     TROPONIN I    Collection Time: 10/13/19  1:11 PM   Result Value Ref Range    Troponin-I, Qt. <0.05 <0.05 ng/mL   PTT    Collection Time: 10/13/19  1:11 PM   Result Value Ref Range    aPTT 26.0 22.1 - 32.0 sec    aPTT, therapeutic range     58.0 - 77.0 SECS   PROTHROMBIN TIME + INR    Collection Time: 10/13/19  1:11 PM   Result Value Ref Range    INR 1.0 0.9 - 1.1      Prothrombin time 10.2 9.0 - 11.1 sec   EKG, 12 LEAD, INITIAL    Collection Time: 10/13/19  1:33 PM   Result Value Ref Range    Ventricular Rate 71 BPM    Atrial Rate 71 BPM    P-R Interval 180 ms    QRS Duration 86 ms    Q-T Interval 372 ms    QTC Calculation (Bezet) 404 ms    Calculated P Axis 28 degrees    Calculated R Axis -21 degrees    Calculated T Axis 17 degrees    Diagnosis       Normal sinus rhythm  Nonspecific T wave abnormality  When compared with ECG of 12-JUL-2016 04:46,  No significant change was found           Imaging:   Xr Chest Pa Lat    Result Date: 10/13/2019  IMPRESSION: No acute abnormality identified     Ct Code Neuro Head Wo Contrast    Result Date: 10/13/2019   IMPRESSION: No acute intracranial process is identified. There is question of a 4 mm aneurysm left middle cerebral artery trifurcation. No prior studies are available for comparison. Results called to the ER. Assessment:     Active Problems:    TIA (transient ischemic attack) (10/13/2019)           Plan:     1. Dizzy and unsteady gait: symptoms much improved now. Should consider TIA/CVA. obs in neuro floor. CTA of head and neck pending. Order brain MRI. Echo. Other stroke work up including a1c, flp. Pt/ot. Sp/ neuro consult   2.  Possible 4mm brain aneurysm: will follow the result of CTA of head and neck        Signed By: Carmine Nolen MD     October 13, 2019

## 2019-10-13 NOTE — PROGRESS NOTES
Admission Medication Reconciliation:    Information obtained from:  Patient and his wife. RxQuery data available¹:  YES    Comments/Recommendations: Updated PTA meds/reviewed patient's allergies. 1)  Spoke with patient and his wife to update PTA medication list.     2)  Medication changes (since last review): Added  - Pepto Bismol (unable to verify dose, but patient has been taking multiple times in the past few days)  -Tylenol and multivitamin  -\"Prime secretagogue supplement \"    Adjusted  - none    Removed  - naproxen    3) Last home doses are noted below     [facility] benefit data reflects medications filled and processed through the patient's insurance, however   this data does NOT capture whether the medication was picked up or is currently being taken by the patient. Allergies:  Cortisone (hydrocortisone) [hydrocortisone] and Zocor [simvastatin]    Significant PMH/Disease States:   Past Medical History:   Diagnosis Date    Anal fissure     Arthritis     Asthma     Benign positional vertigo     Hematuria of undiagnosed cause 01/2018    Hypercholesterolemia     Hypothalamic syndrome ([facility] [address] 1969    s/t TBI, treated with growth hormone    Pulmonary nodule seen on imaging study 7/12/16    4 mm bilaterally, repeat CT in 1 year     Chief Complaint for this Admission:    Chief Complaint   Patient presents with    Dizziness     Prior to Admission Medications:   Prior to Admission Medications   Prescriptions Last Dose Informant Patient Reported? Taking? OTHER   Yes Yes   Sig: Take  by mouth six (6) days a week. prime secretagogue supplement   acetaminophen (TYLENOL EXTRA STRENGTH) 500 mg tablet   Yes Yes   Sig: Take 500 mg by mouth every six (6) hours as needed for Pain.    albuterol (PROAIR HFA) 90 mcg/actuation inhaler   No Yes   Sig: INHALE 2 INHALATIONS BY MOUTH EVERY 6 HOURS AS NEEDED FOR WHEEZING OR SHORTNESS OF BREATH   bismuth subsalicylate (PEPTO-BISMOL PO) 10/13/2019 at Unknown time  Yes Yes Sig: Take  by mouth as needed. multivitamin (ONE A DAY) tablet   Yes Yes   Sig: Take 1 Tab by mouth daily. rosuvastatin (CRESTOR) 10 mg tablet 10/12/2019 at Unknown time  No Yes   Sig: TAKE 1 TABLET BY MOUTH EVERY NIGHT FOR CHOLESTEROL   ubidecarenone (CO Q-10 PO) 10/12/2019 at Unknown time  Yes Yes   Sig: Take 1 Cap by mouth daily. Facility-Administered Medications: None       Please contact the main inpatient pharmacy with any questions or concerns at [phone] and we will direct you to the clinical pharmacist covering this patient's care while in-house.    Maribel Lowry, PHARMD

## 2019-10-13 NOTE — PROGRESS NOTES
Problem: TIA/CVA Stroke: 0-24 hours  Goal: Activity/Safety  Outcome: Progressing Towards Goal  Goal: Consults, if ordered  Outcome: Progressing Towards Goal  Goal: Diagnostic Test/Procedures  Outcome: Progressing Towards Goal  Goal: Nutrition/Diet  Outcome: Progressing Towards Goal  Goal: Medications  Outcome: Progressing Towards Goal  Goal: Respiratory  Outcome: Progressing Towards Goal  Goal: Treatments/Interventions/Procedures  Outcome: Progressing Towards Goal     Problem: Falls - Risk of  Goal: *Absence of Falls  Description  Document Ubaldo Ortiz Fall Risk and appropriate interventions in the flowsheet.   Outcome: Progressing Towards Goal  Note:   Fall Risk Interventions:                                Problem: Pain  Goal: *Control of Pain  Outcome: Progressing Towards Goal

## 2019-10-13 NOTE — ED TRIAGE NOTES
Pt reports dizziness and feeling off balance since 1030 this morning. Pt also reports having diarrhea and flu like symptoms for the pat few days. Blood sugar 116 in triage.

## 2019-10-13 NOTE — ED NOTES
1257 Patient arrived to CT via wheelchair. Patient reported when he lays flat, he feels dizzy. 1300 CT completed, escorting to room 4.    1302 Arrived to room 4.     1304 Dr Ace Cobb tele neurologist assessing patient. Per MD discontinue TPA. NIH score 0, and if  MRI negative can go home if he able to walk around. Dr Jyotsna Little at bedside.      1328 Arrived to room 25

## 2019-10-13 NOTE — ED PROVIDER NOTES
Mr. George Cox is a 49-year-old male who presents to the ER with complaints of feeling dizzy. He said he was sitting down at 10:30 AM when he suddenly felt dizzy. He did not try to get up and was not moving when this happened. He did try to walk shortly afterwards and he felt too unsteady to walk well. Said he kept having to grab onto the wall and was falling to the side. He denies any similar symptoms to this previously. Is have a history of positional vertigo. However, he had a sensation of the room spinning at that time. He does not have that at all at this time. He denies any other numbness or weakness to his arms or legs. He does report having had a diarrheal illness where he had about 3-4 episodes of diarrhea each day for the last 3 to 4 days. He had some abdominal pain couple days ago he does not have abdominal pain now. He denies any recent surgeries. He has not had any recent blood in stool or blood in his urine or any other blood that is noted. He denies other complaints. Past Medical History:   Diagnosis Date    Anal fissure     Arthritis     Asthma     Benign positional vertigo     Hematuria of undiagnosed cause 01/2018    Hypercholesterolemia     Hypothalamic syndrome ([facility] [address] 1969    s/t TBI, treated with growth hormone    Pulmonary nodule seen on imaging study 7/12/16    4 mm bilaterally, repeat CT in 1 year       Past Surgical History:   Procedure Laterality Date    HX COLONOSCOPY  01/2018    HX ORTHOPAEDIC      bilateral hips    HX UROLOGICAL  2018    cystoscopy with benign findings         History reviewed. No pertinent family history.     Social History     Socioeconomic History    Marital status:      Spouse name: Not on file    Number of children: Not on file    Years of education: Not on file    Highest education level: Not on file   Occupational History    Not on file   Social Needs    Financial resource strain: Not on file    Food insecurity: Worry: Not on file     Inability: Not on file    Transportation needs:     Medical: Not on file     Non-medical: Not on file   Tobacco Use    Smoking status: Never Smoker    Smokeless tobacco: Never Used   Substance and Sexual Activity    Alcohol use: Yes     Alcohol/week: 0.8 standard drinks     Types: 1 Cans of beer per week    Drug use: No    Sexual activity: Yes     Partners: Female   Lifestyle    Physical activity:     Days per week: Not on file     Minutes per session: Not on file    Stress: Not on file   Relationships    Social connections:     Talks on phone: Not on file     Gets together: Not on file     Attends Yarsani service: Not on file     Active member of club or organization: Not on file     Attends meetings of clubs or organizations: Not on file     Relationship status: Not on file    Intimate partner violence:     Fear of current or ex partner: Not on file     Emotionally abused: Not on file     Physically abused: Not on file     Forced sexual activity: Not on file   Other Topics Concern    Not on file   Social History Narrative    Not on file         ALLERGIES: Cortisone (hydrocortisone) [hydrocortisone] and Zocor [simvastatin]    Review of Systems   Constitutional: Negative for chills and fever. HENT: Negative for rhinorrhea and sore throat. Respiratory: Negative for cough and shortness of breath. Cardiovascular: Negative for chest pain. Gastrointestinal: Negative for abdominal pain, diarrhea, nausea and vomiting. Genitourinary: Negative for dysuria and hematuria. Musculoskeletal: Negative for arthralgias and myalgias. Skin: Negative for pallor and rash. Neurological: Positive for dizziness. Negative for weakness and light-headedness. Difficulty walking   All other systems reviewed and are negative. Vitals:    10/13/19 1305   Weight: 121.9 kg (268 lb 11.9 oz)            Physical Exam     Vital signs reviewed. Nursing notes reviewed.     Const:  No acute distress, well developed, well nourished  Head:  Atraumatic, normocephalic  Eyes:  PERRL, conjunctiva normal, no scleral icterus  Neck:  Supple, trachea midline  Cardiovascular:  RRR, no murmurs, no gallops, no rubs  Resp:  No resp distress, no increased work of breathing, no wheezes, no rhonchi, no rales,  Abd:  Soft, non-tender, non-distended, no rebound, no guarding, no CVA tenderness  MSK:  No pedal edema, normal ROM  Neuro:  Alert and oriented x3, no cranial nerve defect, unsteady gait, equal strength in upper and lower extremities  Skin:  Warm, dry, intact  Psych: normal mood and affect, behavior is normal, judgement and thought content is normal          MDM  Number of Diagnoses or Management Options     Amount and/or Complexity of Data Reviewed  Clinical lab tests: ordered and reviewed  Tests in the radiology section of CPT®: ordered and reviewed  Review and summarize past medical records: yes    Patient Progress  Patient progress: stable          Mr. Jovany Murillo is a 55-year-old male who presents to the ER with difficulty walking. Has a history of positional vertigo. This could be was going on. However, the symptoms are different than any previous that he has had. No mass or bleed on head CT. Concern for possible posterior circulation stroke.   Patient to be evaluated for admission by the hospitalist.      Procedures

## 2019-10-13 NOTE — ROUTINE PROCESS
TRANSFER - OUT REPORT:    Verbal report given to EDWIN Garnett(name) on Traci Freeman  being transferred to [facility](unit) for routine progression of care       Report consisted of patients Situation, Background, Assessment and   Recommendations(SBAR). Information from the following report(s) SBAR was reviewed with the receiving nurse. Lines:   Peripheral IV 10/13/19 Left Antecubital (Active)   Site Assessment Clean, dry, & intact 10/13/2019  1:10 PM   Phlebitis Assessment 0 10/13/2019  1:10 PM   Infiltration Assessment 0 10/13/2019  1:10 PM   Dressing Status Clean, dry, & intact 10/13/2019  1:10 PM   Dressing Type Topical skin adhesive;Transparent 10/13/2019  1:10 PM   Hub Color/Line Status Pink;Capped;Flushed;Patent 10/13/2019  1:10 PM   Action Taken Blood drawn 10/13/2019  1:10 PM       Peripheral IV 10/13/19 Right Antecubital (Active)   Site Assessment Clean 10/13/2019  1:05 PM   Phlebitis Assessment 0 10/13/2019  1:05 PM   Infiltration Assessment 0 10/13/2019  1:05 PM   Dressing Status Clean, dry, & intact 10/13/2019  1:05 PM   Dressing Type Transparent 10/13/2019  1:05 PM   Hub Color/Line Status Pink 10/13/2019  1:05 PM   Action Taken Blood drawn 10/13/2019  1:05 PM        Opportunity for questions and clarification was provided.

## 2019-10-14 ENCOUNTER — APPOINTMENT (OUTPATIENT)
Dept: NON INVASIVE DIAGNOSTICS | Age: 59
End: 2019-10-14
Attending: HOSPITALIST
Payer: COMMERCIAL

## 2019-10-14 VITALS
TEMPERATURE: 98.1 F | BODY MASS INDEX: 35.94 KG/M2 | HEART RATE: 76 BPM | OXYGEN SATURATION: 98 % | SYSTOLIC BLOOD PRESSURE: 116 MMHG | DIASTOLIC BLOOD PRESSURE: 56 MMHG | WEIGHT: 271.17 LBS | RESPIRATION RATE: 20 BRPM | HEIGHT: 73 IN

## 2019-10-14 LAB
CHOLEST SERPL-MCNC: 178 MG/DL
ECHO AO ROOT DIAM: 3.71 CM
ECHO AV CUSP MM: 2.67 CM
ECHO AV PEAK GRADIENT: 6.1 MMHG
ECHO AV PEAK VELOCITY: 123.87 CM/S
ECHO LA MAJOR AXIS: 3.65 CM
ECHO LA TO AORTIC ROOT RATIO: 0.98
ECHO LV E' LATERAL VELOCITY: 9.16 CM/S
ECHO LV E' SEPTAL VELOCITY: 6.91 CM/S
ECHO LV INTERNAL DIMENSION DIASTOLIC: 3.99 CM (ref 4.2–5.9)
ECHO LV INTERNAL DIMENSION SYSTOLIC: 2.82 CM
ECHO LV IVSD: 0.85 CM (ref 0.6–1)
ECHO LV MASS 2D: 116.8 G (ref 88–224)
ECHO LV MASS INDEX 2D: 47.7 G/M2 (ref 49–115)
ECHO LV POSTERIOR WALL DIASTOLIC: 0.9 CM (ref 0.6–1)
ECHO LVOT PEAK GRADIENT: 4 MMHG
ECHO LVOT PEAK VELOCITY: 100.04 CM/S
ECHO MV A VELOCITY: 83.96 CM/S
ECHO MV AREA PHT: 4.6 CM2
ECHO MV E DECELERATION TIME (DT): 164.2 MS
ECHO MV E VELOCITY: 72.59 CM/S
ECHO MV E/A RATIO: 0.86
ECHO MV E/E' LATERAL: 7.92
ECHO MV E/E' RATIO (AVERAGED): 9.21
ECHO MV E/E' SEPTAL: 10.51
ECHO MV PRESSURE HALF TIME (PHT): 47.6 MS
ECHO PV MAX VELOCITY: 113.21 CM/S
ECHO PV PEAK GRADIENT: 5.1 MMHG
ECHO RV TAPSE: 1.59 CM (ref 1.5–2)
EST. AVERAGE GLUCOSE BLD GHB EST-MCNC: 114 MG/DL
HBA1C MFR BLD: 5.6 % (ref 4.2–6.3)
HDLC SERPL-MCNC: 52 MG/DL
HDLC SERPL: 3.4 {RATIO} (ref 0–5)
LDLC SERPL CALC-MCNC: 102 MG/DL (ref 0–100)
LIPID PROFILE,FLP: ABNORMAL
TRIGL SERPL-MCNC: 120 MG/DL (ref ?–150)
VLDLC SERPL CALC-MCNC: 24 MG/DL

## 2019-10-14 PROCEDURE — 90686 IIV4 VACC NO PRSV 0.5 ML IM: CPT | Performed by: HOSPITALIST

## 2019-10-14 PROCEDURE — 97535 SELF CARE MNGMENT TRAINING: CPT

## 2019-10-14 PROCEDURE — 74011250636 HC RX REV CODE- 250/636: Performed by: HOSPITALIST

## 2019-10-14 PROCEDURE — 97165 OT EVAL LOW COMPLEX 30 MIN: CPT

## 2019-10-14 PROCEDURE — 97161 PT EVAL LOW COMPLEX 20 MIN: CPT

## 2019-10-14 PROCEDURE — C8929 TTE W OR WO FOL WCON,DOPPLER: HCPCS

## 2019-10-14 PROCEDURE — 74011000250 HC RX REV CODE- 250: Performed by: HOSPITALIST

## 2019-10-14 PROCEDURE — 74011250637 HC RX REV CODE- 250/637: Performed by: HOSPITALIST

## 2019-10-14 PROCEDURE — 90471 IMMUNIZATION ADMIN: CPT

## 2019-10-14 PROCEDURE — 99218 HC RM OBSERVATION: CPT

## 2019-10-14 RX ORDER — GUAIFENESIN 100 MG/5ML
81 LIQUID (ML) ORAL DAILY
Qty: 30 TAB | Refills: 0 | Status: SHIPPED | OUTPATIENT
Start: 2019-10-15 | End: 2020-03-10

## 2019-10-14 RX ADMIN — SODIUM CHLORIDE 1.5 ML: 9 INJECTION INTRAMUSCULAR; INTRAVENOUS; SUBCUTANEOUS at 11:00

## 2019-10-14 RX ADMIN — INFLUENZA VIRUS VACCINE 0.5 ML: 15; 15; 15; 15 SUSPENSION INTRAMUSCULAR at 16:27

## 2019-10-14 RX ADMIN — ASPIRIN 81 MG CHEWABLE TABLET 81 MG: 81 TABLET CHEWABLE at 09:21

## 2019-10-14 NOTE — PROGRESS NOTES
Spiritual Care Assessment/Progress Note  [facility]      NAME: Citlaly Magdaleno      MRN: [id number]  AGE: 61 y.o. SEX: male  Sikhism Affiliation: Holiness   Language: English     10/14/2019     Total Time (in minutes): 5     Spiritual Assessment begun in [address] through conversation with:         [x]Patient        [x] Family    [] Friend(s)        Reason for Consult: [facility]     Spiritual beliefs: (Please include comment if needed)     [x] Identifies with a dominique tradition:   Holiness      [x] Supported by a dominique community: Not a Episcopal goer           [] Claims no spiritual orientation:           [] Seeking spiritual identity:                [] Adheres to an individual form of spirituality:           [] Not able to assess:                           Identified resources for coping:      [x] Prayer                               [x] Music                  [] Guided Imagery     [] Family/friends                 [] Pet visits     [] Devotional reading                         [] Unknown     [] Other:                                             Interventions offered during this visit: (See comments for more details)    Patient Interventions: Affirmation of dominique, Communion [employer]), Initial/Spiritual assessment, patient floor, Prayer (actual), Prayer (assurance of)     Family/Friend(s):  Affirmation of dominique, Prayer (actual), Prayer (assurance of)     Plan of Care:     [x] Support spiritual and/or cultural needs    [] Support AMD and/or advance care planning process      [] Support grieving process   [] Coordinate Rites and/or Rituals    [] Coordination with community clergy   [] No spiritual needs identified at this time   [] Detailed Plan of Care below (See Comments)  [] Make referral to Music Therapy  [] Make referral to Pet Therapy     [] Make referral to Addiction services  [] Make referral to [facility]  [] Make referral to Spiritual Care Partner  [] No future visits requested        [] Follow up visits as needed     Comments: Mr. George Cox was visited by [employer] and received communion today. He is a convert and his wife attends the "[employer]. He reports that he doesn't attend Mormon very often and is not affiliated with a specific [place] community. Prayer offered. He is hoping to go home today.     LENA Dos Santos, RN, ACSW, LCSW   Page:  [phone]

## 2019-10-14 NOTE — PROGRESS NOTES
Speech Pathology  Orders received. Chart reviewed. MRI was normal. Spoke with RN, patient and patient's wife (retired SLP), who all reported no concerns with swallowing, speech/language or cognition. Patient tolerating regular diet/thin liquids. There are no SLP needs at this time.

## 2019-10-14 NOTE — PROGRESS NOTES
Hospitalist Progress Note  Jairon To MD  Answering service: [phone] from in house phone        Date of Service:  10/14/2019  NAME:  Sharin Cheadle  :  1960  MRN:  [id number]      Admission Summary:     Mr. Celia Elder is a 63-year-old male who presents to the ER with complaints of feeling dizzy.  He said he was sitting down at 10:30 AM when he suddenly felt dizzy.  He did not try to get up and was not moving when this happened. [facility] did try to walk shortly afterwards and he felt too unsteady to walk well.  Said he kept having to grab onto the wall and was falling to the side.  He denies any similar symptoms to this previously. [facility] had a history of positional vertigo. Ronda Bonds, he had a sensation of the room spinning at that time. [facility] does not have that at all at this time. [facility] denies any other numbness or weakness to his arms or legs.  He does report having had a diarrheal illness where he had about 3-4 episodes of diarrhea each day for the last 3 to 4 days. Interval history / Subjective:     He said he feels better, he feels light headedness when he gets up, no left side chest pain or shortness of breath     Assessment & Plan:     Dizziness and off balance unclear etiology, possible due to viral illness   -continue aspirin, increase doses of crestor   -CT head no acute intracranial process, there is question of a 4 mm aneurysm left middle cerebral artery trifurcation. No prior studies are available for comparison. -CTA head no acute abnormality.  Negative CTA head and neck  -MRI normal  -lipid panel , HDL52,   -EKG normal vent rate 71 bpm vent rate non specific st t wave   -Echo LV EF 65% no regional wall motion abnormalities   -no significant orthostatic hypotension   -PT/OT/speech therapist   -Neurologist is consulted    Diarrhea possible due to viral gastroenteritis   -has some abdominal discomfort, last bowel movement was on 10/13   -normal saline  -encourage to hydrate himself       Code status: Full Code  DVT prophylaxis: SCD    Care Plan discussed with: Patient/Family and Nurse  Disposition: home with family     Hospital Problems  Date Reviewed: 12/7/2018          Codes Class Noted POA    TIA (transient ischemic attack) ICD-10-CM: G45.9  ICD-9-CM: 435.9  10/13/2019 Unknown                Vital Signs:    Last 24hrs VS reviewed since prior progress note. Most recent are:  Visit Vitals  BP 92/49   Pulse 64   Temp 97.9 °F (36.6 °C)   Resp 16   Ht 6' 1\" (1.854 m)   Wt 123 kg (271 lb 2.7 oz)   SpO2 97%   BMI 35.78 kg/m²         Intake/Output Summary (Last 24 hours) at 10/14/2019 1134  Last data filed at 10/13/2019 1421  Gross per 24 hour   Intake --   Output 600 ml   Net -600 ml        Physical Examination:             Constitutional:  No acute distress, cooperative, pleasant    ENT:  Oral mucous moist, oropharynx benign. Resp:  CTA bilaterally. No wheezing/rhonchi/rales. No accessory muscle use   CV:  Regular rhythm, normal rate, no murmurs, gallops, rubs    GI:  Soft, non distended, non tender. normoactive bowel sounds, no hepatosplenomegaly     Musculoskeletal:  No edema    Neurologic:  Moves all extremities. AAOx3, CN II-XII reviewed     Skin:  Good turgor, no rashes or ulcers       Data Review:    Review and/or order of clinical lab test  Review and/or order of tests in the radiology section of CPT  Review and/or order of tests in the medicine section of CPT      Labs:     Recent Labs     10/13/19  1311   WBC 6.9   HGB 13.4   HCT 41.3        Recent Labs     10/13/19  1311      K 4.0      CO2 28   BUN 15   CREA 1.19   *   CA 9.1     Recent Labs     10/13/19  1311   SGOT 22   ALT 38   AP 83   TBILI 0.3   TP 7.8   ALB 3.4*   GLOB 4.4*     Recent Labs     10/13/19  1311   INR 1.0   PTP 10.2   APTT 26.0      No results for input(s): FE, TIBC, PSAT, FERR in the last 72 hours.    No results found for: FOL, RBCF   No results for input(s): PH, PCO2, PO2 in the last 72 hours.   Recent Labs     10/13/19  1311   TROIQ <0.05     Lab Results   Component Value Date/Time    Cholesterol, total 178 10/13/2019 01:11 PM    HDL Cholesterol 52 10/13/2019 01:11 PM    LDL, calculated 102 (H) 10/13/2019 01:11 PM    Triglyceride 120 10/13/2019 01:11 PM    CHOL/HDL Ratio 3.4 10/13/2019 01:11 PM     Lab Results   Component Value Date/Time    Glucose (POC) 116 (H) 10/13/2019 12:51 PM     Lab Results   Component Value Date/Time    Color YELLOW/STRAW 07/12/2016 07:22 AM    Appearance CLEAR 07/12/2016 07:22 AM    Specific gravity <1.005 07/12/2016 07:22 AM    pH (UA) 5.5 07/12/2016 07:22 AM    Protein NEGATIVE  07/12/2016 07:22 AM    Glucose NEGATIVE  07/12/2016 07:22 AM    Ketone NEGATIVE  07/12/2016 07:22 AM    Bilirubin NEGATIVE  07/12/2016 07:22 AM    Urobilinogen 0.2 07/12/2016 07:22 AM    Nitrites NEGATIVE  07/12/2016 07:22 AM    Leukocyte Esterase NEGATIVE  07/12/2016 07:22 AM    Epithelial cells FEW 07/12/2016 07:22 AM    Bacteria NEGATIVE  07/12/2016 07:22 AM    WBC 0-4 07/12/2016 07:22 AM    RBC 0-5 07/12/2016 07:22 AM         Medications Reviewed:     Current Facility-Administered Medications   Medication Dose Route Frequency    albuterol (PROVENTIL VENTOLIN) nebulizer solution 2.5 mg  2.5 mg Nebulization Q6H PRN    rosuvastatin (CRESTOR) tablet 10 mg  10 mg Oral QHS    acetaminophen (TYLENOL) tablet 650 mg  650 mg Oral Q4H PRN    Or    acetaminophen (TYLENOL) solution 650 mg  650 mg Per NG tube Q4H PRN    Or    acetaminophen (TYLENOL) suppository 650 mg  650 mg Rectal Q4H PRN    aspirin chewable tablet 81 mg  81 mg Oral DAILY    influenza vaccine 2019-20 (6 mos+)(PF) (FLUARIX/FLULAVAL/FLUZONE QUAD) injection 0.5 mL  0.5 mL IntraMUSCular PRIOR TO DISCHARGE     ______________________________________________________________________  EXPECTED LENGTH OF STAY: - - -  ACTUAL LENGTH OF STAY:          0 Neris Yepez MD

## 2019-10-14 NOTE — PROGRESS NOTES
Bedside shift change report given to [facility] (oncoming nurse) by Levonne Kussmaul, RN (offgoing nurse). Report included the following information SBAR.

## 2019-10-14 NOTE — DISCHARGE INSTRUCTIONS
Discharge Instructions       PATIENT ID: Odalis Lowry  MRN: [id number]   YOB: 1960    DATE OF ADMISSION: 10/13/2019 12:50 PM    DATE OF DISCHARGE: 10/14/2019    PRIMARY CARE PROVIDER: Shannan Vigil NP     ATTENDING PHYSICIAN: Christa Gresham MD  DISCHARGING PROVIDER: Lana Jerry MD    To contact this individual call [phone] and ask the  to page. If unavailable ask to be transferred the Adult Hospitalist Department. DISCHARGE DIAGNOSES   Dizziness and off balance unclear etiology, possible due to viral illness   Diarrhea possible due to viral gastroenteritis     CONSULTATIONS: IP CONSULT TO NEUROLOGY    PROCEDURES/SURGERIES: * No surgery found *    PENDING TEST RESULTS:   At the time of discharge the following test results are still pending: None    FOLLOW UP APPOINTMENTS:   Follow-up Information     Follow up With Specialties Details Why Contact Info    Shannan Vigil NP Nurse Practitioner   [facility] [address]  [phone]             ADDITIONAL CARE RECOMMENDATIONS:     DIET: Cardiac Diet    ACTIVITY: Activity as tolerated    WOUND CARE: None    EQUIPMENT needed: None      DISCHARGE MEDICATIONS:   See Medication Reconciliation Form    · It is important that you take the medication exactly as they are prescribed. · Keep your medication in the bottles provided by the pharmacist and keep a list of the medication names, dosages, and times to be taken in your wallet. · Do not take other medications without consulting your doctor. NOTIFY YOUR PHYSICIAN FOR ANY OF THE FOLLOWING:   Fever over 101 degrees for 24 hours. Chest pain, shortness of breath, fever, chills, nausea, vomiting, diarrhea, change in mentation, falling, weakness, bleeding. Severe pain or pain not relieved by medications. Or, any other signs or symptoms that you may have questions about.       DISPOSITION:    Home With:   OT  PT  [facility]  RN       SNF/Inpatient Rehab/LTAC   x Independent/assisted living    Hospice    Other:     CDMP Checked:   Yes x     PROBLEM LIST Updated:  Yes x       Signed:   Isai Carrillo MD  10/14/2019  3:19 PM

## 2019-10-14 NOTE — CONSULTS
[address]    Name:  Sarita Galan  MR#:  [id number]  :  1960  ACCOUNT #:  [de-identified]  DATE OF SERVICE:  10/14/2019      REQUESTING PHYSICIAN:  Dr. Cheng Castellanos.    REASON FOR EVALUATION:  Dizziness. HISTORY OF PRESENT ILLNESS:  The patient is a 51-year-old male with a past medical history of benign positional vertigo and hypercholesterolemia who says that over the past 3 days or so he has had diarrhea multiple times a day and was feeling weak and drained. Yesterday morning, when he got up to go to the bathroom, he felt lightheaded and as if he was going to fall. He also felt unsteady on his feet. The symptoms continued as he was sitting on the toilet seat. He was concerned, and his wife brought him to the hospital.  He was admitted for further workup. His symptoms gradually subsided, and this morning, he feels much better. However, if he moves too quickly or in certain positions when he is lying down, the symptoms tend to return. Denies any changes in vision, speech, or focal motor or sensory deficits. No chest pain, shortness of breath, palpitations, nausea, or vomiting. PAST MEDICAL HISTORY:  As mentioned above. HOME MEDICATIONS:  Crestor, albuterol, and naproxen. ALLERGIES:  CORTISONE AND ZOCOR. SOCIAL HISTORY:  Lives with his wife. No history of smoking or alcohol use. PHYSICAL EXAMINATION:  GENERAL:  The patient is alert, fully oriented. VITAL SIGNS:  Blood pressure 141/72, temperature is 98.3, and pulse is 77. NEUROLOGIC:  Speech is clear. Comprehension is normal.  Pupils are equal, round, and reactive. Extraocular movements are full. Face is symmetric. Tongue is midline. Hearing is baseline. Muscle tone and bulk normal.  Strength normal in all extremities. Deep tendon reflexes are 2/2 and symmetric. Toes downgoing. Sensation intact. Gait is baseline. HEART:  Regular rate and rhythm. CHEST:  Clear. ABDOMEN:  Soft, nontender. Positive bowel sounds. EXTREMITIES:  No edema. LABORATORY DATA:  CBC is unremarkable. Chemistries are unremarkable. Lipid Profile:  Triglycerides 120, HDL 52, and . Hemoglobin A1c is 5.6. MRI scan of the brain did not show any acute abnormalities. CTA of the head and neck did not show any abnormalities. There is no large vessel occlusion or aneurysm. ASSESSMENT AND PLAN:  A 59-year-old male with a history of positional vertigo who has had diarrhea for the past 3 days. Yesterday, he developed lightheadedness and unsteadiness of gait as he got out of bed to go to the bathroom. I suspect that he most likely had peripheral vertigo combined with ongoing diarrhea for several days that may have caused orthostatic symptoms. His symptoms can be reproduced to a slight extent with [place] testing. His stroke workup otherwise was negative. No overall change in management. Okay to discharge home. Please call with questions. Thank you for this consultation.       Jairo Rankin MD      [id number]  D:  10/14/2019 15:21  T:  10/14/2019 19:08  JOB #:  [id number]

## 2019-10-14 NOTE — PROGRESS NOTES
Stroke Education provided to spouse/SO and the following topics were discussed    1. Patients personal risk factors for stroke are none    2. Warning signs of Stroke:        * Sudden numbness or weakness of the face, arm or leg, especially on one side of          The body            * Sudden confusion, trouble speaking or understanding        * Sudden trouble seeing in one or both eyes        * Sudden trouble walking, dizziness, loss of balance or coordination        * Sudden severe headache with no known cause      3. Importance of activation Emergency Medical Services ( 9-1-1 ) immediately if experience any warning signs of stroke.: Provided wife [employer] brochure and Magnet    4. Be sure and schedule a follow-up appointment with your primary care doctor or any specialists as instructed. 5. You must take medicine every day to treat your risk factors for stroke. Be sure to take your medicines exactly as your doctor tells you: no more, no less. Know what your medicines are for , what they do. Anti-thrombotics /anticoagulants can help prevent strokes. You are taking the following medicine(s) Aspirin    6. Smoking and second-hand smoke greatly increase your risk of stroke, cardiovascular disease and death. Smoking history never    7. Information provided was Stroke Handouts BEFAST brochure and Magnet    8. Documentation of teaching completed in Patient Education Activity and on Care Plan with teaching response noted?   no

## 2019-10-14 NOTE — DISCHARGE SUMMARY
Discharge Summary       PATIENT ID: Maricruz Hastings  MRN: [id number]   YOB: 1960    DATE OF ADMISSION: 10/13/2019 12:50 PM    DATE OF DISCHARGE: 10/14/2019   PRIMARY CARE PROVIDER: Raffi Horton NP     ATTENDING PHYSICIAN: Brandyn Ramos MD  DISCHARGING PROVIDER: Jamison Echevarria MD    To contact this individual call [phone] and ask the  to page. If unavailable ask to be transferred the Adult Hospitalist Department. CONSULTATIONS: IP CONSULT TO NEUROLOGY    PROCEDURES/SURGERIES: * No surgery found *    ADMITTING [address] [place] [address] COURSE:     Mr. Kashif Whittington is a 26-year-old male who presents to the ER with complaints of feeling dizzy.  He said he was sitting down at 10:30 AM when he suddenly felt dizzy.  He did not try to get up and was not moving when this happened. [facility] did try to walk shortly afterwards and he felt too unsteady to walk well.  Said he kept having to grab onto the wall and was falling to the side.  He denies any similar symptoms to this previously.  He had a history of positional vertigo.  However, he had a sensation of the room spinning at that time. [facility] does not have that at all at this time. [facility] denies any other numbness or weakness to his arms or legs.  He does report having had a diarrheal illness where he had about 3-4 episodes of diarrhea each day for the last 3 to 4 days. Dizziness and off balance unclear possible peripheral vertigo, possible due to viral illness   -continue aspirin, increase doses of crestor   -CT head no acute intracranial process, there is question of a 4 mm aneurysm left middle cerebral artery trifurcation. No prior studies are available for comparison. -CTA head no acute abnormality.  Negative CTA head and neck  -MRI normal  -lipid panel , HDL52,   -EKG normal vent rate 71 bpm vent rate non specific st t wave   -Echo LV EF 65% no regional wall motion abnormalities   -no significant orthostatic hypotension -PT/OT/speech therapist   -seen by Neurologist  Diarrhea possible due to viral gastroenteritis   -has some abdominal discomfort, last bowel movement was on 10/13   -normal saline  -encourage to hydrate himself         Code status: Full Code  DVT prophylaxis: SCD    DISCHARGE DIAGNOSES / PLAN:      Dizziness and off balance unclear possible peripheral vertigo, possible due to viral illness   -continue aspirin and crestor   -work up for CVA negative  -PT/OT/speech therapist   -seen by Neurologist  Diarrhea possible due to viral gastroenteritis   -resolved    PENDING TEST RESULTS:   At the time of discharge the following test results are still pending: None    FOLLOW UP APPOINTMENTS:    Follow-up Information     Follow up With Specialties Details Why Contact Info    Pranav Hernandez NP Nurse Practitioner In one week  [address]  [phone]               DIET: Cardiac Diet    ACTIVITY: Activity as tolerated    WOUND CARE: None    EQUIPMENT needed: None      DISCHARGE MEDICATIONS:  Current Discharge Medication List      START taking these medications    Details   aspirin 81 mg chewable tablet Take 1 Tab by mouth daily. Qty: 30 Tab, Refills: 0         CONTINUE these medications which have NOT CHANGED    Details   bismuth subsalicylate (PEPTO-BISMOL PO) Take  by mouth as needed. OTHER Take  by mouth six (6) days a week. prime secretagogue supplement      acetaminophen (TYLENOL EXTRA STRENGTH) 500 mg tablet Take 500 mg by mouth every six (6) hours as needed for Pain.      multivitamin (ONE A DAY) tablet Take 1 Tab by mouth daily. rosuvastatin (CRESTOR) 10 mg tablet TAKE 1 TABLET BY MOUTH EVERY NIGHT FOR CHOLESTEROL  Qty: 90 Tab, Refills: 0    Associated Diagnoses: Hypercholesterolemia      ubidecarenone (CO Q-10 PO) Take 1 Cap by mouth daily.       albuterol (PROAIR HFA) 90 mcg/actuation inhaler INHALE 2 INHALATIONS BY MOUTH EVERY 6 HOURS AS NEEDED FOR WHEEZING OR SHORTNESS OF BREATH  Qty: 1 Inhaler, Refills: 1               NOTIFY YOUR PHYSICIAN FOR ANY OF THE FOLLOWING:   Fever over 101 degrees for 24 hours. Chest pain, shortness of breath, fever, chills, nausea, vomiting, diarrhea, change in mentation, falling, weakness, bleeding. Severe pain or pain not relieved by medications. Or, any other signs or symptoms that you may have questions about. DISPOSITION:    Home With:   OT  PT  HH  RN       Long term SNF/Inpatient Rehab   x Independent/assisted living    Hospice    Other:       PATIENT CONDITION AT DISCHARGE:     Functional status    Poor     Deconditioned    x Independent      Cognition   x  Lucid     Forgetful     Dementia      Catheters/lines (plus indication)    Terrell     PICC     PEG    x None      Code status   x  Full code     DNR      PHYSICAL EXAMINATION AT DISCHARGE:  Patient Vitals for the past 24 hrs:   Temp Pulse Resp BP SpO2   10/14/19 1500 98.1 °F (36.7 °C) 76 20 116/56 98 %   10/14/19 1140 -- 77 -- 141/72 --   10/14/19 1138 -- 78 -- 125/76 --   10/14/19 1136 -- 76 -- 122/64 --   10/14/19 1100 98.1 °F (36.7 °C) 73 20 119/76 --   10/14/19 1033 -- -- -- 92/49 --   10/14/19 0800 -- -- -- -- 97 %   10/14/19 0615 97.9 °F (36.6 °C) 64 16 92/49 97 %   10/14/19 0155 97.7 °F (36.5 °C) 66 17 114/56 97 %   10/13/19 2152 98.2 °F (36.8 °C) 75 17 116/67 95 %     General:          Alert, cooperative, no distress, appears stated age. HEENT:           Atraumatic, anicteric sclerae, pink conjunctivae                          No oral ulcers, mucosa moist, throat clear, dentition fair  Neck:               Supple, symmetrical  Lungs:             Clear to auscultation bilaterally. No Wheezing or Rhonchi. No rales. Chest wall:      No tenderness  No Accessory muscle use. Heart:              Regular  rhythm,  No  murmur   No edema  Abdomen:        Soft, non-tender. Not distended. Bowel sounds normal  Extremities:     No cyanosis.   No clubbing,                            Skin turgor normal, Capillary refill normal  Skin:                Not pale. Not Jaundiced  No rashes   Psych:             Not anxious or agitated. Neurologic:      Alert, moves all extremities, answers questions appropriately and responds to commands       CHRONIC MEDICAL DIAGNOSES:  Problem List as of 10/14/2019 Date Reviewed: 12/7/2018          Codes Class Noted - Resolved    TIA (transient ischemic attack) ICD-10-CM: G45.9  ICD-9-CM: 435.9  10/13/2019 - Present        Moderate persistent asthma without complication PATEL52-BQ: Z95.09  ICD-9-CM: 493.90  12/7/2018 - Present        Obesity (BMI 30-39. 9) ICD-10-CM: E66.9  ICD-9-CM: 278.00  12/18/2017 - Present        Incidental pulmonary nodule, > 3mm and < 8mm ICD-10-CM: R91.1  ICD-9-CM: 793.11  7/21/2016 - Present        Hypercholesterolemia (Chronic) ICD-10-CM: E78.00  ICD-9-CM: 272.0  7/12/2016 - Present        Hypothalamic dysfunction (HCC) (Chronic) ICD-10-CM: E23.6  ICD-9-CM: 253.8  7/12/2016 - Present        RESOLVED: Acute asthma ICD-10-CM: J45.909  ICD-9-CM: 493.90  7/12/2016 - 7/14/2016            Recent Results (from the past 24 hour(s))   ECHO ADULT COMPLETE    Collection Time: 10/14/19 10:37 AM   Result Value Ref Range    LV E' Lateral Velocity 9.16 cm/s    LV E' Septal Velocity 6.91 cm/s    Tapse 1.59 1.5 - 2.0 cm    Ao Root D 3.71 cm    Aortic Valve Systolic Peak Velocity 338.60 cm/s    AoV PG 6.1 mmHg    LVIDd 3.99 (A) 4.2 - 5.9 cm    LVPWd 0.90 0.6 - 1.0 cm    LVIDs 2.82 cm    IVSd 0.85 0.6 - 1.0 cm    LVOT Peak Velocity 100.04 cm/s    LVOT Peak Gradient 4.0 mmHg    MVA (PHT) 4.6 cm2    MV A Thierry 83.96 cm/s    MV E Thierry 72.59 cm/s    MV E/A 0.86     Left Atrium to Aortic Root Ratio 0.98     LV Mass .8 88 - 224 g    LV Mass AL Index 47.7 49 - 115 g/m2    E/E' lateral 7.92     E/E' septal 10.51     E/E' ratio (averaged) 9.21     Mitral Valve E Wave Deceleration Time 164.2 ms    Mitral Valve Pressure Half-time 47.6 ms    Left Atrium Major Axis 3.65 cm Pulmonic Valve Max Velocity 113.21 cm/s    AV Cusp 2.67 cm    PV peak gradient 5.1 mmHg     Greater than 45 minutes were spent with the patient on counseling and coordination of care    Signed:   Marivel Rush MD  10/14/2019  3:20 PM

## 2019-10-14 NOTE — ANCILLARY DISCHARGE INSTRUCTIONS
[facility].       10/14/2019      RE: Sharin Cheadle      To Whom it May Concern: This is to certify that Sharin Cheadle was admitted to hospital on 10/13/2019   to 10/14/2019. He may return to work on 10/16/2019. Thank you for your assistance in this matter.     Sincerely,      Jairon To MD   Adult Hospitalist   [facility] [place] [phone]

## 2019-10-14 NOTE — PROGRESS NOTES
Reason for Admission:   Dizziness                    RRAT Score:        7             Plan for utilizing home health: Therapy evaluated and no recommondations at this time. Current Advanced Directive/Advance Care Plan:  Not on file                         Transition of Care Plan:       Discharge home today. No anticipated needs identified. Independent with no DME noted. Family will transport at the time of discharge. Observation notice provided in writing to patient and/or caregiver as well as verbal explanation of the policy. Patients who are in outpatient status also receive the Observation notice. Care Management Interventions  PCP Verified by CM: Yes  Palliative Care Criteria Met (RRAT>21 & CHF Dx)?: No  Mode of Transport at Discharge:  Other (see comment)  Discharge Durable Medical Equipment: No  Physical Therapy Consult: Yes  Occupational Therapy Consult: Yes  Current Support Network: Lives with Spouse  Confirm Follow Up Transport: Family  Plan discussed with Pt/Family/Caregiver: Yes  [place] Resource Information Provided?: No  Discharge Location  Discharge Placement: Home    RAOUL Brown,CRM

## 2019-10-14 NOTE — PROGRESS NOTES
Problem: TIA/CVA Stroke: 0-24 hours  Goal: Activity/Safety  Outcome: Progressing Towards Goal  Goal: Consults, if ordered  Outcome: Progressing Towards Goal  Goal: Diagnostic Test/Procedures  Outcome: Progressing Towards Goal  Goal: Nutrition/Diet  Outcome: Progressing Towards Goal  Goal: Discharge Planning  Outcome: Progressing Towards Goal  Goal: Medications  Outcome: Progressing Towards Goal  Goal: Respiratory  Outcome: Progressing Towards Goal  Goal: Treatments/Interventions/Procedures  Outcome: Progressing Towards Goal  Goal: Minimize risk of bleeding post-thrombolytic infusion  Outcome: Progressing Towards Goal  Goal: Monitor for complications post-thrombolytic infusion  Outcome: Progressing Towards Goal  Goal: Psychosocial  Outcome: Progressing Towards Goal  Goal: *Hemodynamically stable  Outcome: Progressing Towards Goal  Goal: *Neurologically stable  Description  Absence of additional neurological deficits    Outcome: Progressing Towards Goal  Goal: *Verbalizes anxiety and depression are reduced or absent  Outcome: Progressing Towards Goal     Problem: Falls - Risk of  Goal: *Absence of Falls  Description  Document Jeanette Fall Risk and appropriate interventions in the flowsheet.   Outcome: Progressing Towards Goal  Note:   Fall Risk Interventions:

## 2019-10-14 NOTE — INTERDISCIPLINARY ROUNDS
I have reviewed discharge instructions with the patient and spouse. The patient and spouse verbalized understanding. Pt discharged home via car by his wife. Care plan and education closed. NIH> 0     B-E-F-A-S-T scale/ Heart attack s/s reviewed with the pt. Pt has stroke education booklet. Discharge medications Aspirin  reviewed with patient and appropriate educational materials and side effects teaching were provided. Pt has hard copy script. Pt has personal belongings along with MD (sick) note for work. Pt will f/u with Family practice PCP/NP in 2 weeks. No visible s/s of distress. No complaints of pain or discomfort. Sign AVS and BRAINS on chart.

## 2019-10-14 NOTE — PROGRESS NOTES
PHYSICAL THERAPY EVALUATION WITH DISCHARGE  Patient: Jackelyn Cockayne (59 y.o. male)  Date: 10/14/2019  Primary Diagnosis: TIA (transient ischemic attack) [G45.9]       Precautions:          ASSESSMENT  Based on the objective data described below, the patient presents with normal strength, ROM, intact dynamic standing and sitting balance, and steady gait consistent with his baseline level following admission for TIA workup. Patient endorses complete resolution of prior symptoms, which were dizziness and unsteadiness with gait. He has a hx of positional vertigo episodes but does report this was much worse. MRI was negative for acute abnormality. Today, he exhibited good steadiness during a variety of functional tasks. He does report stiffness in his back which is chronic, but otherwise had no complaints. No PT needs identified, patient very low risk for falls. Will complete the order. Functional Outcome Measure: The patient scored Total: 54/56 on the Jenkins Balance Assessment which is indicative of low fall risk. Other factors to consider for discharge: lives with wife on ground level of home, active and indep PTA, works full time     Further skilled acute physical therapy is not indicated at this time. PLAN :  Recommendation for discharge: (in order for the patient to meet his/her long term goals)  No skilled physical therapy/ follow up rehabilitation needs identified at this time. This discharge recommendation:  Has been made in collaboration with the attending provider and/or case management    Equipment recommendations for successful discharge: none         SUBJECTIVE:   Patient stated I'm fine now.     OBJECTIVE DATA SUMMARY:   HISTORY:    Past Medical History:   Diagnosis Date    Anal fissure     Arthritis     Asthma     Benign positional vertigo     Hematuria of undiagnosed cause 01/2018    Hypercholesterolemia     Hypothalamic syndrome ([facility] [address] 1969    s/t TBI, treated with growth hormone    Pulmonary nodule seen on imaging study 16    4 mm bilaterally, repeat CT in 1 year     Past Surgical History:   Procedure Laterality Date    HX COLONOSCOPY  2018    HX ORTHOPAEDIC      bilateral hips    HX UROLOGICAL  2018    cystoscopy with benign findings       Prior level of function: indep and active, works [employer]  Personal factors and/or comorbidities impacting plan of care: hx of postiional vertigo    Home Situation  Home Environment: Private residence  # Steps to Enter: 3  One/Two Story Residence: One story  Living Alone: No  Support Systems: Spouse/Significant Other/Partner  Patient Expects to be Discharged to[de-identified] Private residence  Current DME Used/Available at Home: Cane, straight    EXAMINATION/PRESENTATION/DECISION MAKING:   Critical Behavior:  Neurologic State: Alert  Orientation Level: Oriented X4  Cognition: Appropriate decision making, Appropriate for age attention/concentration, Appropriate safety awareness, Follows commands, Recognition of people/places     Hearing: Auditory  Auditory Impairment: None    Range Of Motion:  AROM: Within functional limits           PROM: Within functional limits           Strength:    Strength:  Within functional limits                    Tone & Sensation:   Tone: Normal              Sensation: Intact               Coordination:  Coordination: Within functional limits  Vision:      Functional Mobility:  Bed Mobility:  Rolling: Independent  Supine to Sit: Independent  Sit to Supine: Independent  Scooting: Independent  Transfers:  Sit to Stand: Independent  Stand to Sit: Independent        Bed to Chair: Independent              Balance:   Sitting: Intact  Standing: Intact  Ambulation/Gait Training:  Distance (ft): 150 Feet (ft)  Assistive Device: Gait belt  Ambulation - Level of Assistance: Independent                                                    Functional Measure  Jenkins Balance Test:    Sitting to Standin  Standing Unsupported: 4  Sitting with Back Unsupported: 4  Standing to Sittin  Transfers: 4  Standing Unsupported with Eyes Closed: 4  Standing Unsupported with Feet Together: 4  Reach Forward with Outstretched Arm: 4   Object: 4  Turn to Look Over Shoulders: 4  Turn 360 Degrees: 4  Alternate Foot on Step/Stool: 4  Standing Unsupported One Foot in Front: 4  Stand on One Le  Total: 54/56         56=Maximum possible score;   0-20=High fall risk  21-40=Moderate fall risk   41-56=Low fall risk        Physical Therapy Evaluation Charge Determination   History Examination Presentation Decision-Making   MEDIUM  Complexity : 1-2 comorbidities / personal factors will impact the outcome/ POC  MEDIUM Complexity : 3 Standardized tests and measures addressing body structure, function, activity limitation and / or participation in recreation  MEDIUM Complexity : Evolving with changing characteristics  Other outcome measures Jenkins 54/56  LOW       Based on the above components, the patient evaluation is determined to be of the following complexity level: LOW     Pain Rating:  none    Activity Tolerance:   Good  Please refer to the flowsheet for vital signs taken during this treatment. After treatment patient left in no apparent distress:   with OT in the bathroom performing self-care    COMMUNICATION/EDUCATION:   The patients plan of care was discussed with: Occupational Therapist and Registered Nurse. Patient and/or family was verbally educated on the BE FAST acronym for signs/symptoms of CVA and TIA. BE FAST was written on patient's communication board  for visual education and reinforcement. All questions answered with patient indicating good understanding. Fall prevention education was provided and the patient/caregiver indicated understanding.     Thank you for this referral.  Jacque Sifuentes, PT   Time Calculation: 15 mins

## 2019-10-14 NOTE — PROGRESS NOTES
OCCUPATIONAL THERAPY EVALUATION/Discharge  Patient: Luis Alberto Sandoval (62 y.o. male)  Date: 10/14/2019  Primary Diagnosis: TIA (transient ischemic attack) [G45.9]       Precautions:   Fall    ASSESSMENT  Based on the objective data described below, the patient presents with the patient presents with  strength, ROM, and functional mobility at baseline when completing ADLs following admission for TIA workup. Pt agreeable to participating in therapy with wife at bedside. Pt states that he previously used cane due to lower back pain and was I for all ADLs. 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 Pt completed bed mobility and sit > stand transfer with S. Pt presenting with intact sitting and standing balance when performing fxnl mobility to perform toilet transfer with S. Following toileting, pt completed standing grooming/hygeine tasks including teeth brushing and face washing with Set-up/S. Pt showing no signs of fatigue and reporting no dizziness during tasks. Overall pt is reporting and observed to be at is at baseline functional level. Pt is showing increasing independence with ADLs and is cleared from OT standpoint at this time. No recommendations for OT standpoint upon DC at this time. Current Level of Function Impacting Discharge (ADLs/self-care): Pt is currently independent-SBA for ADLs. Functional Outcome Measure: The patient scored Total: 90/100 on the Barthel Index outcome measure which is indicative of minimal impaired ability to care for basic self needs/dependency on others. Other factors to consider for discharge: Pt lives at home with wife, Independent PLOF     Patient will benefit from skilled therapy intervention to address the above noted impairments.        PLAN :    Recommendation for discharge: (in order for the patient to meet his/her long term goals)  No skilled occupational therapy/ follow up rehabilitation needs identified at this time. This discharge recommendation:  Has been made in collaboration with the attending provider and/or case management    Equipment recommendations for successful discharge (if) home: patient owns DME required for discharge       SUBJECTIVE:   Patient stated I am doing what I was doing at home.     OBJECTIVE DATA SUMMARY:   HISTORY:   Past Medical History:   Diagnosis Date    Anal fissure     Arthritis     Asthma     Benign positional vertigo     Hematuria of undiagnosed cause 01/2018    Hypercholesterolemia     Hypothalamic syndrome ([address] 1969    s/t TBI, treated with growth hormone    Pulmonary nodule seen on imaging study 7/12/16    4 mm bilaterally, repeat CT in 1 year     Past Surgical History:   Procedure Laterality Date    HX COLONOSCOPY  01/2018    HX ORTHOPAEDIC      bilateral hips    HX UROLOGICAL  2018    cystoscopy with benign findings       Expanded or extensive additional review of patient history:     Home Situation  Home Environment: Private residence  # Steps to Enter: 3  Rails to Enter: Yes  Hand Rails : Bilateral  Wheelchair Ramp: No  One/Two Story Residence: Two story, live on 1st floor  # of Interior Steps: 13  Living Alone: No  Support Systems: Spouse/Significant Other/Partner, Family member(s), Friends \ neighbors  Patient Expects to be Discharged to[de-identified] Private residence  Current DME Used/Available at Home: Cane, straight  Tub or Shower Type: Tub/Shower combination  EXAMINATION OF PERFORMANCE DEFICITS:  Cognitive/Behavioral Status:  Neurologic State: Alert  Orientation Level: Oriented X4  Cognition: Appropriate decision making; Appropriate safety awareness; Follows commands  Perception: Appears intact  Perseveration: No perseveration noted  Safety/Judgement: Awareness of environment; Fall prevention; Insight into deficits    Skin: appears intact    Edema: no edema noted    Hearing:   Auditory  Auditory Impairment: None    Vision/Perceptual: Acuity: Within Defined Limits    Corrective Lenses: Glasses    Range of Motion:  WFL  AROM: Within functional limits  PROM: Within functional limits                      Strength:  Strength: Within functional limits                Coordination:  Coordination: Within functional limits  Fine Motor Skills-Upper: Left Intact; Right Intact    Gross Motor Skills-Upper: Left Intact; Right Intact    Tone & Sensation:  Tone: Normal  Sensation: Intact                      Balance:  Sitting: Intact  Standing: Intact    Functional Mobility and Transfers for ADLs:  Bed Mobility:  Rolling: Independent  Supine to Sit: Independent  Sit to Supine: Independent  Scooting: Independent    Transfers:  Sit to Stand: Independent  Stand to Sit: Independent  Bed to Chair: Independent  Bathroom Mobility: Modified independent  Toilet Transfer : Modified independent    ADL Assessment:  Feeding: Independent    Oral Facial Hygiene/Grooming: Independent    Bathing: Modified independent    Upper Body Dressing: Independent    Lower Body Dressing: Independent    Toileting: Independent                ADL Intervention and task modifications:  Feeding  Drink to Mouth: Independent    Grooming  Grooming Assistance: (P) Set-up; Supervision  Washing Face: (P) Set-up; Supervision  Washing Hands: (P) Set-up; Supervision  Brushing Teeth: (P) Set-up; Supervision  Brushing/Combing Hair: (P) Independent                        Toileting  Toileting Assistance: Independent  Bladder Hygiene: Independent  Bowel Hygiene: Independent  Clothing Management: Independent    Cognitive Retraining  Safety/Judgement: Awareness of environment; Fall prevention; Insight into deficits     Functional Measure:  Barthel Index:    Bathin  Bladder: 5  Bowels: 5  Groomin  Dressing: 10  Feeding: 10  Mobility: 15  Stairs: 10  Toilet Use: 10  Transfer (Bed to Chair and Back): 15  Total: 90/100        The Barthel ADL Index: Guidelines  1.  The index should be used as a record of what a patient does, not as a record of what a patient could do. 2. The main aim is to establish degree of independence from any help, physical or verbal, however minor and for whatever reason. 3. The need for supervision renders the patient not independent. 4. A patient's performance should be established using the best available evidence. Asking the patient, friends/relatives and nurses are the usual sources, but direct observation and common sense are also important. However direct testing is not needed. 5. Usually the patient's performance over the preceding 24-48 hours is important, but occasionally longer periods will be relevant. 6. Middle categories imply that the patient supplies over 50 per cent of the effort. 7. Use of aids to be independent is allowed. Wendy Medina., Barthel, D.W. (6893). Functional evaluation: the Barthel Index. [facility] (14)2. LIZA Mukherjee, Alexis Mead., Bailey Tidwell., Dunia Nassar, [address] [place] [address] (1999). Measuring the change indisability after inpatient rehabilitation; comparison of the responsiveness of the Barthel Index and Functional Cook Measure. Journal of Neurology, Neurosurgery, and Psychiatry, 66(4), 601-177. Siri Montez, N.[place].A, JEREMY Blackwell.RAYMOND, & Urmila Guerra, M.A. (2004.) Assessment of post-stroke quality of life in cost-effectiveness studies: The usefulness of the Barthel Index and the EuroQoL-5D.  Quality of Life Research, 15, 567-11         Occupational Therapy Evaluation Charge Determination   History Examination Decision-Making   LOW Complexity : Brief history review  LOW Complexity : 1-3 performance deficits relating to physical, cognitive , or psychosocial skils that result in activity limitations and / or participation restrictions  LOW Complexity : No comorbidities that affect functional and no verbal or physical assistance needed to complete eval tasks       Based on the above components, the patient evaluation is determined to be of the following complexity level: LOW   Pain Rating:  No report of pain before, during or after session. Activity Tolerance:   Good  Please refer to the flowsheet for vital signs taken during this treatment. After treatment patient left in no apparent distress:    Supine in bed, Call bell within reach and Caregiver / family present    COMMUNICATION/EDUCATION:   The patients plan of care was discussed with: Physical Therapist and Registered Nurse. This patients plan of care is appropriate for delegation to [facility]. Thank you for this referral.  Asha Brock  Time Calculation: 20 mins     Regarding student involvement in patient care:  A student participated in this treatment session. Per CMS Medicare statements and AOTA guidelines I certify that the following was true:  1. I was present and directly observed the entire session. 2. I made all skilled judgments and clinical decisions regarding care. 3. I am the practitioner responsible for assessment, treatment, and documentation.     Breanna Barnes, OT

## 2019-10-15 ENCOUNTER — PATIENT OUTREACH (OUTPATIENT)
Dept: FAMILY MEDICINE CLINIC | Age: 59
End: 2019-10-15

## 2019-10-15 NOTE — PROGRESS NOTES
Hospital Discharge Follow-Up      Date/Time:  10/15/2019 2:10 PM    Patient was admitted to [facility] on 10/13 and discharged on 10/14 for dizziness/diarrhea/viral gastroenteritis. The physician discharge summary was available at the time of outreach. Patient was contacted within 1 business days of discharge. Top Challenges reviewed with the provider   [facility] 10/13-10/14 dizziness/diarrhea/? Viral gastroenteritis. 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 Advance Care Planning:   Does patient have an Advance Directive:  not on file; education provided        Method of communication with provider :chart routing  Inpatient RRAT score: not available  Was this a readmission? no   Patient stated reason for the readmission: caitlin    Care Transition Nurse (CTN) contacted the patient by telephone to perform post hospital discharge assessment. Verified name and  with patient as identifiers. Provided introduction to self, and explanation of the CTN role. Patient received hospital discharge instructions. CTN reviewed discharge instructions and red flags with patient who verbalized understanding. Patient given an opportunity to ask questions and does not have any further questions or concerns at this time. The patient agrees to contact the PCP office for questions related to their healthcare. CTN provided contact information for future reference. Disease Specific:   N/A    Patients top risk factors for readmission:  None noted at this time.     Home Health orders at discharge: [address]: na  Date of initial visit: na    Durable Medical Equipment ordered at discharge: none  [facility] [address] received: na    Medication(s):   New Medications at Discharge: ASA 81mg qd  Changed Medications at Discharge: none  Discontinued Medications at Discharge: none    Medication reconciliation was performed with patient, who verbalizes understanding of administration of home medications. There were no barriers to obtaining medications identified at this time. Referral to Pharm D needed: no     Current Outpatient Medications   Medication Sig    aspirin 81 mg chewable tablet Take 1 Tab by mouth daily.  bismuth subsalicylate (PEPTO-BISMOL PO) Take  by mouth as needed.  OTHER Take  by mouth six (6) days a week. prime secretagogue supplement    acetaminophen (TYLENOL EXTRA STRENGTH) 500 mg tablet Take 500 mg by mouth every six (6) hours as needed for Pain.  multivitamin (ONE A DAY) tablet Take 1 Tab by mouth daily.  rosuvastatin (CRESTOR) 10 mg tablet TAKE 1 TABLET BY MOUTH EVERY NIGHT FOR CHOLESTEROL    ubidecarenone (CO Q-10 PO) Take 1 Cap by mouth daily.  albuterol (PROAIR HFA) 90 mcg/actuation inhaler INHALE 2 INHALATIONS BY MOUTH EVERY 6 HOURS AS NEEDED FOR WHEEZING OR SHORTNESS OF BREATH     No current facility-administered medications for this visit. There are no discontinued medications. [facility] follow up appointment(s):   Future Appointments   Date Time Provider Nicholas Robbins   10/16/2019  3:30 PM Artur Phillips, KATELYN [address] [facility]      Non-[facility] follow up appointment(s):   [facility]:  information provided as a resource       Goals      Prevent complications post hospitalization. 10/15/19 [facility] 10/13-10/14 dizzines/diarrhea/viral gastroenteritis  · Reviewed discharge instructions with patient  · Reviewed meds-new med is ASA 81mg qd, education provided  · Reviewed red flags: fever,nausea,diarrhea,dizziness, chest pain,sob,change in  Mentation,weakness,falling. · Given info on [facility] as resource  · Given CTN contact info if any questions/concerns.   · Scheduled MATTHEW with KATELYN Phillips for 10/16 at 3:30pm.  · Advised CTN will call back in 7-10 days for update,sooner prn.

## 2019-10-15 NOTE — Clinical Note
MATTHEW with you 10/16 3:30pm.See results of CT of head-question of 4mm aneurysm left mid cerebral artery trifurcation.

## 2019-10-16 ENCOUNTER — OFFICE VISIT (OUTPATIENT)
Dept: FAMILY MEDICINE CLINIC | Age: 59
End: 2019-10-16

## 2019-10-16 VITALS
HEART RATE: 75 BPM | BODY MASS INDEX: 34.8 KG/M2 | HEIGHT: 73 IN | OXYGEN SATURATION: 97 % | DIASTOLIC BLOOD PRESSURE: 79 MMHG | RESPIRATION RATE: 18 BRPM | TEMPERATURE: 98.4 F | SYSTOLIC BLOOD PRESSURE: 121 MMHG | WEIGHT: 262.6 LBS

## 2019-10-16 DIAGNOSIS — Z23 ENCOUNTER FOR IMMUNIZATION: ICD-10-CM

## 2019-10-16 DIAGNOSIS — H83.02 LABYRINTHITIS OF LEFT EAR: Primary | ICD-10-CM

## 2019-10-16 RX ORDER — PREDNISONE 10 MG/1
TABLET ORAL
Qty: 21 TAB | Refills: 0 | Status: SHIPPED | OUTPATIENT
Start: 2019-10-16 | End: 2020-03-10

## 2019-10-16 NOTE — PROGRESS NOTES
[facility] Note    Heydi Hodges is a 61 y.o. male who was seen in clinic today (10/16/2019). Subjective:  Hospital Follow up  Patient seen for hospital follow up for dizziness. Patient presented to [facility] ED on 10/13 for sudden onset of dizziness and unsteadiness with walking. CT head was negative for acute intracranial process but sowed questionable 4 mm aneurysm left middle cerebral artery trifurcation. MRI brain however was normal.  Echocardiogram showed LV EF 65% no regional wall motion abnormalities. Neurologist advised patient his symptoms were s/t vertigo. Patient reports improvement of symptoms. MRI Results (most recent):  Results from [place] encounter on 10/13/19   MRI BRAIN WO CONT    Narrative **PRELIMINARY REPORT**  MR of the brain demonstrates no acute abnormality  Preliminary report was provided by Dr. Moreno Zavala, the on-call radiologist, at 26  Final report to follow. **END PRELIMINARY REPORT**    **FINAL REPORT BELOW**  EXAM:  MRI BRAIN WO CONT  INDICATION:  unsteady gait, acute onset loss of balance/dizzy. TECHNIQUE:  Sagittal T1, axial FLAIR, T2,T1 and gradient echo images as well as coronal T2  weighted images and axial diffusion weighted images of the head were obtained. COMPARISON:  CTA  FINDINGS:  The ventricular size and configuration are normal.   No areas of abnormal signal in the cerebral hemispheres, brainstem or  cerebellum. No evidence of hemorrhage, mass, infarct or abnormal extra-axial fluid  collections. Flow voids are present in the vertebral basilar and carotid artery systems. The craniocervical junction is unremarkable. The structures at the cranial base including paranasal sinuses are unremarkable. Impression IMPRESSION: Normal MRI of the head. Prior to Admission medications    Medication Sig Start Date End Date Taking?  Authorizing Provider   predniSONE (STERAPRED DS) 10 mg dose pack See administration instruction per 10mg dose pack 10/16/19  Yes Aron Phillips NP   aspirin 81 mg chewable tablet Take 1 Tab by mouth daily. 10/15/19  Yes Zina Mckeon MD   bismuth subsalicylate (PEPTO-BISMOL PO) Take  by mouth as needed. Yes Provider, Historical   OTHER Take  by mouth six (6) days a week. prime secretagogue supplement   Yes Provider, Historical   acetaminophen (TYLENOL EXTRA STRENGTH) 500 mg tablet Take 500 mg by mouth every six (6) hours as needed for Pain. Yes Provider, Historical   multivitamin (ONE A DAY) tablet Take 1 Tab by mouth daily. Yes Provider, Historical   rosuvastatin (CRESTOR) 10 mg tablet TAKE 1 TABLET BY MOUTH EVERY NIGHT FOR CHOLESTEROL 10/2/19  Yes Aron Phillips NP   ubidecarenone (CO Q-10 PO) Take 1 Cap by mouth daily. Yes Provider, Historical   albuterol (PROAIR HFA) 90 mcg/actuation inhaler INHALE 2 INHALATIONS BY MOUTH EVERY 6 HOURS AS NEEDED FOR WHEEZING OR SHORTNESS OF BREATH 10/19/18  Yes Jax Posadas NP          Allergies   Allergen Reactions    Cortisone (Hydrocortisone) [Hydrocortisone] Herpetiformis Dermatitis     Reaction with eyes    Zocor [Simvastatin] Other (comments)     Bilateral leg cramps           ROS  See HPI    Objective:   Physical Exam   Constitutional: He is oriented to person, place, and time. He appears well-developed and well-nourished. Neck: Normal range of motion. Neck supple. No JVD present. Carotid bruit is not present. No thyromegaly present. Cardiovascular: Normal rate, regular rhythm and intact distal pulses. Exam reveals no gallop and no friction rub. No murmur heard. Pulmonary/Chest: Effort normal and breath sounds normal. No respiratory distress. Musculoskeletal: He exhibits no edema. Lymphadenopathy:     He has no cervical adenopathy. Neurological: He is alert and oriented to person, place, and time. Psychiatric: He has a normal mood and affect.  His behavior is normal.   Nursing note and vitals reviewed. Visit Vitals  /79 (BP 1 Location: Right arm, BP Patient Position: Sitting)   Pulse 75   Temp 98.4 °F (36.9 °C) (Oral)   Resp 18   Ht 6' 1\" (1.854 m)   Wt 262 lb 9.6 oz (119.1 kg)   SpO2 97%   BMI 34.65 kg/m²       Assessment & Plan:  Diagnoses and all orders for this visit:    1. Labyrinthitis of left ear  Begin steroid taper. Request ENT evaluation and treatment.   -     REFERRAL TO ENT-OTOLARYNGOLOGY  -     predniSONE (STERAPRED DS) 10 mg dose pack; See administration instruction per 10mg dose pack    2. Encounter for immunization  -     PNEUMOCOCCAL POLYSACCHARIDE VACCINE, 23-VALENT, ADULT OR IMMUNOSUPPRESSED PT DOSE,      I have discussed the diagnosis with the patient and the intended plan as seen in the above orders. The patient has received an after-visit summary along with patient information handout. I have discussed medication side effects and warnings with the patient as well. Follow-up and Dispositions    · Return if symptoms worsen or fail to improve.            Dioni Courser, NP

## 2019-10-16 NOTE — PATIENT INSTRUCTIONS
Labyrinthitis: Care Instructions  Your Care Instructions    Labyrinthitis (say \"lab-uh-rin-THY-tus\") is a problem deep inside your inner ear. It happens when the labyrinth gets inflamed. That's the part of your inner ear that helps control your balance. The problem may cause vertigo. Vertigo makes you feel like you're spinning or whirling. You may feel sick to your stomach or vomit. You may lose your hearing for a while. Or you may have a ringing sound in your ears. Most of the time, labyrinthitis goes away on its own. This often takes several weeks. If the problem is caused by bacteria, your doctor will give you antibiotics. But most cases are caused by a virus. A virus can't be cured with antibiotics. Your doctor may give you medicines to help control the nausea and vomiting. Follow-up care is a key part of your treatment and safety. Be sure to make and go to all appointments, and call your doctor if you are having problems. It's also a good idea to know your test results and keep a list of the medicines you take. How can you care for yourself at home? · Try bed rest and keeping your head still for the first few days you have vertigo. This may help the vertigo and reduce nausea and vomiting. · Return to your normal activities if vertigo lasts more than a few days. This may be hard, but it usually helps your brain adapt to the vertigo more quickly. As your brain adapts, vertigo will slowly go away. · Do what you can to prevent falls. For example, keep your home uncluttered, and use nonskid mats around your house and in your bath. Vertigo makes you more likely to fall. · Try balance exercises for vertigo if your doctor suggests it. An example is to stand with your feet together, arms at your sides. Hold this position for 30 seconds. · Do the Sheppard-Daroff exercise if your doctor suggests it. It may help your brain adapt to vertigo. ? Sit on the edge of your bed or sofa.   ? Quickly lie down on one side.  ? Stay in this position until the vertigo goes away or for at least 30 seconds. ? Sit up. If this causes vertigo, wait for it to stop. ? Do the exercise on the other side. ? Repeat these steps 10 times. Do the exercise 2 times a day until the vertigo is gone. · Take your medicines exactly as prescribed. Call your doctor if you think you are having a problem with your medicine. · If your doctor prescribed antibiotics, take them as directed. Do not stop taking them just because you feel better. You need to take the full course of antibiotics. When should you call for help? Call 911 anytime you think you may need emergency care. For example, call if:    · You passed out (lost consciousness).     · You have symptoms of a stroke. These may include:  ? Sudden numbness, tingling, weakness, or loss of movement in your face, arm, or leg, especially on only one side of your body. ? Sudden vision changes. ? Sudden trouble speaking. ? Sudden confusion or trouble understanding simple statements. ? Sudden problems with walking or balance. ? A sudden, severe headache that is different from past headaches.    Call your doctor now or seek immediate medical care if:    · You have new or worse dizziness.     · You notice changes in your hearing.    Watch closely for changes in your health, and be sure to contact your doctor if:    · You have new or worse nausea or vomiting.     · Your vertigo gets worse.     · Your vertigo has not gotten better in 2 weeks. Where can you learn more? Go to http://javed-shelton.info/. Enter A857 in the search box to learn more about \"Labyrinthitis: Care Instructions. \"  Current as of: October 21, 2018  Content Version: 12.2  © 2131-7992 [employer]. Care instructions adapted under license by [employer] (which disclaims liability or warranty for this information).  If you have questions about a medical condition or this instruction, always ask your healthcare professional. [place] [address] any warranty or liability for your use of this information.

## 2019-10-16 NOTE — PROGRESS NOTES
Chief Complaint   Patient presents with   [facility] Follow Up     [facility] 10/13-10/14 for dizziness, ? gastroenteritis       Reviewed Record in preparation for visit and have obtained necessary documentation. Identified pt with two pt identifiers (Name @ )    Health Maintenance Due   Topic    Pneumococcal 0-64 years (1 of 1 - PPSV23)    DTaP/Tdap/Td series (1 - Tdap)    Shingrix Vaccine Age 50> (1 of 2)         1. Have you been to the ER, urgent care clinic since your last visit? Hospitalized since your last visit? No      2. Have you seen or consulted any other health care providers outside of the [facility] [place] since your last visit? Include any pap smears or colon screening.  no

## 2019-11-12 ENCOUNTER — PATIENT OUTREACH (OUTPATIENT)
Dept: FAMILY MEDICINE CLINIC | Age: 59
End: 2019-11-12

## 2019-11-12 NOTE — PROGRESS NOTES
Called patient for update, unable to LM. Sent message via [employer] portal, requesting he call me back. Will continue to try and reach. Patient called back. Has learned to get up slowly and change positions slowly. Did not go to ENT as was advised. Suggested he see ENT to find out why-? Crystals in ear are loose. May need to be reset. Says he will have wife schedule appt. Advised to call back if needs referral and leave info on our referral line. Goals      Prevent complications post hospitalization. 10/15/19 [facility] 10/13-10/14 dizzines/diarrhea/viral gastroenteritis  · Reviewed discharge instructions with patient  · Reviewed meds-new med is ASA 81mg qd, education provided  · Reviewed red flags: fever,nausea,diarrhea,dizziness, chest pain,sob,change in  Mentation,weakness,falling. · Given info on [facility] as resource  · Given CTN contact info if any questions/concerns. · Scheduled MATTHEW with KATELYN Phillips for 10/16 at 3:30pm.  · Advised CTN will call back in 7-10 days for update,sooner prn.   11/12/19  Says he is better, has learned to get up slowly and change positions slowly. Did not go see ENT-recommended he do so to find out if ? Due to crystals in ear loose. Agreed to do so.  F/u [email or web address]

## 2020-03-10 ENCOUNTER — OFFICE VISIT (OUTPATIENT)
Dept: FAMILY MEDICINE CLINIC | Age: 60
End: 2020-03-10

## 2020-03-10 VITALS
DIASTOLIC BLOOD PRESSURE: 74 MMHG | BODY MASS INDEX: 34.72 KG/M2 | SYSTOLIC BLOOD PRESSURE: 124 MMHG | OXYGEN SATURATION: 97 % | HEART RATE: 82 BPM | TEMPERATURE: 98.2 F | HEIGHT: 73 IN | WEIGHT: 262 LBS | RESPIRATION RATE: 20 BRPM

## 2020-03-10 DIAGNOSIS — E78.00 HYPERCHOLESTEROLEMIA: Chronic | ICD-10-CM

## 2020-03-10 DIAGNOSIS — E23.6 HYPOTHALAMIC DYSFUNCTION (HCC): ICD-10-CM

## 2020-03-10 DIAGNOSIS — E66.9 OBESITY (BMI 30-39.9): ICD-10-CM

## 2020-03-10 DIAGNOSIS — R53.82 CHRONIC FATIGUE: ICD-10-CM

## 2020-03-10 DIAGNOSIS — J45.40 MODERATE PERSISTENT ASTHMA WITHOUT COMPLICATION: ICD-10-CM

## 2020-03-10 DIAGNOSIS — Z23 ENCOUNTER FOR IMMUNIZATION: ICD-10-CM

## 2020-03-10 DIAGNOSIS — Z00.00 ROUTINE GENERAL MEDICAL EXAMINATION AT A HEALTH CARE FACILITY: Primary | ICD-10-CM

## 2020-03-10 NOTE — PROGRESS NOTES
[facility] Note    Cornelio Bailey is a 61 y.o. male who was seen in clinic today (3/13/2020). Subjective:  Cardiovascular Review:  The patient has hyperlipidemia and obesity. Diet and Lifestyle: not attempting to follow a low fat, low cholesterol diet, not attempting to follow a low sodium diet, sedentary, nonsmoker  Home BP Monitoring: is not measured at home. Pertinent ROS: no TIA's, no chest pain on exertion, no dyspnea on exertion, no swelling of ankles. Patient taking Crestor without any side effects. Last colonoscopy 1/2018 with Dr. Ericka Mercedes. Repeat in 5 years. Patient has a h/o hypothalamic syndrome. Patient reports depression and fatigue. Request testing of testosterone levels. Patient also reports snoring and daytime somnolence. Prior to Admission medications    Medication Sig Start Date End Date Taking? Authorizing Provider   rosuvastatin (CRESTOR) 10 mg tablet TAKE 1 TABLET BY MOUTH EVERY NIGHT FOR CHOLESTEROL 1/28/20  Yes Katie Phillips NP   bismuth subsalicylate (PEPTO-BISMOL PO) Take  by mouth as needed. Yes Provider, Historical   OTHER Take  by mouth six (6) days a week. prime secretagogue supplement   Yes Provider, Historical   acetaminophen (TYLENOL EXTRA STRENGTH) 500 mg tablet Take 500 mg by mouth every six (6) hours as needed for Pain. Yes Provider, Historical   multivitamin (ONE A DAY) tablet Take 1 Tab by mouth daily. Yes Provider, Historical   ubidecarenone (CO Q-10 PO) Take 1 Cap by mouth daily.    Yes Provider, Historical   albuterol (PROAIR HFA) 90 mcg/actuation inhaler INHALE 2 INHALATIONS BY MOUTH EVERY 6 HOURS AS NEEDED FOR WHEEZING OR SHORTNESS OF BREATH 10/19/18  Yes Marla Keyes NP          Allergies   Allergen Reactions    Cortisone (Hydrocortisone) [Hydrocortisone] Herpetiformis Dermatitis     Reaction with eyes    Zocor [Simvastatin] Other (comments)     Bilateral leg cramps           Review of Systems Constitutional: Positive for malaise/fatigue. Negative for weight loss. HENT: Negative for hearing loss. Eyes: Negative for blurred vision. Respiratory: Negative for shortness of breath. Cardiovascular: Negative for chest pain, palpitations and leg swelling. Gastrointestinal: Negative for abdominal pain, constipation, diarrhea and heartburn. Genitourinary: Negative for frequency and urgency. Musculoskeletal: Negative for back pain, joint pain and myalgias. Neurological: Negative for dizziness, weakness and headaches. Endo/Heme/Allergies: Does not bruise/bleed easily. Psychiatric/Behavioral: Negative for depression. Objective:   Physical Exam  Vitals signs and nursing note reviewed. Constitutional:       General: He is not in acute distress. Appearance: He is well-developed. HENT:      Right Ear: Tympanic membrane and ear canal normal.      Left Ear: Tympanic membrane and ear canal normal.      Nose: No mucosal edema. Right Sinus: No maxillary sinus tenderness or frontal sinus tenderness. Left Sinus: No maxillary sinus tenderness or frontal sinus tenderness. Eyes:      Pupils: Pupils are equal, round, and reactive to light. Neck:      Musculoskeletal: Normal range of motion and neck supple. Thyroid: No thyromegaly. Vascular: No carotid bruit or JVD. Cardiovascular:      Rate and Rhythm: Normal rate and regular rhythm. Heart sounds: Normal heart sounds. No murmur. No friction rub. No gallop. Pulmonary:      Effort: Pulmonary effort is normal. No respiratory distress. Breath sounds: Normal breath sounds. No decreased breath sounds, wheezing or rhonchi. Abdominal:      General: Bowel sounds are normal. There is no distension. Palpations: Abdomen is soft. Tenderness: There is no abdominal tenderness. Lymphadenopathy:      Cervical: No cervical adenopathy.    Neurological:      Mental Status: He is alert and oriented to person, place, and time. Psychiatric:         Behavior: Behavior normal.           Visit Vitals  /74 (BP 1 Location: Left arm, BP Patient Position: Sitting)   Pulse 82   Temp 98.2 °F (36.8 °C) (Oral)   Resp 20   Ht 6' 1\" (1.854 m)   Wt 262 lb (118.8 kg)   SpO2 97%   BMI 34.57 kg/m²       Assessment & Plan:  Diagnoses and all orders for this visit:    1. Routine general medical examination at a health care facility  Well adult, reviewed routine screenings as well as healthy diet and lifestyle practices    2. Hypothalamic dysfunction ([facility] [address]  Check labs. Referral to endocrinology as needed. -     TESTOSTERONE, FREE+TOTAL  -     TSH AND FREE T4  -     PSA W/ REFLX FREE PSA  -     FSH AND LH  -     PROLACTIN    3. Chronic fatigue  As above. Needs sleep apnea testing.   -     REFERRAL TO PULMONARY DISEASE    4. Obesity (BMI 30-39. 9)  Discussed need for weight loss through diet and exercise. Reviewed decreased caloric intake and increased activity. 5. Moderate persistent asthma without complication  Well controlled, no medication changes. 6. Hypercholesterolemia  On statin, recheck labs    7. Encounter for immunization  -     TETANUS, DIPHTHERIA TOXOIDS AND ACELLULAR PERTUSSIS VACCINE (TDAP), IN INDIVIDS. >=7, IM  -     [place] IMMUNIZ ADMIN,1 SINGLE/COMB VAC/TOXOID      I have discussed the diagnosis with the patient and the intended plan as seen in the above orders. The patient has received an after-visit summary along with patient information handout. I have discussed medication side effects and warnings with the patient as well. Follow-up and Dispositions    · Return in about 3 months (around 6/10/2020) for medication management.            Claudia Bills NP

## 2020-03-10 NOTE — PATIENT INSTRUCTIONS
Hypogonadism: Care Instructions  Your Care Instructions    Men who have hypogonadism do not make enough testosterone. This hormone allows men to make sperm and to have normal physical male traits. The condition also is known as testosterone deficiency. It can lead to loss of sex drive, weakness, impotence, infertility, and weakened bones. Many things can cause this condition. Causes include injured testicles, certain medicines, an infection, and aging. Having a long-term health problem such as kidney or liver disease or being obese can cause it. So can surgery or radiation treatment for another health problem. It also can be present at birth. It is most often treated with testosterone hormone. You can get the hormone as a shot or through a patch or gel on the skin. Follow-up care is a key part of your treatment and safety. Be sure to make and go to all appointments, and call your doctor if you are having problems. It's also a good idea to know your test results and keep a list of the medicines you take. How can you care for yourself at home? · Take your medicines exactly as prescribed. Call your doctor if you think you are having a problem with your medicine. You will get more details on the specific medicines your doctor prescribes. · Follow your treatment plan. If you use testosterone hormones, follow your doctor's instructions. Hormones can help relieve many of the effects of this condition, such as impotence. But it may take weeks or months for your symptoms to improve. · Get plenty of exercise. And make sure to get plenty of calcium and vitamin D in your diet. Eat more dairy foods and green vegetables. They can help keep your bones from getting weak. · If you have a hard time dealing with this condition, talk to your doctor about joining a support group. Talking with others who have the same problems can help you cope. When should you call for help?   Call your doctor now or seek immediate medical care if:    · You have headaches.     · You have problems with your vision.    Watch closely for changes in your health, and be sure to contact your doctor if:    · You have trouble getting or keeping an erection.     · You have a loss of body hair.     · You feel weak or tired a lot of the time.     · Your breasts are getting larger.     · You do not get better as expected. Where can you learn more? Go to http://javed-shelton.info/. Enter 99 [id number] in the search box to learn more about \"Hypogonadism: Care Instructions. \"  Current as of: November 6, 2018  Content Version: 12.2  © 3451-2457 [employer]. Care instructions adapted under license by [employer] (which disclaims liability or warranty for this information). If you have questions about a medical condition or this instruction, always ask your healthcare professional. [address] any warranty or liability for your use of this information.

## 2020-03-13 LAB
FSH SERPL-ACNC: 5.9 MIU/ML (ref 1.5–12.4)
LH SERPL-ACNC: 5 MIU/ML (ref 1.7–8.6)
PROLACTIN SERPL-MCNC: 11 NG/ML (ref 4–15.2)
PSA SERPL-MCNC: 0.8 NG/ML (ref 0–4)
REFLEX CRITERIA: NORMAL
T4 FREE SERPL-MCNC: 1.19 NG/DL (ref 0.82–1.77)
TESTOST FREE SERPL-MCNC: 5 PG/ML (ref 6.6–18.1)
TESTOST SERPL-MCNC: 238.2 NG/DL (ref 264–916)
TSH SERPL DL<=0.005 MIU/L-ACNC: 1.64 UIU/ML (ref 0.45–4.5)

## 2020-03-20 NOTE — PROGRESS NOTES
Patient's thyroid levels were normal.    Testosterone level was low. I recommend beginning topical testosterone gel daily if patient agrees. Prostate level (PSA) was stable and within normal range. Recheck labs in 3 months.

## 2020-03-20 NOTE — PROGRESS NOTES
Called patient and informed of lab results. He is aware and agreeable to trying Testosterone gel. I informed him I will send message to [facility]man so he can order.

## 2020-03-23 ENCOUNTER — TELEPHONE (OUTPATIENT)
Dept: FAMILY MEDICINE CLINIC | Age: 60
End: 2020-03-23

## 2020-04-02 ENCOUNTER — PATIENT MESSAGE (OUTPATIENT)
Dept: FAMILY MEDICINE CLINIC | Age: 60
End: 2020-04-02

## 2020-04-02 DIAGNOSIS — E23.6 HYPOTHALAMIC DYSFUNCTION (HCC): ICD-10-CM

## 2020-04-02 RX ORDER — TESTOSTERONE 20.25 MG/1.25G
40.5 GEL TOPICAL DAILY
Qty: 1 BOTTLE | Refills: 2 | Status: SHIPPED | OUTPATIENT
Start: 2020-04-02 | End: 2021-11-26

## 2020-04-02 NOTE — TELEPHONE ENCOUNTER
From: Demetri Fierro  To: Aixa Birch NP  Sent: 4/2/2020 1:33 AM EDT  Subject: Prescription Question    Dear Mr. Phillips,    My insurance denied coverage for the testosterone you prescribed for me, deeming it medically unnecessary. I looked into the cost to pay without insurance, & [facility] wanted $399.00! However, using a Good Rx coupon, I can get it from [facility] for about $47.00. I have decided to transfer all of my prescriptions to the [facility] at [address] [place] [address], [address] [place] [address]. Phone #: [phone]. The [facility] Pharmacist told me since this was a new prescription, I needed to have you call in the Testosterone Rx to them. Can you cancel the Rx at [facility] and re-submit it to the [facility] noted above? Thank you!   Demetri Fierro

## 2020-04-10 ENCOUNTER — VIRTUAL VISIT (OUTPATIENT)
Dept: FAMILY MEDICINE CLINIC | Age: 60
End: 2020-04-10

## 2020-04-10 VITALS — TEMPERATURE: 98.1 F

## 2020-04-10 DIAGNOSIS — M54.50 ACUTE RIGHT-SIDED LOW BACK PAIN WITHOUT SCIATICA: Primary | ICD-10-CM

## 2020-04-10 RX ORDER — CYCLOBENZAPRINE HCL 10 MG
10 TABLET ORAL
Qty: 30 TAB | Refills: 1 | Status: SHIPPED | OUTPATIENT
Start: 2020-04-10 | End: 2022-01-12

## 2020-04-10 RX ORDER — MELOXICAM 15 MG/1
15 TABLET ORAL
Qty: 30 TAB | Refills: 0 | Status: SHIPPED | OUTPATIENT
Start: 2020-04-10 | End: 2020-05-03

## 2020-04-10 NOTE — PROGRESS NOTES
Chief Complaint   Patient presents with    Spasms    Back Pain     1. Have you been to the ER, urgent care clinic since your last visit? Hospitalized since your last visit? No    2. Have you seen or consulted any other health care providers outside of the [address] [place] [address] since your last visit? Include any pap smears or colon screening. No       Patient presents for VV for back pain spasms. Patient states the pain has caused him to call out of work. He takes tylenol arthritis at times.

## 2020-04-10 NOTE — PROGRESS NOTES
Jose Alberto Ibarra [facility]  [place], [address]  [phone]             Date of visit: 4/10/2020   Subjective:      History obtained from:  the patient. Sara Baldwin is a 61 y.o. male who presents today for   Chief Complaint   Patient presents with    Spasms    Back Pain      This service was provided through telehealth ([email or web address] real-time video/audio) due to COVID-19 pandemic, with the patient being at home and the provider being at [facility] in [place], [place]. Others assisting in the telehealth encounter included none. 10 minutes were spent with the patient by the provider. he  and/or his healthcare decision maker is aware that this patient-initiated Telehealth encounter is a billable service, with coverage as determined by his insurance carrier. he  is aware that he  may receive a bill and has provided verbal consent to proceed: Yes, per PSR    Back ache  Was really bad getting out of bed this morning  Has loosened up a little  Has had it before  Is in middle and right of low back  Saturday and  he cut all the grass and weedeated  Whenever he uses the [employer] her gets back problems  The pain really started Monday when he sneezed hard  Pain since Monday (this is 5th day)  Doesn't go down a leg  When he tries to get up will feel the pain and has a hard time getting up. Once he gets past that point gets better  Doesn't feel it sitting down. Denies tingling or numbness in legs  Using a walker and cane he keeps for when back pain flares up    Ibuprofen and tylenol no help  Took wife's meloxicam  That helped some    Has done PT for his back before    Had l-spine xray 2 years ago showin. No acute fracture in the lumbar spine. 2. Lower thoracic spine vertebral body volume loss is most likely chronic or  congenital.  3. Mild degenerative disc disease is greatest at L4-5.       Patient Active Problem List    Diagnosis Date Noted    TIA (transient ischemic attack) 10/13/2019    Moderate persistent asthma without complication 16/36/1104    Obesity (BMI 30-39.9) 12/18/2017    Incidental pulmonary nodule, > 3mm and < 8mm 07/21/2016    Hypercholesterolemia 07/12/2016    Hypothalamic dysfunction ([facility] [address] 07/12/2016     Current Outpatient Medications   Medication Sig Dispense Refill    meloxicam (MOBIC) 15 mg tablet Take 1 Tab by mouth daily as needed for Pain. 30 Tab 0    cyclobenzaprine (FLEXERIL) 10 mg tablet Take 1 Tab by mouth three (3) times daily as needed for Muscle Spasm(s). 30 Tab 1    rosuvastatin (CRESTOR) 10 mg tablet TAKE 1 TABLET BY MOUTH EVERY NIGHT FOR CHOLESTEROL 90 Tab 1    bismuth subsalicylate (PEPTO-BISMOL PO) Take  by mouth as needed.  acetaminophen (TYLENOL EXTRA STRENGTH) 500 mg tablet Take 500 mg by mouth every six (6) hours as needed for Pain.  multivitamin (ONE A DAY) tablet Take 1 Tab by mouth daily.  ubidecarenone (CO Q-10 PO) Take 1 Cap by mouth daily.  albuterol (PROAIR HFA) 90 mcg/actuation inhaler INHALE 2 INHALATIONS BY MOUTH EVERY 6 HOURS AS NEEDED FOR WHEEZING OR SHORTNESS OF BREATH 1 Inhaler 1    testosterone (ANDROGEL) 20.25 mg/1.25 gram (1.62 %) gel Apply 41 mg to affected area daily. Max Daily Amount: 41 mg. 1 Bottle 2    OTHER Take  by mouth six (6) days a week.  prime secretagogue supplement       Allergies   Allergen Reactions    Cortisone (Hydrocortisone) [Hydrocortisone] Herpetiformis Dermatitis     Reaction with eyes    Zocor [Simvastatin] Other (comments)     Bilateral leg cramps     Past Medical History:   Diagnosis Date    Anal fissure     Arthritis     Asthma     Benign positional vertigo     Hematuria of undiagnosed cause 01/2018    Hypercholesterolemia     Hypothalamic syndrome ([facility] [address] 1969    s/t TBI, treated with growth hormone    Pulmonary nodule seen on imaging study 7/12/16    4 mm bilaterally, repeat CT in 1 year     Past Surgical History:   Procedure Laterality Date    HX COLONOSCOPY  01/2018    HX ORTHOPAEDIC      bilateral hips    HX UROLOGICAL  2018    cystoscopy with benign findings     History reviewed. No pertinent family history. Social History     Tobacco Use    Smoking status: Never Smoker    Smokeless tobacco: Never Used   Substance Use Topics    Alcohol use: Yes     Alcohol/week: 0.8 standard drinks     Types: 1 Cans of beer per week      Social History     Social History Narrative    Not on file        Review of Systems  Gen: denies fever   denies urinary incontinence  GI denies bowel incontinence, nausea or vomiting       Objective:     Vitals:    04/10/20 1454   Temp: 98.1 °F (36.7 °C)     There is no height or weight on file to calculate BMI. He tried to get a BP but machine not working    General: stated age, well developed, well nourished and in NAD  Psych: alert and oriented to person, place, time and situation and Speech: appropriate quality, quantity and organization of sentences      Assessment/Plan:       ICD-10-CM ICD-9-CM    1. Acute right-sided low back pain without sciatica M54.5 724.2         Orders Placed This Encounter    meloxicam (MOBIC) 15 mg tablet    cyclobenzaprine (FLEXERIL) 10 mg tablet       Recurrent problem with back pain, spasms  Several days this time  Will try more mobic as it helped some  Also flexeril, cautioned about drowsiness  Encouraged to do some exercises at home that he learned at PT and he agreed to try, although PT didn't help much  He asked about MRI: told him not needed unless pain persistent and interfering with life enough to warrant surgery. Really more of an intermittent problem for him    Discussed the diagnosis and plan and he expressed understanding. Follow-up and Dispositions    · Return if symptoms worsen or fail to improve.          Arielle Fabian MD

## 2020-04-10 NOTE — PATIENT INSTRUCTIONS
Acute Low Back Pain: Exercises Introduction Here are some examples of typical rehabilitation exercises for your condition. Start each exercise slowly. Ease off the exercise if you start to have pain. Your doctor or physical therapist will tell you when you can start these exercises and which ones will work best for you. When you are not being active, find a comfortable position for rest. Some people are comfortable on the floor or a medium-firm bed with a small pillow under their head and another under their knees. Some people prefer to lie on their side with a pillow between their knees. Don't stay in one position for too long. Take short walks (10 to 20 minutes) every 2 to 3 hours. Avoid slopes, hills, and stairs until you feel better. Walk only distances you can manage without pain, especially leg pain. How to do the exercises Back stretches 1. Get down on your hands and knees on the floor. 2. Relax your head and allow it to droop. Round your back up toward the ceiling until you feel a nice stretch in your upper, middle, and lower back. Hold this stretch for as long as it feels comfortable, or about 15 to 30 seconds. 3. Return to the starting position with a flat back while you are on your hands and knees. 4. Let your back sway by pressing your stomach toward the floor. Lift your buttocks toward the ceiling. 5. Hold this position for 15 to 30 seconds. 6. Repeat 2 to 4 times. Follow-up care is a key part of your treatment and safety. Be sure to make and go to all appointments, and call your doctor if you are having problems. It's also a good idea to know your test results and keep a list of the medicines you take. Where can you learn more? Go to http://javed-shelton.info/ Enter W507 in the search box to learn more about \"Acute Low Back Pain: Exercises. \" Current as of: June 26, 2019Content Version: 12.4 © 9308-3288 [employer]wise[employer]. Care instructions adapted under license by [employer] (which disclaims liability or warranty for this information). If you have questions about a medical condition or this instruction, always ask your healthcare professional. [address] any warranty or liability for your use of this information.

## 2020-04-10 NOTE — LETTER
NOTIFICATION RETURN TO WORK / SCHOOL 
 
4/10/2020 3:08 PM 
 
Mr. Jumana [address] [place] [address] To Whom It May Concern: 
 
Jumana Thompson is currently under the care of [facility]. He has been out of work for injury (not work-related) since 4/9/20. He will return to work/school on: 4/14/20. If there are questions or concerns please have the patient contact our office. Sincerely, Serena Moctezuma MD

## 2020-05-03 RX ORDER — MELOXICAM 15 MG/1
TABLET ORAL
Qty: 30 TAB | Refills: 0 | Status: SHIPPED | OUTPATIENT
Start: 2020-05-03 | End: 2020-05-31

## 2020-05-07 NOTE — TELEPHONE ENCOUNTER
Prior authorization denied for testosterone gel . Reviewed patient note requesting change of pharmacy from [facility] to [facility] ( will be getting testosterone gel with [facility] coupon ).   Dennie Silvan, LPN

## 2020-06-15 ENCOUNTER — PATIENT MESSAGE (OUTPATIENT)
Dept: FAMILY MEDICINE CLINIC | Age: 60
End: 2020-06-15

## 2020-06-15 DIAGNOSIS — Z20.822 EXPOSURE TO COVID-19 VIRUS: Primary | ICD-10-CM

## 2020-06-15 NOTE — LETTER
NOTIFICATION TO WORK / SCHOOL 
 
6/24/2020 10:22 PM 
 
Mr. Kerrie Armenta [address] [place] [address] [id number] To Whom It May Concern: 
 
Kerrie Armenta is currently under the care of [facility]. He suffers from asthma and is unable to wear a mask. Please make accommodations given his underlying health issues. If there are questions or concerns please have the patient contact our office. Sincerely, Yvonne Hill NP

## 2020-06-25 NOTE — TELEPHONE ENCOUNTER
From: Za Higgins  To: Shoaib Campbell NP  Sent: 6/15/2020 6:08 PM EDT  Subject: Non-Urgent Medical Question    Dear Merissa Galeana,    My place of work is requiring that I have a letter from you stating that my chronic breathing difficulties (asthma) make it too difficult for me to breathe while wearing a mask. Would it be possible for you to generate such a letter? I have tried keeping a mask on while at work, but can't get enough air when it's on my face. I'm having to go to my office to take it off so I can catch my breath. I believe I already had COVID-19 earlier this winter, but never got tested, and just treated it at home with OTC meds. I have no symptoms at present. I am looking for a place to get antibody testing as soon as possible. Please let me know if there is any additional information you need from me. Thanks for your help!     Lloyd Kuhn

## 2020-10-02 RX ORDER — FERROUS SULFATE 325(65) MG
325 TABLET, DELAYED RELEASE (ENTERIC COATED) ORAL
Qty: 30 TAB | Refills: 2 | Status: SHIPPED | OUTPATIENT
Start: 2020-10-02 | End: 2020-12-15

## 2020-12-15 RX ORDER — FERROUS SULFATE 325(65) MG
TABLET, DELAYED RELEASE (ENTERIC COATED) ORAL
Qty: 30 TAB | Refills: 2 | Status: SHIPPED | OUTPATIENT
Start: 2020-12-15 | End: 2021-03-22 | Stop reason: SDUPTHER

## 2021-01-07 DIAGNOSIS — E78.00 HYPERCHOLESTEROLEMIA: Chronic | ICD-10-CM

## 2021-01-07 NOTE — TELEPHONE ENCOUNTER
Last Visit: VV 4/10/20 MD Butts    LOV 3/10/20 KATELYN Phillips  Next Appointment: Not scheduled  Previous Refill Encounter(s): 7/27/20 90 + 1    Requested Prescriptions     Pending Prescriptions Disp Refills    rosuvastatin (CRESTOR) 10 mg tablet 90 Tab 1     Sig: Take 1 Tab by mouth nightly.  For cholesterol

## 2021-01-08 RX ORDER — ROSUVASTATIN CALCIUM 10 MG/1
10 TABLET, COATED ORAL
Qty: 90 TAB | Refills: 1 | Status: SHIPPED | OUTPATIENT
Start: 2021-01-08 | End: 2021-07-07

## 2021-03-22 NOTE — TELEPHONE ENCOUNTER
Last Visit: VV 4/10/20 MD Butts  Next Appointment: Not scheduled  Previous Refill Encounter(s): 12/15/20 30 + 2    Requested Prescriptions     Pending Prescriptions Disp Refills   • ferrous sulfate (IRON) 325 mg (65 mg iron) EC tablet 30 Tab 2     Sig: Take 1 Tab by mouth Daily (before breakfast).

## 2021-03-23 RX ORDER — FERROUS SULFATE 325(65) MG
325 TABLET, DELAYED RELEASE (ENTERIC COATED) ORAL
Qty: 30 TAB | Refills: 0 | Status: SHIPPED | OUTPATIENT
Start: 2021-03-23 | End: 2021-04-22

## 2021-04-22 RX ORDER — FERROUS SULFATE 325(65) MG
TABLET, DELAYED RELEASE (ENTERIC COATED) ORAL
Qty: 30 TAB | Refills: 0 | Status: SHIPPED | OUTPATIENT
Start: 2021-04-22 | End: 2021-05-26

## 2021-05-26 RX ORDER — FERROUS SULFATE 325(65) MG
TABLET, DELAYED RELEASE (ENTERIC COATED) ORAL
Qty: 30 TABLET | Refills: 0 | Status: SHIPPED | OUTPATIENT
Start: 2021-05-26 | End: 2021-06-19

## 2021-06-19 RX ORDER — FERROUS SULFATE 325(65) MG
TABLET, DELAYED RELEASE (ENTERIC COATED) ORAL
Qty: 30 TABLET | Refills: 0 | Status: SHIPPED | OUTPATIENT
Start: 2021-06-19 | End: 2021-07-22

## 2021-07-06 DIAGNOSIS — E78.00 HYPERCHOLESTEROLEMIA: Chronic | ICD-10-CM

## 2021-07-07 RX ORDER — ROSUVASTATIN CALCIUM 10 MG/1
10 TABLET, COATED ORAL
Qty: 30 TABLET | Refills: 0 | Status: SHIPPED | OUTPATIENT
Start: 2021-07-07 | End: 2021-08-02

## 2021-07-22 RX ORDER — FERROUS SULFATE 325(65) MG
TABLET, DELAYED RELEASE (ENTERIC COATED) ORAL
Qty: 30 TABLET | Refills: 0 | Status: SHIPPED | OUTPATIENT
Start: 2021-07-22 | End: 2021-09-06

## 2021-07-30 DIAGNOSIS — E78.00 HYPERCHOLESTEROLEMIA: Chronic | ICD-10-CM

## 2021-08-02 RX ORDER — ROSUVASTATIN CALCIUM 10 MG/1
TABLET, COATED ORAL
Qty: 30 TABLET | Refills: 0 | Status: SHIPPED | OUTPATIENT
Start: 2021-08-02 | End: 2021-10-02

## 2021-09-06 RX ORDER — FERROUS SULFATE 325(65) MG
TABLET, DELAYED RELEASE (ENTERIC COATED) ORAL
Qty: 30 TABLET | Refills: 0 | Status: SHIPPED | OUTPATIENT
Start: 2021-09-06 | End: 2021-09-30

## 2021-09-30 RX ORDER — FERROUS SULFATE 325(65) MG
TABLET, DELAYED RELEASE (ENTERIC COATED) ORAL
Qty: 30 TABLET | Refills: 0 | Status: SHIPPED | OUTPATIENT
Start: 2021-09-30 | End: 2021-10-26

## 2021-10-02 DIAGNOSIS — E78.00 HYPERCHOLESTEROLEMIA: Chronic | ICD-10-CM

## 2021-10-02 RX ORDER — ROSUVASTATIN CALCIUM 10 MG/1
TABLET, COATED ORAL
Qty: 30 TABLET | Refills: 0 | Status: SHIPPED | OUTPATIENT
Start: 2021-10-02 | End: 2021-10-26

## 2021-10-26 DIAGNOSIS — E78.00 HYPERCHOLESTEROLEMIA: Chronic | ICD-10-CM

## 2021-10-26 RX ORDER — ROSUVASTATIN CALCIUM 10 MG/1
TABLET, COATED ORAL
Qty: 30 TABLET | Refills: 0 | Status: SHIPPED | OUTPATIENT
Start: 2021-10-26 | End: 2021-11-27

## 2021-10-26 RX ORDER — FERROUS SULFATE 325(65) MG
TABLET, DELAYED RELEASE (ENTERIC COATED) ORAL
Qty: 30 TABLET | Refills: 0 | Status: SHIPPED | OUTPATIENT
Start: 2021-10-26 | End: 2021-11-27

## 2021-11-26 ENCOUNTER — OFFICE VISIT (OUTPATIENT)
Dept: FAMILY MEDICINE CLINIC | Age: 61
End: 2021-11-26
Payer: COMMERCIAL

## 2021-11-26 VITALS
BODY MASS INDEX: 35.31 KG/M2 | WEIGHT: 266.4 LBS | HEIGHT: 73 IN | RESPIRATION RATE: 16 BRPM | SYSTOLIC BLOOD PRESSURE: 118 MMHG | OXYGEN SATURATION: 95 % | TEMPERATURE: 98.5 F | HEART RATE: 67 BPM | DIASTOLIC BLOOD PRESSURE: 73 MMHG

## 2021-11-26 DIAGNOSIS — E78.00 HYPERCHOLESTEROLEMIA: Chronic | ICD-10-CM

## 2021-11-26 DIAGNOSIS — E23.6 HYPOTHALAMIC DYSFUNCTION (HCC): ICD-10-CM

## 2021-11-26 DIAGNOSIS — E55.9 HYPOVITAMINOSIS D: ICD-10-CM

## 2021-11-26 DIAGNOSIS — R05.9 COMPLAINING OF COUGH: Primary | ICD-10-CM

## 2021-11-26 DIAGNOSIS — D50.9 IRON DEFICIENCY ANEMIA, UNSPECIFIED IRON DEFICIENCY ANEMIA TYPE: ICD-10-CM

## 2021-11-26 DIAGNOSIS — E78.00 HYPERCHOLESTEROLEMIA: ICD-10-CM

## 2021-11-26 LAB
25(OH)D3 SERPL-MCNC: 94.1 NG/ML (ref 30–100)
ALBUMIN SERPL-MCNC: 3.7 G/DL (ref 3.5–5)
ALBUMIN/GLOB SERPL: 1 {RATIO} (ref 1.1–2.2)
ALP SERPL-CCNC: 86 U/L (ref 45–117)
ALT SERPL-CCNC: 35 U/L (ref 12–78)
ANION GAP SERPL CALC-SCNC: 5 MMOL/L (ref 5–15)
AST SERPL-CCNC: 18 U/L (ref 15–37)
BASOPHILS # BLD: 0 K/UL (ref 0–0.1)
BASOPHILS NFR BLD: 0 % (ref 0–1)
BILIRUB SERPL-MCNC: 0.4 MG/DL (ref 0.2–1)
BUN SERPL-MCNC: 14 MG/DL (ref 6–20)
BUN/CREAT SERPL: 12 (ref 12–20)
CALCIUM SERPL-MCNC: 9.1 MG/DL (ref 8.5–10.1)
CHLORIDE SERPL-SCNC: 105 MMOL/L (ref 97–108)
CHOLEST SERPL-MCNC: 129 MG/DL
CO2 SERPL-SCNC: 28 MMOL/L (ref 21–32)
CREAT SERPL-MCNC: 1.16 MG/DL (ref 0.7–1.3)
DIFFERENTIAL METHOD BLD: ABNORMAL
EOSINOPHIL # BLD: 0.3 K/UL (ref 0–0.4)
EOSINOPHIL NFR BLD: 4 % (ref 0–7)
ERYTHROCYTE [DISTWIDTH] IN BLOOD BY AUTOMATED COUNT: 12.6 % (ref 11.5–14.5)
EST. AVERAGE GLUCOSE BLD GHB EST-MCNC: 111 MG/DL
GLOBULIN SER CALC-MCNC: 3.7 G/DL (ref 2–4)
GLUCOSE SERPL-MCNC: 84 MG/DL (ref 65–100)
HBA1C MFR BLD: 5.5 % (ref 4–5.6)
HCT VFR BLD AUTO: 41.2 % (ref 36.6–50.3)
HDLC SERPL-MCNC: 46 MG/DL
HDLC SERPL: 2.8 {RATIO} (ref 0–5)
HGB BLD-MCNC: 13.5 G/DL (ref 12.1–17)
IMM GRANULOCYTES # BLD AUTO: 0 K/UL (ref 0–0.04)
IMM GRANULOCYTES NFR BLD AUTO: 0 % (ref 0–0.5)
IRON SATN MFR SERPL: 27 % (ref 20–50)
IRON SERPL-MCNC: 69 UG/DL (ref 35–150)
LDLC SERPL CALC-MCNC: 60 MG/DL (ref 0–100)
LYMPHOCYTES # BLD: 2.1 K/UL (ref 0.8–3.5)
LYMPHOCYTES NFR BLD: 23 % (ref 12–49)
MCH RBC QN AUTO: 32.8 PG (ref 26–34)
MCHC RBC AUTO-ENTMCNC: 32.8 G/DL (ref 30–36.5)
MCV RBC AUTO: 100.2 FL (ref 80–99)
MONOCYTES # BLD: 0.5 K/UL (ref 0–1)
MONOCYTES NFR BLD: 5 % (ref 5–13)
NEUTS SEG # BLD: 6.2 K/UL (ref 1.8–8)
NEUTS SEG NFR BLD: 68 % (ref 32–75)
NRBC # BLD: 0 K/UL (ref 0–0.01)
NRBC BLD-RTO: 0 PER 100 WBC
PLATELET # BLD AUTO: 260 K/UL (ref 150–400)
PMV BLD AUTO: 10 FL (ref 8.9–12.9)
POTASSIUM SERPL-SCNC: 4.8 MMOL/L (ref 3.5–5.1)
PROT SERPL-MCNC: 7.4 G/DL (ref 6.4–8.2)
RBC # BLD AUTO: 4.11 M/UL (ref 4.1–5.7)
SODIUM SERPL-SCNC: 138 MMOL/L (ref 136–145)
TIBC SERPL-MCNC: 255 UG/DL (ref 250–450)
TRIGL SERPL-MCNC: 115 MG/DL (ref ?–150)
VLDLC SERPL CALC-MCNC: 23 MG/DL
WBC # BLD AUTO: 9.2 K/UL (ref 4.1–11.1)

## 2021-11-26 PROCEDURE — 99212 OFFICE O/P EST SF 10 MIN: CPT | Performed by: NURSE PRACTITIONER

## 2021-11-26 RX ORDER — PNV NO.95/FERROUS FUM/FOLIC AC 28MG-0.8MG
TABLET ORAL
COMMUNITY
Start: 2018-01-01

## 2021-11-26 RX ORDER — MAGNESIUM GLYCINATE 100 MG
CAPSULE ORAL
COMMUNITY
Start: 2021-06-01

## 2021-11-26 NOTE — PROGRESS NOTES
[facility] Note     Kyle Bragg (: 1960) is a 64 y.o. male, established patient, here for evaluation of the following chief complaint(s):  Physical       ASSESSMENT/PLAN:  1. Complaining of cough  - Discussed symptom management  - Continue Delsym prn and Mucinex  - Increase po fluid intake  - May use warm salt water gargles or lozenges for throat discomfort  - No antibiotics indicated  - Should symptoms become worse or develops fever he should contact office    2. Hypothalamic dysfunction (HCC)  -     METABOLIC PANEL, COMPREHENSIVE; Future  -     HEMOGLOBIN A1C WITH EAG; Future  -     IRON PROFILE; Future    3. Hypovitaminosis D  -     VITAMIN D, 25 HYDROXY; Future    4. Iron deficiency anemia, unspecified iron deficiency anemia type  -     CBC WITH AUTOMATED DIFF; Future  -     IRON PROFILE; Future    5. Hypercholesterolemia  -     METABOLIC PANEL, COMPREHENSIVE; Future  -     LIPID PANEL; Future    Note - patient to reschedule complete physical with male provider as that is his preference. Labs will be collected today. Return in about 1 month (around 2021), or if symptoms worsen or fail to improve. SUBJECTIVE/OBJECTIVE:    Kyle Bragg is a 64 y.o. male seen today for cough. He was initially scheduled for a complete physical. This was deferred due to cough concern and that he would prefer a male provider. Mr. Cesar Gotti reports approximately 10 days ago he was blowing leavings in his yard without a mask. He subsequently developed nasal and chest congestion. He used albuterol inhaler for the first 2-3 days which helped. The cough was worse when laying flat. He has been using Delsym and Mucinex. The cough is present but Mr. Edith Dumont noted that in the last 3-4 days it has improved. Denies sick contacts. Review of Systems   Constitutional: Negative. HENT: Positive for congestion, rhinorrhea, sinus pressure and sore throat.  Negative for facial swelling, mouth sores, trouble swallowing and voice change. Eyes: Negative. Respiratory: Positive for cough. Negative for chest tightness and shortness of breath. Cardiovascular: Negative. Gastrointestinal: Negative. Genitourinary: Negative. Musculoskeletal: Negative. Neurological: Negative. Psychiatric/Behavioral: Negative. Wt Readings from Last 3 Encounters:   11/26/21 266 lb 6.4 oz (120.8 kg)   03/10/20 262 lb (118.8 kg)   10/16/19 262 lb 9.6 oz (119.1 kg)     Temp Readings from Last 3 Encounters:   11/26/21 98.5 °F (36.9 °C) (Temporal)   04/10/20 98.1 °F (36.7 °C)   03/10/20 98.2 °F (36.8 °C) (Oral)     BP Readings from Last 3 Encounters:   11/26/21 118/73   03/10/20 124/74   10/16/19 121/79     Pulse Readings from Last 3 Encounters:   11/26/21 67   03/10/20 82   10/16/19 75           PHYSICAL EXAMINATION:       General: Alert, cooperative, no distress  male  Eyes: Conjunctivae clear. Pupils equally round and reactive to light, Extraocular muscles intact. Ears: Normal external ear canals both ears. TM's visualized with slight serous effusion bilaterally. Nose: Nares normal. Septum midline. Mucosa normal. No drainage or sinus tenderness. Mouth/Throat: Lips, mucosa, and tongue normal. No oropharyngeal erythema. No tonsillar enlargement or exudate. Neck: Supple, symmetrical, trachea midline, no adenopathy. No thyroid enlargement/tenderness/nodules  Respiratory: Breathing comfortably, in no acute respiratory distress. Clear to auscultation bilaterally. Normal inspiratory and expiratory ratio. Cardiovascular: Regular rate and rhythm, S1, S2 normal, no murmur, click, rub or gallop. Extremities: no edema. Pulses 2+ and symmetric radial and dorsalis pedis   MSK: Extremities normal appearing, atraumatic, no effusion. Gait steady and unassisted. Skin: Skin color, texture, turgor normal. No rashes or lesions on exposed skin.   Lymph nodes: Cervical, supraclavicular nodes normal.  Neurologic: A/Ox3. Strength 5/5 grossly. Psychiatric: Normal affect. Mood euthymic. Thoughts logical. Speech volume and speed normal            Treatment risks/benefits/costs/interactions/alternatives discussed with patient. Advised patient to call back or return to office if symptoms worsen/change/persist. If patient cannot reach us or should anything more severe/urgent arise he/she should proceed directly to the nearest emergency department. Discussed expected course/resolution/complications of diagnosis in detail with patient. Patient expressed understanding with the diagnosis and plan. An electronic signature was used to authenticate this note.   -- Sherrie Pollock NP

## 2021-11-26 NOTE — PROGRESS NOTES
Chief Complaint   Patient presents with    Physical         1. \"Have you been to the ER, urgent care clinic since your last visit? Hospitalized since your last visit? \" No    2. \"Have you seen or consulted any other health care providers outside of the [address] [place] [address] since your last visit? \" Yes When: 10/21 Where: podiatrist Reason for visit: follow up on neuropathy     3. For patients over 45: Has the patient had a colonoscopy?  Yes, HM satisfied with blue hyperlink     Pt declined flu shot      3 most recent PHQ Screens 11/26/2021   Little interest or pleasure in doing things Not at all   Feeling down, depressed, irritable, or hopeless Not at all   Total Score PHQ 2 0       Health Maintenance Due   Topic Date Due    COVID-19 Vaccine (1) Never done    Shingrix Vaccine Age 50> (1 of 2) Never done    Lipid Screen  10/13/2020    Flu Vaccine (1) 09/01/2021

## 2021-11-26 NOTE — PATIENT INSTRUCTIONS
Cough: Care Instructions  Your Care Instructions     A cough is your body's response to something that bothers your throat or airways. Many things can cause a cough. You might cough because of a cold or the flu, bronchitis, or asthma. Smoking, postnasal drip, allergies, and stomach acid that backs up into your throat also can cause coughs. A cough is a symptom, not a disease. Most coughs stop when the cause, such as a cold, goes away. You can take a few steps at home to cough less and feel better. Follow-up care is a key part of your treatment and safety. Be sure to make and go to all appointments, and call your doctor if you are having problems. It's also a good idea to know your test results and keep a list of the medicines you take. How can you care for yourself at home? · Drink lots of water and other fluids. This helps thin the mucus and soothes a dry or sore throat. Honey or lemon juice in hot water or tea may ease a dry cough. · Take cough medicine as directed by your doctor. · Prop up your head on pillows to help you breathe and ease a dry cough. · Try cough drops to soothe a dry or sore throat. Cough drops don't stop a cough. Medicine-flavored cough drops are no better than candy-flavored drops or hard candy. · Do not smoke. Avoid secondhand smoke. If you need help quitting, talk to your doctor about stop-smoking programs and medicines. These can increase your chances of quitting for good. When should you call for help? Call 911 anytime you think you may need emergency care. For example, call if:    · You have severe trouble breathing. Call your doctor now or seek immediate medical care if:    · You cough up blood.     · You have new or worse trouble breathing.     · You have a new or higher fever.     · You have a new rash.    Watch closely for changes in your health, and be sure to contact your doctor if:    · You cough more deeply or more often, especially if you notice more mucus or a change in the color of your mucus.     · You have new symptoms, such as a sore throat, an earache, or sinus pain.     · You do not get better as expected. Where can you learn more? Go to http://www.gray.com/  Enter D279 in the search box to learn more about \"Cough: Care Instructions. \"  Current as of: July 6, 2021               Content Version: 13.0  © 2006-2021 [employer]. Care instructions adapted under license by [employer] (which disclaims liability or warranty for this information). If you have questions about a medical condition or this instruction, always ask your healthcare professional. [address] any warranty or liability for your use of this information.

## 2021-11-27 RX ORDER — FERROUS SULFATE 325(65) MG
TABLET, DELAYED RELEASE (ENTERIC COATED) ORAL
Qty: 30 TABLET | Refills: 0 | Status: SHIPPED | OUTPATIENT
Start: 2021-11-27 | End: 2021-12-21

## 2021-11-27 RX ORDER — ROSUVASTATIN CALCIUM 10 MG/1
TABLET, COATED ORAL
Qty: 30 TABLET | Refills: 0 | Status: SHIPPED | OUTPATIENT
Start: 2021-11-27 | End: 2021-12-21

## 2021-12-21 DIAGNOSIS — E78.00 HYPERCHOLESTEROLEMIA: Chronic | ICD-10-CM

## 2021-12-21 RX ORDER — ROSUVASTATIN CALCIUM 10 MG/1
TABLET, COATED ORAL
Qty: 30 TABLET | Refills: 0 | Status: SHIPPED | OUTPATIENT
Start: 2021-12-21 | End: 2022-01-26 | Stop reason: SDUPTHER

## 2021-12-21 RX ORDER — FERROUS SULFATE 325(65) MG
TABLET, DELAYED RELEASE (ENTERIC COATED) ORAL
Qty: 30 TABLET | Refills: 0 | Status: SHIPPED | OUTPATIENT
Start: 2021-12-21 | End: 2022-01-26 | Stop reason: SDUPTHER

## 2022-01-12 ENCOUNTER — OFFICE VISIT (OUTPATIENT)
Dept: FAMILY MEDICINE CLINIC | Age: 62
End: 2022-01-12
Payer: COMMERCIAL

## 2022-01-12 VITALS
TEMPERATURE: 98 F | RESPIRATION RATE: 16 BRPM | DIASTOLIC BLOOD PRESSURE: 70 MMHG | BODY MASS INDEX: 35.65 KG/M2 | OXYGEN SATURATION: 96 % | HEIGHT: 73 IN | SYSTOLIC BLOOD PRESSURE: 108 MMHG | WEIGHT: 269 LBS | HEART RATE: 76 BPM

## 2022-01-12 DIAGNOSIS — R25.2 LEG CRAMPS: ICD-10-CM

## 2022-01-12 DIAGNOSIS — Z12.5 SCREENING FOR MALIGNANT NEOPLASM OF PROSTATE: ICD-10-CM

## 2022-01-12 DIAGNOSIS — Z00.00 ANNUAL PHYSICAL EXAM: Primary | ICD-10-CM

## 2022-01-12 DIAGNOSIS — G62.9 NEUROPATHY: ICD-10-CM

## 2022-01-12 DIAGNOSIS — J45.20 MILD INTERMITTENT OCCUPATIONAL ASTHMA WITHOUT COMPLICATION: ICD-10-CM

## 2022-01-12 DIAGNOSIS — Z99.89 OSA ON CPAP: ICD-10-CM

## 2022-01-12 DIAGNOSIS — R53.82 CHRONIC FATIGUE: ICD-10-CM

## 2022-01-12 DIAGNOSIS — E78.00 HYPERCHOLESTEROLEMIA: ICD-10-CM

## 2022-01-12 DIAGNOSIS — G47.33 OSA ON CPAP: ICD-10-CM

## 2022-01-12 DIAGNOSIS — E67.3 HYPERVITAMINOSIS D: ICD-10-CM

## 2022-01-12 DIAGNOSIS — E66.9 OBESITY (BMI 30-39.9): ICD-10-CM

## 2022-01-12 DIAGNOSIS — E23.6 HYPOTHALAMIC DYSFUNCTION (HCC): ICD-10-CM

## 2022-01-12 DIAGNOSIS — Z57.9 MILD INTERMITTENT OCCUPATIONAL ASTHMA WITHOUT COMPLICATION: ICD-10-CM

## 2022-01-12 PROCEDURE — 99396 PREV VISIT EST AGE 40-64: CPT | Performed by: FAMILY MEDICINE

## 2022-01-12 SDOH — HEALTH STABILITY - PHYSICAL HEALTH: OCCUPATIONAL EXPOSURE TO UNSPECIFIED RISK FACTOR: Z57.9

## 2022-01-12 NOTE — PROGRESS NOTES
Ria Hancock  58 y.o. male  1960  [facility] [address] [id number]  [phone]     [address]       Encounter Date: 1/12/2022           Established Patient Visit Note: Juana Mario MD    Reason for Appointment:  Chief Complaint   Patient presents with    Complete Physical         History of Present Illness:  History provided by patient    Ria Hancock is a 58 y.o. male who presents to clinic today for:     Annual Physical    · Neuropathy: followed by podiatry  · Leg Cramps: he reports as improved since correcting vitamin d  · Vitamin D Deficiency: getting 50k units of Vit D weekly plus 10K daily of Vit D. 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 He reports that this was not addressed at endocrinology visit. · Fatigue: Duration: Jan, 2020; he believes it to be related to an undiagnosed case of COVID in Jan, 2020. His wife reports that he had testosterone evaluated with endocrinology who did not identify a problem with this. He reports that his energy has been a little better recently. · FINESSE on CPAP: managed by PAR; he reports good compliance with CPAP  · HLD: tolerating Crestor with CoQ10 and magnesium  · Occupational Asthma: his wife reports as related to freon; followed by pulmonology  · Cough: he reports that this has resolved. · Obesity: He has gained 3 lbs since last visit; he stays active with work            Review of Systems  All other ROS were reviewed and are negative except as discussed in HPI        Allergies: Cortisone (hydrocortisone) [hydrocortisone] and Zocor [simvastatin]    Medications:     Current Outpatient Medications:     rosuvastatin (CRESTOR) 10 mg tablet, TAKE 1 TAB BY MOUTH NIGHTLY.  FOR CHOLESTEROL *NEED APPT, Disp: 30 Tablet, Rfl: 0    ferrous sulfate (IRON) 325 mg (65 mg iron) EC tablet, TAKE 1 TABLET BY MOUTH EVERY DAY BEFORE BREAKFAST, Disp: 30 Tablet, Rfl: 0    vitamin B complex (B COMPLEX 1 PO), , Disp: , Rfl:     multivitamin pwpk, , Disp: , Rfl:     magnesium glycinate-mag oxide 120 mg magnesium cap, , Disp: , Rfl:     multivitamin with minerals (ONE-A-DAY 50 PLUS PO), Take 1 Tablet by mouth daily. , Disp: , Rfl:     QUERCETIN DIHYDRATE, BULK,, , Disp: , Rfl:     acetylcysteine (N-ACETYL-L-CYSTEINE), , Disp: , Rfl:     ubidecarenone/vitamin E mixed (COQ10  PO), , Disp: , Rfl:     levonorgestrel (OPCICON ONE-STEP PO), , Disp: , Rfl:     calcium carb-vitamin D3-vit K2 (Calcium Plus MenaQ7 Adult) 500 mg calcium- 200 unit-90 mcg tab, , Disp: , Rfl:     BORON, BULK,, , Disp: , Rfl:     bismuth subsalicylate (PEPTO-BISMOL PO), Take  by mouth as needed. , Disp: , Rfl:     OTHER, Take  by mouth six (6) days a week. prime secretagogue supplement, Disp: , Rfl:     albuterol (PROAIR HFA) 90 mcg/actuation inhaler, INHALE 2 INHALATIONS BY MOUTH EVERY 6 HOURS AS NEEDED FOR WHEEZING OR SHORTNESS OF BREATH, Disp: 1 Inhaler, Rfl: 1    History  Patient Care Team:  Arnie Dakin, MD as PCP - General (Family Medicine)  Arnie Dakin, MD as PCP - [facility] Provider  Hilary Goetz MD (Pulmonary Disease)    Past Medical History: he has a past medical history of Anal fissure, Arthritis, Asthma, Benign positional vertigo, Hematuria of undiagnosed cause (01/2018), Hypercholesterolemia, Hypothalamic syndrome ([facility] [address] (1969), Neuropathy, Pulmonary nodule seen on imaging study (7/12/16), and Vitamin D deficiency. Past Surgical History: he has a past surgical history that includes hx orthopaedic; hx colonoscopy (01/2018); and hx urological (2018). Family Medical History: family history is not on file. Social History: he reports that he has never smoked.  He has never used smokeless tobacco. He reports current alcohol use of about 0.8 standard drinks of alcohol per week. He reports that he does not use drugs. Objective:   Visit Vitals  /70   Pulse 76   Temp 98 °F (36.7 °C)   Resp 16   Ht 6' 1\" (1.854 m)   Wt 269 lb (122 kg)   SpO2 96%   BMI 35.49 kg/m²     Wt Readings from Last 3 Encounters:   01/12/22 269 lb (122 kg)   11/26/21 266 lb 6.4 oz (120.8 kg)   03/10/20 262 lb (118.8 kg)       Physical Exam  HENT:      Head: Normocephalic and atraumatic. Right Ear: External ear normal.      Left Ear: External ear normal.      Nose: Nose normal.      Mouth/Throat:      Mouth: Mucous membranes are moist.      Pharynx: No oropharyngeal exudate. Eyes:      General: Lids are normal. No scleral icterus. Right eye: No discharge. Left eye: No discharge. Extraocular Movements: Extraocular movements intact. Conjunctiva/sclera: Conjunctivae normal.      Pupils: Pupils are equal, round, and reactive to light. Neck:      Thyroid: No thyromegaly. Vascular: No JVD. Trachea: No tracheal deviation. Cardiovascular:      Rate and Rhythm: Normal rate and regular rhythm. Pulses:           Radial pulses are 2+ on the right side and 2+ on the left side. Heart sounds: Normal heart sounds. No murmur heard. No friction rub. No gallop. Pulmonary:      Effort: Pulmonary effort is normal. No respiratory distress. Breath sounds: Normal breath sounds. No stridor. No wheezing. Abdominal:      General: Bowel sounds are normal. There is no distension. Palpations: Abdomen is soft. There is no mass. Tenderness: There is no abdominal tenderness. There is no guarding or rebound. Musculoskeletal:         General: No tenderness or deformity. Normal range of motion. Cervical back: Normal range of motion and neck supple. Right lower leg: No edema. Left lower leg: No edema. Lymphadenopathy:      Cervical: No cervical adenopathy. Skin:     General: Skin is warm and dry.    Neurological:      Mental Status: He is alert.      Cranial Nerves: No cranial nerve deficit. Coordination: Coordination normal.      Gait: Gait is intact. Deep Tendon Reflexes: Reflexes normal.   Psychiatric:         Mood and Affect: Mood normal.         Behavior: Behavior normal.         Thought Content: Thought content normal.         Assessment & Plan:      ICD-10-CM ICD-9-CM    1. Annual physical exam  Z00.00 V70.0    2. Screening for malignant neoplasm of prostate  Z12.5 V76.44 PSA SCREENING (SCREENING)      PSA SCREENING (SCREENING)   3. Hypervitaminosis D  E67.3 278.4 VITAMIN D, 25 HYDROXY      VITAMIN D, 25 HYDROXY   4. TIA (transient ischemic attack)  G45.9 435.9    5. Incidental pulmonary nodule, > 3mm and < 8mm  R91.1 793.11       Annual Physical Exam: Reviewed and addressed patient's medical history and concerns as discussed in note. Reviewed recommended screenings and immunizations. Discussed recommendations for diet, exercise, and lifestyle. · Neuropathy: Chronic, stable. Follow up with specialist as scheduled. · Leg Cramps: Chronic, stable. Continue to monitor. · Elevated Vitamin D: discussed holding vitamin D and continuing to monitor  · Hypothalmic Dysfunction: Chronic, stable. Advised patient to schedule visit with endocrinology for further evaluation. · Fatigue: Duration: Chronic, improved. Continue to monitor. · FINESSE on CPAP: Chronic, stable. Follow up with specialist as scheduled. · HLD: Chronic, stable. Continue current therapy. · Occupational Asthma: Chronic, stable. Follow up with specialist as scheduled. · Obesity: I have reviewed/discussed the above normal BMI with the patient. I have recommended the following interventions: dietary management education, guidance, and counseling, encourage exercise, monitor weight and prescribed dietary intake. I have discussed the diagnosis with the patient and the intended plan as seen in the above orders.   The patient has received an after-visit summary along with patient information handout. I have discussed medication side effects and warnings with the patient as well. Disposition  Follow-up and Dispositions    · Return in about 6 months (around 7/12/2022) for follow up of chronic conditions.            Favio Vazquez MD

## 2022-01-12 NOTE — PATIENT INSTRUCTIONS
Learning About the [facility] Diet  What is the Mediterranean diet? The Mediterranean diet is a style of eating rather than a diet plan. It features foods eaten in [place], [place], [place] and [place], and other countries along the [facility]. It emphasizes eating foods like fish, fruits, vegetables, beans, high-fiber breads and whole grains, nuts, and olive oil. This style of eating includes limited red meat, cheese, and sweets. Why choose the Mediterranean diet? A Mediterranean-style diet may improve heart health. It contains more fat than other heart-healthy diets. But the fats are mainly from nuts, unsaturated oils (such as fish oils and olive oil), and certain nut or seed oils (such as canola, soybean, or flaxseed oil). These fats may help protect the heart and blood vessels. How can you get started on the Mediterranean diet? Here are some things you can do to switch to a more Mediterranean way of eating. What to eat  · Eat a variety of fruits and vegetables each day, such as grapes, blueberries, tomatoes, broccoli, peppers, figs, olives, spinach, eggplant, beans, lentils, and chickpeas. · Eat a variety of whole-grain foods each day, such as oats, brown rice, and whole wheat bread, pasta, and couscous. · Eat fish at least 2 times a week. Try tuna, salmon, mackerel, lake trout, herring, or sardines. · Eat moderate amounts of low-fat dairy products, such as milk, cheese, or yogurt. · Eat moderate amounts of poultry and eggs. · Choose healthy (unsaturated) fats, such as nuts, olive oil, and certain nut or seed oils like canola, soybean, and flaxseed. · Limit unhealthy (saturated) fats, such as butter, palm oil, and coconut oil. And limit fats found in animal products, such as meat and dairy products made with whole milk. Try to eat red meat only a few times a month in very small amounts. · Limit sweets and desserts to only a few times a week.  This includes sugar-sweetened drinks like soda. The Mediterranean diet may also include red wine with your meal--1 glass each day for women and up to 2 glasses a day for men. Tips for eating at home  · Use herbs, spices, garlic, lemon zest, and citrus juice instead of salt to add flavor to foods. · Add avocado slices to your sandwich instead of ruiz. · Have fish for lunch or dinner instead of red meat. Brush the fish with olive oil, and broil or grill it. · Sprinkle your salad with seeds or nuts instead of cheese. · Cook with olive or canola oil instead of butter or oils that are high in saturated fat. · Switch from 2% milk or whole milk to 1% or fat-free milk. · Dip raw vegetables in a vinaigrette dressing or hummus instead of dips made from mayonnaise or sour cream.  · Have a piece of fruit for dessert instead of a piece of cake. Try baked apples, or have some dried fruit. Tips for eating out  · Try broiled, grilled, baked, or poached fish instead of having it fried or breaded. · Ask your  to have your meals prepared with olive oil instead of butter. · Order dishes made with marinara sauce or sauces made from olive oil. Avoid sauces made from cream or mayonnaise. · Choose whole-grain breads, whole wheat pasta and pizza crust, brown rice, beans, and lentils. · Cut back on butter or margarine on bread. Instead, you can dip your bread in a small amount of olive oil. · Ask for a side salad or grilled vegetables instead of french fries or chips. Where can you learn more? Go to http://www.boyce.com/  Enter O407 in the search box to learn more about \"Learning About the Mediterranean Diet. \"  Current as of: December 17, 2020               Content Version: 13.0  © 2984-5785 Healthwise, Incorporated. Care instructions adapted under license by [employer] (which disclaims liability or warranty for this information).  If you have questions about a medical condition or this instruction, always ask your healthcare professional. [address] any warranty or liability for your use of this information.

## 2022-01-12 NOTE — PROGRESS NOTES
Chief Complaint   Patient presents with    Complete Physical        1. \"Have you been to the ER, urgent care clinic since your last visit? Hospitalized since your last visit? \" No    2. \"Have you seen or consulted any other health care providers outside of the [address] [place] [address] since your last visit? \" No     3. For patients aged 39-70: Has the patient had a colonoscopy? Yes, HM satisfied with blue hyperlink     If the patient is female:    4. For patients aged 41-77: Has the patient had a mammogram within the past 2 years? NA based on age or sex    11. For patients aged 21-30: Has the patient had a pap smear?  NA based on age or sex    3 most recent PHQ Screens 11/26/2021   Little interest or pleasure in doing things Not at all   Feeling down, depressed, irritable, or hopeless Not at all   Total Score PHQ 2 0

## 2022-01-13 LAB
25(OH)D3+25(OH)D2 SERPL-MCNC: 83.6 NG/ML (ref 30–100)
PSA SERPL-MCNC: 0.7 NG/ML (ref 0–4)

## 2022-01-18 DIAGNOSIS — E78.00 HYPERCHOLESTEROLEMIA: Chronic | ICD-10-CM

## 2022-01-19 PROBLEM — Z99.89 OSA ON CPAP: Status: ACTIVE | Noted: 2022-01-19

## 2022-01-19 PROBLEM — G62.9 NEUROPATHY: Status: ACTIVE | Noted: 2022-01-19

## 2022-01-19 PROBLEM — R53.82 CHRONIC FATIGUE: Status: ACTIVE | Noted: 2022-01-19

## 2022-01-19 PROBLEM — G47.33 OSA ON CPAP: Status: ACTIVE | Noted: 2022-01-19

## 2022-01-20 RX ORDER — ROSUVASTATIN CALCIUM 10 MG/1
TABLET, COATED ORAL
Qty: 30 TABLET | Refills: 0 | OUTPATIENT
Start: 2022-01-20

## 2022-01-20 RX ORDER — FERROUS SULFATE 325(65) MG
TABLET, DELAYED RELEASE (ENTERIC COATED) ORAL
Qty: 30 TABLET | Refills: 0 | OUTPATIENT
Start: 2022-01-20

## 2022-01-26 RX ORDER — FERROUS SULFATE 325(65) MG
325 TABLET, DELAYED RELEASE (ENTERIC COATED) ORAL
Qty: 90 TABLET | Refills: 1 | Status: SHIPPED | OUTPATIENT
Start: 2022-01-26 | End: 2022-02-14 | Stop reason: SDUPTHER

## 2022-01-26 RX ORDER — ROSUVASTATIN CALCIUM 10 MG/1
TABLET, COATED ORAL
Qty: 90 TABLET | Refills: 1 | Status: SHIPPED | OUTPATIENT
Start: 2022-01-26 | End: 2022-04-11 | Stop reason: SDUPTHER

## 2022-02-14 RX ORDER — FERROUS SULFATE 325(65) MG
TABLET, DELAYED RELEASE (ENTERIC COATED) ORAL
Qty: 30 TABLET | OUTPATIENT
Start: 2022-02-14

## 2022-02-14 RX ORDER — FERROUS SULFATE 325(65) MG
325 TABLET, DELAYED RELEASE (ENTERIC COATED) ORAL
Qty: 90 TABLET | Refills: 1 | Status: SHIPPED | OUTPATIENT
Start: 2022-02-14 | End: 2022-07-01 | Stop reason: SDUPTHER

## 2022-03-18 PROBLEM — R53.82 CHRONIC FATIGUE: Status: ACTIVE | Noted: 2022-01-19

## 2022-03-18 PROBLEM — E66.9 OBESITY (BMI 30-39.9): Status: ACTIVE | Noted: 2017-12-18

## 2022-03-19 PROBLEM — G62.9 NEUROPATHY: Status: ACTIVE | Noted: 2022-01-19

## 2022-03-19 PROBLEM — Z99.89 OSA ON CPAP: Status: ACTIVE | Noted: 2022-01-19

## 2022-03-19 PROBLEM — J45.40 MODERATE PERSISTENT ASTHMA WITHOUT COMPLICATION: Status: ACTIVE | Noted: 2018-12-07

## 2022-03-19 PROBLEM — G47.33 OSA ON CPAP: Status: ACTIVE | Noted: 2022-01-19

## 2022-04-11 ENCOUNTER — OFFICE VISIT (OUTPATIENT)
Dept: FAMILY MEDICINE CLINIC | Age: 62
End: 2022-04-11
Payer: COMMERCIAL

## 2022-04-11 VITALS
BODY MASS INDEX: 36.13 KG/M2 | HEIGHT: 73 IN | OXYGEN SATURATION: 98 % | DIASTOLIC BLOOD PRESSURE: 60 MMHG | HEART RATE: 67 BPM | TEMPERATURE: 97.5 F | WEIGHT: 272.6 LBS | RESPIRATION RATE: 18 BRPM | SYSTOLIC BLOOD PRESSURE: 118 MMHG

## 2022-04-11 DIAGNOSIS — E78.00 HYPERCHOLESTEROLEMIA: Chronic | ICD-10-CM

## 2022-04-11 DIAGNOSIS — R73.09 ELEVATED GLUCOSE: ICD-10-CM

## 2022-04-11 DIAGNOSIS — D50.9 IRON DEFICIENCY ANEMIA, UNSPECIFIED IRON DEFICIENCY ANEMIA TYPE: ICD-10-CM

## 2022-04-11 DIAGNOSIS — E23.6 HYPOTHALAMIC DYSFUNCTION (HCC): Chronic | ICD-10-CM

## 2022-04-11 DIAGNOSIS — R25.2 LEG CRAMPS: ICD-10-CM

## 2022-04-11 DIAGNOSIS — E67.3 HYPERVITAMINOSIS D: Primary | ICD-10-CM

## 2022-04-11 DIAGNOSIS — G47.33 OSA ON CPAP: ICD-10-CM

## 2022-04-11 DIAGNOSIS — E66.9 OBESITY (BMI 30-39.9): ICD-10-CM

## 2022-04-11 DIAGNOSIS — R53.82 CHRONIC FATIGUE: ICD-10-CM

## 2022-04-11 DIAGNOSIS — Z57.9 MILD INTERMITTENT OCCUPATIONAL ASTHMA WITHOUT COMPLICATION: ICD-10-CM

## 2022-04-11 DIAGNOSIS — Z99.89 OSA ON CPAP: ICD-10-CM

## 2022-04-11 DIAGNOSIS — J45.20 MILD INTERMITTENT OCCUPATIONAL ASTHMA WITHOUT COMPLICATION: ICD-10-CM

## 2022-04-11 DIAGNOSIS — G62.9 NEUROPATHY: ICD-10-CM

## 2022-04-11 PROCEDURE — 99213 OFFICE O/P EST LOW 20 MIN: CPT | Performed by: FAMILY MEDICINE

## 2022-04-11 RX ORDER — MELOXICAM 15 MG/1
TABLET ORAL
COMMUNITY

## 2022-04-11 RX ORDER — CALCIUM CARBONATE/VITAMIN D3 500-10/5ML
LIQUID (ML) ORAL
COMMUNITY
Start: 2021-06-01

## 2022-04-11 RX ORDER — ROSUVASTATIN CALCIUM 10 MG/1
TABLET, COATED ORAL
Qty: 90 TABLET | Refills: 1 | Status: SHIPPED | OUTPATIENT
Start: 2022-04-11 | End: 2022-09-19 | Stop reason: SDUPTHER

## 2022-04-11 RX ORDER — ACETAMINOPHEN 500 MG
TABLET ORAL
COMMUNITY
Start: 2021-08-25

## 2022-04-11 RX ORDER — PLANT STANOL ESTER 450 MG
TABLET ORAL
COMMUNITY

## 2022-04-11 SDOH — HEALTH STABILITY - PHYSICAL HEALTH: OCCUPATIONAL EXPOSURE TO UNSPECIFIED RISK FACTOR: Z57.9

## 2022-04-11 NOTE — PROGRESS NOTES
Chief Complaint   Patient presents with    Abnormal Lab Results     Vitamin D deficiency and Iron Level     1. \"Have you been to the ER, urgent care clinic since your last visit? Hospitalized since your last visit? \" No    2. \"Have you seen or consulted any other health care providers outside of the [address] [place] [address] since your last visit? \" No     3. For patients aged 39-70: Has the patient had a colonoscopy / FIT/ Cologuard? Yes - no Care Gap present      If the patient is female:    4. For patients aged 41-77: Has the patient had a mammogram within the past 2 years? NA - based on age or sex      11. For patients aged 21-65: Has the patient had a pap smear?  NA - based on age or sex

## 2022-04-11 NOTE — PROGRESS NOTES
Cristina Barrios  58 y.o. male  1960  [facility] [address] [phone]  [id number]     [address]       Encounter Date: 4/11/2022           Established Patient Visit Note: Ria Xie MD    Reason for Appointment:  Chief Complaint   Patient presents with    Abnormal Lab Results     Vitamin D deficiency and Iron Level         History of Present Illness:  History provided by patient    Cristina Barrios is a 58 y.o. 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 He has not yet discussed this with endocrinology  · Fatigue: Duration: Jan, 2020; he believes it to be related to an undiagnosed case of COVID in Jan, 2020. His wife reports that he had testosterone evaluated with endocrinology who did not identify a problem with this. He reports that the fatigue has improved recently. · FINESSE on CPAP: managed by PAR; he reports good compliance with CPAP  · HLD: tolerating Crestor with CoQ10 and magnesium  · Occupational Asthma: his wife reports as related to history of freon exposure; followed by pulmonology. He reports that his breathing has been pretty good recently. · Obesity: He has gained 3 lbs since Jan, 2022; he stays active with work. His wife reports that due to being busy he has not been eating as healthy as they would like. He believes that it may be realted to gaining muscle with work.          Review of Systems  All other ROS were reviewed and are negative except as discussed in HPI      Allergies: Cortisone (hydrocortisone) [hydrocortisone] and Zocor [simvastatin]    Medications: Current Outpatient Medications:     potassium gluconate 550 mg (90 mg) tab, 1 tablet, Disp: , Rfl:     ascorbate calcium (BUFFERED VITAMIN C PO), , Disp: , Rfl:     cholecalciferol (Vitamin D3) (2,000 UNITS /50 MCG) cap capsule, , Disp: , Rfl:     GLUTATHIONE, , Disp: , Rfl:     Zinc Gluconate-Zinc Picolinate 30 mg cap, , Disp: , Rfl:     rosuvastatin (CRESTOR) 10 mg tablet, TAKE 1 TAB BY MOUTH NIGHTLY. FOR CHOLESTEROL, Disp: 90 Tablet, Rfl: 1    ferrous sulfate (IRON) 325 mg (65 mg iron) EC tablet, Take 1 Tablet by mouth Daily (before breakfast). , Disp: 90 Tablet, Rfl: 1    vitamin B complex (B COMPLEX 1 PO), , Disp: , Rfl:     multivitamin pwpk, , Disp: , Rfl:     magnesium glycinate-mag oxide 120 mg magnesium cap, , Disp: , Rfl:     QUERCETIN DIHYDRATE, BULK,, , Disp: , Rfl:     acetylcysteine (N-ACETYL-L-CYSTEINE), , Disp: , Rfl:     ubidecarenone/vitamin E mixed (COQ10  PO), , Disp: , Rfl:     calcium carb-vitamin D3-vit K2 (Calcium Plus MenaQ7 Adult) 500 mg calcium- 200 unit-90 mcg tab, , Disp: , Rfl:     BORON, BULK,, , Disp: , Rfl:     bismuth subsalicylate (PEPTO-BISMOL PO), Take  by mouth as needed. , Disp: , Rfl:     OTHER, Take  by mouth six (6) days a week.  prime secretagogue supplement, Disp: , Rfl:     albuterol (PROAIR HFA) 90 mcg/actuation inhaler, INHALE 2 INHALATIONS BY MOUTH EVERY 6 HOURS AS NEEDED FOR WHEEZING OR SHORTNESS OF BREATH, Disp: 1 Inhaler, Rfl: 1    meloxicam (MOBIC) 15 mg tablet, 1 tab(s), Disp: , Rfl:     History  Patient Care Team:  Blake Sandoval MD as PCP - General (Family Medicine)  Blake Sandoval MD as PCP - [facility]  Lori Manuel MD (Pulmonary Disease)    Past Medical History: he has a past medical history of Anal fissure, Arthritis, Asthma, Benign positional vertigo, Hematuria of undiagnosed cause (01/2018), Hypercholesterolemia, [facility] ([facility]), Neuropathy, Pulmonary nodule seen on imaging study (7/12/16), and Vitamin D deficiency. Past Surgical History: he has a past surgical history that includes hx colonoscopy (01/2018); hx orthopaedic; and hx urological (2018). Family Medical History: family history is not on file. Social History: he reports that he has never smoked. He has never used smokeless tobacco. He reports current alcohol use of about 0.8 standard drinks of alcohol per week. He reports that he does not use drugs. Objective:   Visit Vitals  /60 (BP 1 Location: Left upper arm, BP Patient Position: Sitting, BP Cuff Size: Large adult)   Pulse 67   Temp 97.5 °F (36.4 °C) (Temporal)   Resp 18   Ht 6' 1\" (1.854 m)   Wt 272 lb 9.6 oz (123.7 kg)   SpO2 98%   BMI 35.97 kg/m²     Wt Readings from Last 3 Encounters:   04/11/22 272 lb 9.6 oz (123.7 kg)   01/12/22 269 lb (122 kg)   11/26/21 266 lb 6.4 oz (120.8 kg)       Physical Exam  HENT:      Head: Normocephalic and atraumatic. Right Ear: External ear normal.      Left Ear: External ear normal.      Nose: Nose normal.      Mouth/Throat:      Pharynx: No oropharyngeal exudate. Eyes:      General: No scleral icterus. Right eye: No discharge. Left eye: No discharge. Conjunctiva/sclera: Conjunctivae normal.      Pupils: Pupils are equal, round, and reactive to light. Neck:      Thyroid: No thyromegaly. Vascular: No JVD. Trachea: No tracheal deviation. Cardiovascular:      Rate and Rhythm: Normal rate and regular rhythm. Heart sounds: Normal heart sounds. No murmur heard. No friction rub. No gallop. Pulmonary:      Effort: Pulmonary effort is normal. No respiratory distress. Breath sounds: Normal breath sounds. No stridor. No wheezing. Abdominal:      General: Bowel sounds are normal. There is no distension. Palpations: Abdomen is soft. There is no mass. Tenderness: There is no abdominal tenderness. There is no guarding or rebound.    Musculoskeletal:         General: No tenderness or deformity. Normal range of motion. Cervical back: Normal range of motion and neck supple. Lymphadenopathy:      Cervical: No cervical adenopathy. Skin:     General: Skin is warm and dry. Neurological:      Mental Status: He is alert. Cranial Nerves: No cranial nerve deficit. Coordination: Coordination normal.      Gait: Gait is intact. Deep Tendon Reflexes: Reflexes normal.         Assessment & Plan:      ICD-10-CM ICD-9-CM    1. Hypervitaminosis D  E67.3 278.4 VITAMIN D, 25 HYDROXY      VITAMIN D, 25 HYDROXY   2. Hypercholesterolemia  E78.00 272.0 rosuvastatin (CRESTOR) 10 mg tablet      METABOLIC PANEL, COMPREHENSIVE      LIPID PANEL      METABOLIC PANEL, COMPREHENSIVE      LIPID PANEL   3. Iron deficiency anemia, unspecified iron deficiency anemia type  D50.9 280.9 CBC W/O DIFF      IRON PROFILE      FERRITIN      CBC W/O DIFF      IRON PROFILE      FERRITIN   4. Leg cramps  R25.2 729.82 MAGNESIUM      MAGNESIUM   5. Elevated glucose  R73.09 790.29 HEMOGLOBIN A1C WITH EAG      HEMOGLOBIN A1C WITH EAG   6. Neuropathy  G62.9 355.9    7. Hypothalamic dysfunction (HCC)  E23.6 253.8    8. Chronic fatigue  R53.82 780.79    9. FINESSE on CPAP  G47.33 327.23     Z99.89 V46.8    10. Mild intermittent occupational asthma without complication  Q54.42 004.31     Z57.9 V62.1    11. Obesity (BMI 30-39. 9)  E66.9 278.00      · Obesity: I have reviewed/discussed the above normal BMI with the patient. I have recommended the following interventions: dietary management education, guidance, and counseling, encourage exercise, monitor weight and prescribed dietary intake.  All other conditions listed above: Chronic, stable and/or managed by specialist. Will continue current treatment regimen. Will check labs as reflected above. Discussed following up with specialist as scheduled. Discussed recommendations for diet and exercise.            I have discussed the diagnosis with the patient and the intended plan as seen in the above orders. The patient has received an after-visit summary along with patient information handout. I have discussed medication side effects and warnings with the patient as well. Disposition  Follow-up and Dispositions    · Return in about 6 months (around 10/11/2022).            Reg Rivas MD

## 2022-04-12 LAB
25(OH)D3+25(OH)D2 SERPL-MCNC: 47.8 NG/ML (ref 30–100)
ALBUMIN SERPL-MCNC: 4.1 G/DL (ref 3.8–4.8)
ALBUMIN/GLOB SERPL: 1.7 {RATIO} (ref 1.2–2.2)
ALP SERPL-CCNC: 79 IU/L (ref 44–121)
ALT SERPL-CCNC: 22 IU/L (ref 0–44)
AST SERPL-CCNC: 20 IU/L (ref 0–40)
BILIRUB SERPL-MCNC: 0.3 MG/DL (ref 0–1.2)
BUN SERPL-MCNC: 17 MG/DL (ref 8–27)
BUN/CREAT SERPL: 14 (ref 10–24)
CALCIUM SERPL-MCNC: 8.9 MG/DL (ref 8.6–10.2)
CHLORIDE SERPL-SCNC: 104 MMOL/L (ref 96–106)
CHOLEST SERPL-MCNC: 121 MG/DL (ref 100–199)
CO2 SERPL-SCNC: 23 MMOL/L (ref 20–29)
CREAT SERPL-MCNC: 1.25 MG/DL (ref 0.76–1.27)
EGFR: 65 ML/MIN/1.73
ERYTHROCYTE [DISTWIDTH] IN BLOOD BY AUTOMATED COUNT: 12.8 % (ref 11.6–15.4)
EST. AVERAGE GLUCOSE BLD GHB EST-MCNC: 120 MG/DL
FERRITIN SERPL-MCNC: 163 NG/ML (ref 30–400)
GLOBULIN SER CALC-MCNC: 2.4 G/DL (ref 1.5–4.5)
GLUCOSE SERPL-MCNC: 90 MG/DL (ref 65–99)
HBA1C MFR BLD: 5.8 % (ref 4.8–5.6)
HCT VFR BLD AUTO: 38.2 % (ref 37.5–51)
HDLC SERPL-MCNC: 43 MG/DL
HGB BLD-MCNC: 13 G/DL (ref 13–17.7)
IMP & REVIEW OF LAB RESULTS: NORMAL
IRON SATN MFR SERPL: 37 % (ref 15–55)
IRON SERPL-MCNC: 91 UG/DL (ref 38–169)
LDLC SERPL CALC-MCNC: 57 MG/DL (ref 0–99)
MAGNESIUM SERPL-MCNC: 2.4 MG/DL (ref 1.6–2.3)
MCH RBC QN AUTO: 33.2 PG (ref 26.6–33)
MCHC RBC AUTO-ENTMCNC: 34 G/DL (ref 31.5–35.7)
MCV RBC AUTO: 97 FL (ref 79–97)
PLATELET # BLD AUTO: 237 X10E3/UL (ref 150–450)
POTASSIUM SERPL-SCNC: 5.8 MMOL/L (ref 3.5–5.2)
PROT SERPL-MCNC: 6.5 G/DL (ref 6–8.5)
RBC # BLD AUTO: 3.92 X10E6/UL (ref 4.14–5.8)
SODIUM SERPL-SCNC: 141 MMOL/L (ref 134–144)
TIBC SERPL-MCNC: 249 UG/DL (ref 250–450)
TRIGL SERPL-MCNC: 116 MG/DL (ref 0–149)
UIBC SERPL-MCNC: 158 UG/DL (ref 111–343)
VLDLC SERPL CALC-MCNC: 21 MG/DL (ref 5–40)
WBC # BLD AUTO: 7.5 X10E3/UL (ref 3.4–10.8)

## 2022-04-12 NOTE — PROGRESS NOTES
Called and discussed lab results. K+ elevated. Advised going to urgent care for recheck of potassium. They expresses agreement. Vit D down from 83.6 to 47.8. Advised increasing vit d from 5000 daily to 7000 daily. A1c in prediabetes range. Discussed diet and exercise with recheck in 3 months.

## 2022-04-18 PROBLEM — J45.40 MODERATE PERSISTENT ASTHMA WITHOUT COMPLICATION: Status: RESOLVED | Noted: 2018-12-07 | Resolved: 2022-04-18

## 2022-04-18 PROBLEM — R25.2 LEG CRAMPS: Status: ACTIVE | Noted: 2022-04-18

## 2022-07-01 RX ORDER — FERROUS SULFATE 325(65) MG
325 TABLET, DELAYED RELEASE (ENTERIC COATED) ORAL
Qty: 90 TABLET | Refills: 1 | Status: SHIPPED | OUTPATIENT
Start: 2022-07-01

## 2022-07-01 NOTE — TELEPHONE ENCOUNTER
Last Visit: 4/11/22 MD Jg Flores  Next Appointment: None  Previous Refill Encounter(s): 2/14/22 90 + 1    Requested Prescriptions     Pending Prescriptions Disp Refills    ferrous sulfate (IRON) 325 mg (65 mg iron) EC tablet 90 Tablet 1     Sig: Take 1 Tablet by mouth Daily (before breakfast).      For [facility] in place:    Recommendation Provided To:    Intervention Detail: New Rx: 1, reason: Patient Preference   Gap Closed?:    Intervention Accepted By:   Jayson Mccartney Time Spent (min): 2

## 2022-07-20 ENCOUNTER — OFFICE VISIT (OUTPATIENT)
Dept: FAMILY MEDICINE CLINIC | Age: 62
End: 2022-07-20

## 2022-07-20 VITALS
SYSTOLIC BLOOD PRESSURE: 106 MMHG | OXYGEN SATURATION: 98 % | DIASTOLIC BLOOD PRESSURE: 63 MMHG | HEIGHT: 73 IN | BODY MASS INDEX: 35.33 KG/M2 | HEART RATE: 77 BPM | RESPIRATION RATE: 18 BRPM | TEMPERATURE: 98.1 F | WEIGHT: 266.6 LBS

## 2022-07-20 DIAGNOSIS — G47.33 OSA ON CPAP: ICD-10-CM

## 2022-07-20 DIAGNOSIS — E66.9 OBESITY (BMI 30-39.9): ICD-10-CM

## 2022-07-20 DIAGNOSIS — E78.00 HYPERCHOLESTEROLEMIA: ICD-10-CM

## 2022-07-20 DIAGNOSIS — G62.9 NEUROPATHY: Primary | ICD-10-CM

## 2022-07-20 DIAGNOSIS — J45.20 MILD INTERMITTENT OCCUPATIONAL ASTHMA WITHOUT COMPLICATION: ICD-10-CM

## 2022-07-20 DIAGNOSIS — Z57.9 MILD INTERMITTENT OCCUPATIONAL ASTHMA WITHOUT COMPLICATION: ICD-10-CM

## 2022-07-20 DIAGNOSIS — E67.3 HYPERVITAMINOSIS D: ICD-10-CM

## 2022-07-20 DIAGNOSIS — Z99.89 OSA ON CPAP: ICD-10-CM

## 2022-07-20 DIAGNOSIS — R25.2 LEG CRAMPS: ICD-10-CM

## 2022-07-20 DIAGNOSIS — R06.09 DOE (DYSPNEA ON EXERTION): ICD-10-CM

## 2022-07-20 DIAGNOSIS — E23.6 HYPOTHALAMIC DYSFUNCTION (HCC): ICD-10-CM

## 2022-07-20 DIAGNOSIS — R73.03 PREDIABETES: ICD-10-CM

## 2022-07-20 DIAGNOSIS — D50.9 IRON DEFICIENCY ANEMIA, UNSPECIFIED IRON DEFICIENCY ANEMIA TYPE: ICD-10-CM

## 2022-07-20 PROCEDURE — 99214 OFFICE O/P EST MOD 30 MIN: CPT | Performed by: FAMILY MEDICINE

## 2022-07-20 SDOH — HEALTH STABILITY - PHYSICAL HEALTH: OCCUPATIONAL EXPOSURE TO UNSPECIFIED RISK FACTOR: Z57.9

## 2022-07-20 NOTE — PROGRESS NOTES
Ronen Fernández  58 y.o. male  1960  [facility] [id number]  [id number]     [address]       Encounter Date: 7/20/2022           Established Patient Visit Note: Van Crawford MD    Reason for Appointment:  Chief Complaint   Patient presents with    Leg Pain     Bilateral Hip to knee      Numbness     From knee down bilateral     Arm Pain     Feel like arms are weak         History of Present Illness:  History provided by patient and wife    Ronen Fernández is a 58 y.o. male who presents to clinic today for:       Prediabetes: last A1c was 5.8 on 4/11/22  Neuropathy: followed by podiatry Rashawn Parsons). However, he reports that this has worsened recently and he would like to see a different specialist. He reports that Neuropathy was in his lower legs, but he has recently noticied some weakness in his arms. He reports that he used to be able to lift 10 lb bags with work, but now he can barley lift 7lb bags. He reports that he has fallen 3 times over the last 3 months. He reports that they are trying to fire him and work, and they have given him 30 days to turn things around. He reports that this has made work more difficult, and he is concerned about his safety at work. Positive Depression Screening: he reports that he believes this to be situational related to difficulty with work. He denies any SI. He reports that his mood would improve if his work situation improved. 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  He has not yet discussed this with endocrinology  Fatigue: Duration: Jan, 2020; he believes it to be related to an undiagnosed case of COVID in Jan, 2020. His wife reports that he had testosterone evaluated with endocrinology who did not identify a problem with this. He reports that the fatigue had improved, but it has been worse over the last 3 months. He also reports that he has noticed increased STEVENSON recently, but he denies any associated CP. FINESSE on CPAP: managed by PAR; he reports good compliance with CPAP  HLD: tolerating Crestor with CoQ10 and magnesium  Occupational Asthma: his wife reports as related to history of freon exposure; followed by pulmonology. He has recently noticied some increased STEVENSON. Obesity: He has last 6 lbs from last visit. He continues to work on diet and exercise. Review of Systems  All other ROS were reviewed and are negative except as discussed in HPI      Allergies: Cortisone (hydrocortisone) [hydrocortisone] and Zocor [simvastatin]    Medications:     Current Outpatient Medications:     ferrous sulfate (IRON) 325 mg (65 mg iron) EC tablet, Take 1 Tablet by mouth Daily (before breakfast). , Disp: 90 Tablet, Rfl: 1    potassium gluconate 550 mg (90 mg) tab, 1 tablet, Disp: , Rfl:     ascorbate calcium (BUFFERED VITAMIN C PO), , Disp: , Rfl:     cholecalciferol (VITAMIN D3) (2,000 UNITS /50 MCG) cap capsule, , Disp: , Rfl:     GLUTATHIONE, , Disp: , Rfl:     Zinc Gluconate-Zinc Picolinate 30 mg cap, , Disp: , Rfl:     rosuvastatin (CRESTOR) 10 mg tablet, TAKE 1 TAB BY MOUTH NIGHTLY.  FOR CHOLESTEROL, Disp: 90 Tablet, Rfl: 1    vitamin B complex (B COMPLEX 1 PO), , Disp: , Rfl:     multivitamin pwpk, , Disp: , Rfl:     magnesium glycinate-mag oxide 120 mg magnesium cap, , Disp: , Rfl:     QUERCETIN DIHYDRATE, BULK,, , Disp: , Rfl:     acetylcysteine (N-ACETYL-L-CYSTEINE), , Disp: , Rfl:     ubidecarenone/vitamin E mixed (COQ10  PO), , Disp: , Rfl:     calcium carb-vitamin D3-vit K2 (Calcium Plus MenaQ7 Adult) 500 mg calcium- 200 unit-90 mcg tab, , Disp: , Rfl:     BORON, BULK,, , Disp: , Rfl:     OTHER, Take  by mouth six (6) days a week. prime secretagogue supplement, Disp: , Rfl:     albuterol (PROAIR HFA) 90 mcg/actuation inhaler, INHALE 2 INHALATIONS BY MOUTH EVERY 6 HOURS AS NEEDED FOR WHEEZING OR SHORTNESS OF BREATH, Disp: 1 Inhaler, Rfl: 1    meloxicam (MOBIC) 15 mg tablet, 1 tab(s) (Patient not taking: Reported on 7/20/2022), Disp: , Rfl:     bismuth subsalicylate (PEPTO-BISMOL PO), Take  by mouth as needed. (Patient not taking: Reported on 7/20/2022), Disp: , Rfl:     History  Patient Care Team:  Gemini Sebastian MD as PCP - General (Family Medicine)  Gemini Sebastian MD as PCP - [facility] Provider  Joann Cowart MD (Pulmonary Disease)    Past Medical History: he has a past medical history of Anal fissure, Arthritis, Asthma, Benign positional vertigo, Hematuria of undiagnosed cause (01/2018), Hypercholesterolemia, Hypothalamic syndrome ([facility] [address] (1969), Muscle weakness, Neuropathy, Pulmonary nodule seen on imaging study (07/12/2016), and Vitamin D deficiency. Past Surgical History: he has a past surgical history that includes hx colonoscopy (01/2018); hx orthopaedic; and hx urological (2018). Family Medical History: family history is not on file. Social History: he reports that he has never smoked. He has never used smokeless tobacco. He reports current alcohol use of about 0.8 standard drinks per week. He reports that he does not use drugs. Objective:   Visit Vitals  /63 (BP 1 Location: Left arm, BP Patient Position: Sitting, BP Cuff Size: Adult long)   Pulse 77   Temp 98.1 °F (36.7 °C) (Temporal)   Resp 18   Ht 6' 1\" (1.854 m)   Wt 266 lb 9.6 oz (120.9 kg)   SpO2 98%   BMI 35.17 kg/m²     Wt Readings from Last 3 Encounters:   07/20/22 266 lb 9.6 oz (120.9 kg)   04/11/22 272 lb 9.6 oz (123.7 kg)   01/12/22 269 lb (122 kg)       Physical Exam  Constitutional:       Appearance: Normal appearance. HENT:      Head: Normocephalic and atraumatic. Right Ear: External ear normal.      Left Ear: External ear normal.      Nose: Nose normal.      Mouth/Throat:      Pharynx: No oropharyngeal exudate. Eyes:      General: No scleral icterus. Right eye: No discharge. Left eye: No discharge. Conjunctiva/sclera: Conjunctivae normal.      Pupils: Pupils are equal, round, and reactive to light. Neck:      Thyroid: No thyromegaly. Vascular: No JVD. Trachea: No tracheal deviation. Cardiovascular:      Rate and Rhythm: Normal rate and regular rhythm. Heart sounds: Normal heart sounds. No murmur heard. No friction rub. No gallop. Pulmonary:      Effort: Pulmonary effort is normal. No respiratory distress. Breath sounds: Normal breath sounds. No stridor. No wheezing. Musculoskeletal:         General: No tenderness or deformity. Normal range of motion. Cervical back: Normal range of motion and neck supple. Lymphadenopathy:      Cervical: No cervical adenopathy. Skin:     General: Skin is warm and dry. Neurological:      Mental Status: He is alert. Cranial Nerves: No cranial nerve deficit. Coordination: Coordination normal.      Gait: Gait is intact. Deep Tendon Reflexes: Reflexes normal.       Assessment & Plan:      ICD-10-CM ICD-9-CM    1. Neuropathy  G62.9 355.9 REFERRAL TO NEUROLOGY      PROTEIN ELECTROPHORESIS      VITAMIN B12 & FOLATE      CBC W/O DIFF      METABOLIC PANEL, COMPREHENSIVE      URINALYSIS W/ RFLX MICROSCOPIC      TSH 3RD GENERATION      MAGNESIUM      PROTEIN ELECTROPHORESIS      VITAMIN B12 & FOLATE      CBC W/O DIFF      METABOLIC PANEL, COMPREHENSIVE      URINALYSIS W/ RFLX MICROSCOPIC      TSH 3RD GENERATION      MAGNESIUM      2. Prediabetes  R73.03 790.29 HEMOGLOBIN A1C WITH EAG      HEMOGLOBIN A1C WITH EAG      3. Hypervitaminosis D  E67.3 278.4 VITAMIN D, 25 HYDROXY      VITAMIN D, 25 HYDROXY      4. Hypercholesterolemia  E78.00 272.0       5.  Iron deficiency anemia, unspecified iron deficiency anemia type  D50.9 280.9       6. Hypothalamic dysfunction (HCC)  E23.6 253.8 REFERRAL TO ENDOCRINOLOGY      7. Leg cramps  R25.2 729.82 CK      CK      8. STEVENSON (dyspnea on exertion)  R06.00 786.09 REFERRAL TO CARDIOLOGY      XR CHEST PA LAT      9. FINESSE on CPAP  G47.33 327.23     Z99.89 V46.8       10. Obesity (BMI 30-39. 9)  E66.9 278.00       11. Mild intermittent occupational asthma without complication  V91.43 286.50     Z57.9 V62.1         Neuropathy: Chronic, uncontrolled. Evaluate further as above and referral to neurology. Discussed red flag symptoms and reasons to call or go to ED  STEVENSON: Chronic, uncontrolled. Evaluate further as above and referral to cardiology. Discussed red flag symptoms and reasons to call or go to ED  All other conditions listed above: Chronic, stable and/or managed by specialist. Will continue current treatment regimen. Will check labs as reflected above. Discussed following up with specialist as scheduled. Discussed recommendations for diet and exercise. Will provide work note for the next two weeks, and lito holleyr each out to us to let us know how he is doing. He will also work with the [facility] for disability evaluation. I have discussed the diagnosis with the patient and the intended plan as seen in the above orders. The patient has received an after-visit summary along with patient information handout. I have discussed medication side effects and warnings with the patient as well. Disposition  Follow-up and Dispositions    Return in about 4 weeks (around 8/17/2022) for follow up of chronic conditions.            Cal Smith MD

## 2022-07-20 NOTE — LETTER
NOTIFICATION RETURN TO WORK / SCHOOL    7/20/2022 11:58 AM    Mr. Jumana Thompson  [address]  [facility] [phone]      To Whom It May Concern:    Jumana Thompson is currently under the care of [facility] [address]. Please excuse patient from work from 7/20/22 to 8/3/22  He will return to work/school on: 8/4/22    If there are questions or concerns please have the patient contact our office.         Sincerely,      Bobby Mahoney MD

## 2022-07-20 NOTE — PROGRESS NOTES
Chief Complaint   Patient presents with    Leg Pain     Bilateral Hip to knee      Numbness     From knee down bilateral     Arm Pain     Feel like arms are weak         1. \"Have you been to the ER, urgent care clinic since your last visit? Hospitalized since your last visit? \" No    2. \"Have you seen or consulted any other health care providers outside of the [address] [place] [address] since your last visit? \" No     3. For patients aged 39-70: Has the patient had a colonoscopy / FIT/ Cologuard? Yes - no Care Gap present      If the patient is female:    4. For patients aged 41-77: Has the patient had a mammogram within the past 2 years? NA - based on age or sex      11. For patients aged 21-65: Has the patient had a pap smear?  NA - based on age or sex         1 most recent PHQ Screens 7/20/2022   Little interest or pleasure in doing things Several days   Feeling down, depressed, irritable, or hopeless Not at all   Total Score PHQ 2 1   Trouble falling or staying asleep, or sleeping too much Nearly every day   Feeling tired or having little energy Nearly every day   Poor appetite, weight loss, or overeating Not at all   Feeling bad about yourself - or that you are a failure or have let yourself or your family down Nearly every day   Trouble concentrating on things such as school, work, reading, or watching TV Not at all   Moving or speaking so slowly that other people could have noticed; or the opposite being so fidgety that others notice Not at all   Thoughts of being better off dead, or hurting yourself in some way Not at all   PHQ 9 Score 10   How difficult have these problems made it for you to do your work, take care of your home and get along with others Extremely difficult       Health Maintenance Due   Topic Date Due    COVID-19 Vaccine (1) Never done    Shingrix Vaccine Age 50> (1 of 2) Never done    Pneumococcal 0-64 years (2 - PCV) 10/16/2020

## 2022-07-22 LAB
25(OH)D3+25(OH)D2 SERPL-MCNC: 49.5 NG/ML (ref 30–100)
ALBUMIN SERPL ELPH-MCNC: 3.9 G/DL (ref 2.9–4.4)
ALBUMIN SERPL-MCNC: 4.5 G/DL (ref 3.8–4.8)
ALBUMIN/GLOB SERPL: 1.2 {RATIO} (ref 0.7–1.7)
ALBUMIN/GLOB SERPL: 1.7 {RATIO} (ref 1.2–2.2)
ALP SERPL-CCNC: 84 IU/L (ref 44–121)
ALPHA1 GLOB SERPL ELPH-MCNC: 0.2 G/DL (ref 0–0.4)
ALPHA2 GLOB SERPL ELPH-MCNC: 0.9 G/DL (ref 0.4–1)
ALT SERPL-CCNC: 18 IU/L (ref 0–44)
APPEARANCE UR: CLEAR
AST SERPL-CCNC: 17 IU/L (ref 0–40)
B-GLOBULIN SERPL ELPH-MCNC: 1.2 G/DL (ref 0.7–1.3)
BILIRUB SERPL-MCNC: 0.3 MG/DL (ref 0–1.2)
BILIRUB UR QL STRIP: NEGATIVE
BUN SERPL-MCNC: 18 MG/DL (ref 8–27)
BUN/CREAT SERPL: 16 (ref 10–24)
CALCIUM SERPL-MCNC: 9.5 MG/DL (ref 8.6–10.2)
CHLORIDE SERPL-SCNC: 103 MMOL/L (ref 96–106)
CK SERPL-CCNC: 199 U/L (ref 41–331)
CO2 SERPL-SCNC: 26 MMOL/L (ref 20–29)
COLOR UR: YELLOW
CREAT SERPL-MCNC: 1.13 MG/DL (ref 0.76–1.27)
EGFR: 73 ML/MIN/1.73
ERYTHROCYTE [DISTWIDTH] IN BLOOD BY AUTOMATED COUNT: 12.7 % (ref 11.6–15.4)
EST. AVERAGE GLUCOSE BLD GHB EST-MCNC: 123 MG/DL
FOLATE SERPL-MCNC: >20 NG/ML
GAMMA GLOB SERPL ELPH-MCNC: 1 G/DL (ref 0.4–1.8)
GLOBULIN SER CALC-MCNC: 2.6 G/DL (ref 1.5–4.5)
GLOBULIN SER CALC-MCNC: 3.2 G/DL (ref 2.2–3.9)
GLUCOSE SERPL-MCNC: 92 MG/DL (ref 65–99)
GLUCOSE UR QL STRIP: NEGATIVE
HBA1C MFR BLD: 5.9 % (ref 4.8–5.6)
HCT VFR BLD AUTO: 39.9 % (ref 37.5–51)
HGB BLD-MCNC: 13.9 G/DL (ref 13–17.7)
HGB UR QL STRIP: NEGATIVE
KETONES UR QL STRIP: NEGATIVE
LEUKOCYTE ESTERASE UR QL STRIP: NEGATIVE
M PROTEIN SERPL ELPH-MCNC: NORMAL G/DL
MAGNESIUM SERPL-MCNC: 2.5 MG/DL (ref 1.6–2.3)
MCH RBC QN AUTO: 33.6 PG (ref 26.6–33)
MCHC RBC AUTO-ENTMCNC: 34.8 G/DL (ref 31.5–35.7)
MCV RBC AUTO: 96 FL (ref 79–97)
MICRO URNS: NORMAL
NITRITE UR QL STRIP: NEGATIVE
PH UR STRIP: 6.5 [PH] (ref 5–7.5)
PLATELET # BLD AUTO: 254 X10E3/UL (ref 150–450)
PLEASE NOTE, 011150: NORMAL
POTASSIUM SERPL-SCNC: 5.6 MMOL/L (ref 3.5–5.2)
PROT SERPL-MCNC: 7.1 G/DL (ref 6–8.5)
PROT UR QL STRIP: NEGATIVE
RBC # BLD AUTO: 4.14 X10E6/UL (ref 4.14–5.8)
SODIUM SERPL-SCNC: 144 MMOL/L (ref 134–144)
SP GR UR STRIP: 1.02 (ref 1–1.03)
TSH SERPL DL<=0.005 MIU/L-ACNC: 1.19 UIU/ML (ref 0.45–4.5)
UROBILINOGEN UR STRIP-MCNC: 0.2 MG/DL (ref 0.2–1)
VIT B12 SERPL-MCNC: 558 PG/ML (ref 232–1245)
WBC # BLD AUTO: 9.8 X10E3/UL (ref 3.4–10.8)

## 2022-07-22 NOTE — PROGRESS NOTES
Called and discussed lab results. K+ elevated. Advised going to urgent care for recheck of potassium. They expresses agreement.

## 2022-07-25 ENCOUNTER — PATIENT MESSAGE (OUTPATIENT)
Dept: FAMILY MEDICINE CLINIC | Age: 62
End: 2022-07-25

## 2022-08-02 NOTE — TELEPHONE ENCOUNTER
Loretta Pierce 7/26/2022 10:07 AM EDT      ----- Message -----  From: Kurt   Sent: 7/25/2022 11:00 PM EDT  To: [facility] Nurse Pool  Subject: Neurologist Appt     This message is being sent by Xavier Mcdaniels on behalf of Kurt . Update on all specialist referral appointments for Dixie White:    Dr. Mariusz Garza Endocrinology - 7/26 @ 8:00 AM    Dr. Cale Bynum - 8/22 @ 3:00 PM    Dr. Ash Mccoy - 8/29 @ 10:45 AM    Dr. Pat De Los Santos - Neurologist - 11/7 @ 9:30 AM    Dixie White is on the cancelation wait list for the late August and November appointments. If you think you can advocate for him to be seen sooner by any of these specialists, that would be much appreciated. I'm really glad Dr. Bea Feng had an opening tomorrow at [address] [place] [address].    Thanks again for everything!    Misty & Kurt

## 2022-08-02 NOTE — TELEPHONE ENCOUNTER
Called and discussed further with patient. Advised calling other neurologists in the area to see if there is any earlier availability. Once he has an appointment, I can send referral to provider with earlier availability.      Keely Jernigan MD

## 2022-08-03 ENCOUNTER — OFFICE VISIT (OUTPATIENT)
Dept: FAMILY MEDICINE CLINIC | Age: 62
End: 2022-08-03
Payer: COMMERCIAL

## 2022-08-03 VITALS
RESPIRATION RATE: 18 BRPM | SYSTOLIC BLOOD PRESSURE: 104 MMHG | HEART RATE: 84 BPM | OXYGEN SATURATION: 98 % | BODY MASS INDEX: 35.41 KG/M2 | DIASTOLIC BLOOD PRESSURE: 66 MMHG | WEIGHT: 267.2 LBS | HEIGHT: 73 IN | TEMPERATURE: 97.2 F

## 2022-08-03 DIAGNOSIS — G62.9 NEUROPATHY: Primary | ICD-10-CM

## 2022-08-03 DIAGNOSIS — Z02.89 ENCOUNTER FOR COMPLETION OF FORM WITH PATIENT: ICD-10-CM

## 2022-08-03 PROCEDURE — 99213 OFFICE O/P EST LOW 20 MIN: CPT | Performed by: FAMILY MEDICINE

## 2022-08-03 RX ORDER — LORATADINE 10 MG/1
TABLET ORAL
COMMUNITY

## 2022-08-03 NOTE — PROGRESS NOTES
Ronen Fernández  58 y.o. male  1960  [address] [address]  [id number]     [address]       Encounter Date: 8/3/2022           Established Patient Visit Note: Van Crawford MD    Reason for Appointment:  Chief Complaint   Patient presents with    Form Completion     FMLA paper works    Results     Discuss lab results. History of Present Illness:  History provided by patient    Ronen Fernández is a 58 y.o. male who presents to clinic today for:     Form Completion: patient presents for [facility] for completion for neuropathy. See scanned media. See visit note from 7/20/22 for past problem history. Date of Diagnosis: 7/13/21, but patient had symptoms long before this time. Patient denies any change in his symptoms since last visit. He is concerned about the risk of a fall at work, and he does not believe that it is safe to work at this time. Review of Systems  All other ROS were reviewed and are negative except as discussed in HPI        Allergies: Cortisone (hydrocortisone) [hydrocortisone] and Zocor [simvastatin]    Medications:     Current Outpatient Medications:     ferrous sulfate (IRON) 325 mg (65 mg iron) EC tablet, Take 1 Tablet by mouth Daily (before breakfast). , Disp: 90 Tablet, Rfl: 1    ascorbate calcium (BUFFERED VITAMIN C PO), , Disp: , Rfl:     cholecalciferol (VITAMIN D3) (2,000 UNITS /50 MCG) cap capsule, , Disp: , Rfl:     GLUTATHIONE, , Disp: , Rfl:     Zinc Gluconate-Zinc Picolinate 30 mg cap, , Disp: , Rfl:     rosuvastatin (CRESTOR) 10 mg tablet, TAKE 1 TAB BY MOUTH NIGHTLY.  FOR CHOLESTEROL, Disp: 90 Tablet, Rfl: 1    vitamin B complex (B COMPLEX 1 PO), , Disp: , Rfl:     multivitamin pwpk, , Disp: , Rfl:     magnesium glycinate-mag oxide 120 mg magnesium cap, , Disp: , Rfl:     QUERCETIN DIHYDRATE, BULK,, , Disp: , Rfl:     acetylcysteine (N-ACETYL-L-CYSTEINE), , Disp: , Rfl:     ubidecarenone/vitamin E mixed (COQ10  PO), , Disp: , Rfl:     calcium carb-vitamin D3-vit K2 (Calcium Plus MenaQ7 Adult) 500 mg calcium- 200 unit-90 mcg tab, , Disp: , Rfl:     BORON, BULK,, , Disp: , Rfl:     OTHER, Take  by mouth six (6) days a week. prime secretagogue supplement, Disp: , Rfl:     albuterol (PROAIR HFA) 90 mcg/actuation inhaler, INHALE 2 INHALATIONS BY MOUTH EVERY 6 HOURS AS NEEDED FOR WHEEZING OR SHORTNESS OF BREATH, Disp: 1 Inhaler, Rfl: 1    loratadine (Claritin) 10 mg tablet, 1 tab(s), Disp: , Rfl:     potassium gluconate 550 mg (90 mg) tab, 1 tablet (Patient not taking: Reported on 8/3/2022), Disp: , Rfl:     meloxicam (MOBIC) 15 mg tablet, 1 tab(s) (Patient not taking: No sig reported), Disp: , Rfl:     bismuth subsalicylate (PEPTO-BISMOL PO), Take  by mouth as needed. (Patient not taking: No sig reported), Disp: , Rfl:     History  Patient Care Team:  Cheryl Tony MD as PCP - General (Family Medicine)  Cheryl Tony MD as PCP - [facility] Provider  Pierre Anne MD (Pulmonary Disease)    Past Medical History: he has a past medical history of Anal fissure, Arthritis, Asthma, Benign positional vertigo, Hematuria of undiagnosed cause (01/2018), Hypercholesterolemia, Hypothalamic syndrome ([address] (1969), Muscle weakness, Neuropathy, Pulmonary nodule seen on imaging study (07/12/2016), and Vitamin D deficiency. Past Surgical History: he has a past surgical history that includes hx colonoscopy (01/2018); hx orthopaedic; and hx urological (2018). Family Medical History: family history is not on file. Social History: he reports that he has never smoked. He has never used smokeless tobacco. He reports current alcohol use of about 0.8 standard drinks per week. He reports that he does not use drugs.         Objective:   Visit Vitals  /66 (BP 1 Location: Right arm, BP Patient Position: Sitting, BP Cuff Size: Adult long)   Pulse 84   Temp 97.2 °F (36.2 °C) (Temporal)   Resp 18   Ht 6' 1\" (1.854 m)   Wt 267 lb 3.2 oz (121.2 kg)   SpO2 98%   BMI 35.25 kg/m²     Wt Readings from Last 3 Encounters:   08/03/22 267 lb 3.2 oz (121.2 kg)   07/20/22 266 lb 9.6 oz (120.9 kg)   04/11/22 272 lb 9.6 oz (123.7 kg)       Physical Exam  Constitutional:       Appearance: Normal appearance. HENT:      Head: Normocephalic and atraumatic. Right Ear: External ear normal.      Left Ear: External ear normal.      Nose: Nose normal.      Mouth/Throat:      Pharynx: No oropharyngeal exudate. Eyes:      General: No scleral icterus. Right eye: No discharge. Left eye: No discharge. Conjunctiva/sclera: Conjunctivae normal.      Pupils: Pupils are equal, round, and reactive to light. Neck:      Thyroid: No thyromegaly. Vascular: No JVD. Trachea: No tracheal deviation. Cardiovascular:      Rate and Rhythm: Normal rate and regular rhythm. Heart sounds: Normal heart sounds. No murmur heard. No friction rub. No gallop. Pulmonary:      Effort: Pulmonary effort is normal. No respiratory distress. Breath sounds: Normal breath sounds. No stridor. No wheezing. Musculoskeletal:         General: No tenderness or deformity. Normal range of motion. Cervical back: Normal range of motion and neck supple. Lymphadenopathy:      Cervical: No cervical adenopathy. Skin:     General: Skin is warm and dry. Neurological:      Mental Status: He is alert and oriented to person, place, and time. Gait: Gait is intact. Assessment & Plan:      ICD-10-CM ICD-9-CM    1. Neuropathy  G62.9 355.9 REFERRAL TO PHYSICAL THERAPY      2. Encounter for completion of form with patient  Z02.89 V68.89           Form Completion for neuropathy: Completed form with patient. Will also provide referral to physical therapy. RTC 4 weeks. I have discussed the diagnosis with the patient and the intended plan as seen in the above orders. The patient has received an after-visit summary along with patient information handout.   I have discussed medication side effects and warnings with the patient as well. Disposition  Follow-up and Dispositions    Return in about 4 weeks (around 8/31/2022).            Danis Cornelius MD

## 2022-08-03 NOTE — PROGRESS NOTES
Chief Complaint   Patient presents with    Form Completion     [place] paper works     1. Have you been to the ER, urgent care clinic since your last visit? Hospitalized since your last visit? No    2. Have you seen or consulted any other health care providers outside of the [address] [place] [address] since your last visit? Include any pap smears or colon screening.  No

## 2022-08-08 ENCOUNTER — PATIENT MESSAGE (OUTPATIENT)
Dept: FAMILY MEDICINE CLINIC | Age: 62
End: 2022-08-08

## 2022-08-18 ENCOUNTER — HOSPITAL ENCOUNTER (OUTPATIENT)
Dept: PHYSICAL THERAPY | Age: 62
Discharge: HOME | End: 2022-08-18
Payer: COMMERCIAL

## 2022-08-18 DIAGNOSIS — G62.9 NEUROPATHY: Primary | ICD-10-CM

## 2022-08-18 PROCEDURE — 97161 PT EVAL LOW COMPLEX 20 MIN: CPT | Performed by: PHYSICAL THERAPIST

## 2022-08-18 NOTE — PROGRESS NOTES
[facility]  [address] [facility], [address] [place] [address]  Phone: [phone]  Fax: [phone]    Plan of Care/Statement of Necessity for Physical Therapy Services  2-15    Patient name: Antonio Marcelo  : 1960  Provider#: [id number]  Referral source: Grecia Trujillo MD      Medical/Treatment Diagnosis: Neuropathy [G62.9]     Prior Hospitalization: see medical history     Comorbidities: see evaluation  Prior Level of Function:see evaluation  Medications: Verified on Patient Summary List  Start of Care: 2022     Onset Date:see evaluation     The Plan of Care and following information is based on the information from the initial evaluation.     Assessment/ key information: Patient presents with gait/balance/strength deficits due to neuropathy and will benefit from physical therapy to address deficits noted below in problem list.   Evaluation Complexity History LOW Complexity : Zero comorbidities / personal factors that will impact the outcome / POC; Examination LOW Complexity : 1-2 Standardized tests and measures addressing body structure, function, activity limitation and / or participation in recreation  ;Presentation LOW Complexity : Stable, uncomplicated  ;Clinical Decision Making Other outcome measures clinical judgment  LOW   Overall Complexity Rating: LOW   Problem List: decrease strength, impaired gait/ balance, decrease ADL/ functional abilitiies, decrease activity tolerance, decrease flexibility/ joint mobility, and decrease transfer abilities   Treatment Plan may include any combination of the following: Therapeutic exercise, Therapeutic activities, Neuromuscular re-education, Physical agent/modality, Manual therapy, Patient education and Self Care training  Patient / Family readiness to learn indicated by: asking questions, trying to perform skills and interest  Persons(s) to be included in education: patient (P)  Barriers to Learning/Limitations: None  Patient Goal (s): please see evaluation in Connect [facility]  Patient Self Reported Health Status: please see paper chart  Rehabilitation Potential: good    Short Term Goals: To be accomplished in 5 treatments:  -Normalized gait pattern with use of either cane or rollator do dec fall risk  -Independent in HEP as evidenced on ability to perform at least 5 exercises from HEP using proper form without verbal cuing.   -Pt will report he is doing gentle cardio 2-3 days per week for overall mobility and strength. 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 Patient/ Caregiver education and instruction: self care, activity modification and exercises    [x]  Plan of care has been reviewed with PTA    Certification Period: 8/18/2022 -  11/18/22    Edmund Fletcher, PT, DPT, CMTPT      6/97/3701 5:43 PM  PT License Number: [id number]  _____________________________________________________________________    I certify that the above Therapy Services are being furnished while the patient is under my care. I agree with the treatment plan and certify that this therapy is necessary.     [de-identified] Signature:____________________  Date:____________Time:_________

## 2022-08-18 NOTE — PROGRESS NOTES
PT INITIAL EVALUATION NOTE - [facility] 2-15    Patient Name: Kyle Bragg  Date:2022  : 1960  [x]  Patient  Verified  Payor: Cade Steele / Plan: Haresh Nguyen / Product Type: HMO /    In time:310 P  Out time:400 P  Total Treatment Time (min): 40 (40 eval, 0 timed, 0 modality see below)  Total Timed Codes (min): 0   1:1 Treatment Time ([facility]/[place]): 0    Visit #:1    Treatment Area: Neuropathy [G62.9]    SUBJECTIVE  Any medication changes, allergies to medications, adverse drug reactions, diagnosis change, or new procedure performed?: [] No    [x] Yes (see summary sheet for update)  Chief complaint:     Pt with complaints of falls, decreased strength, gait, unable to do work and things around the house. Neuropathy officially diagnosed about 1 year ago, however pt reports he has been steadily declining in terms of balance and overall function for some time now. when pt was working long hours at work, he would attribute his falls to [employer] [facility] tired\" and \"working too much. \"  Mobility worsened greater in 2020 when he got COVID. Legs feel \"stiff as two by fours\"  Recently has met with podiatrist, and has several appts coming up with various specialists to address different areas of his neuropathy  Currently on FMLA, set to go back   Pt reports each time he goes to a new MD, the date keeps getting pushed back further. Pain:   5/10 max 3/10 min 3-4 burning in jero feet/10 now     Falls:>3 in the last year, 4-5 almost falls in the last week  Dizziness: none  Current assistive device: uses cane in community at times if he is in unfamiliar patient  Shoewear/orthotics: pt wearing unsupportive shoes  Social: , has two sons  Most difficult tasks: walking, standing, getting off ground, squatting, bending over, climbing ladders, stairs, getting out of bed (hard to get legs out of the heavy comforter. )  Tests/injections:   Xray from 2018 shows:   IMPRESSION:    1.  No acute fracture in the lumbar spine.  2. Lower thoracic spine vertebral body volume loss is most likely chronic or  congenital.  3. Mild degenerative disc disease is greatest at L4-5. 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 Prior level of function/activity level: Able to walk with greater ease, squat down, bend over, climb ladders. Social: Lives with wife. Two sons. 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 Doesn't do a lot of the physical work mostly managing at this time. Exercises: currently does not belong to [employer], wife belongs to [facility] [employer] he can join on with her if he wishes  Patient goal: \"improve function. Not fall too much. \"  PMH:  jero THR, prior lumbar pain, high cholesterol, pre-diabetes,     OBJECTIVE    Observation: forward trunk lean throughout    Ambulation: Pt ambulates, touching door frames, edge of bed, etc for additional support. Excessive knee extension at initial contact jero right>left. Difficulty with turns, seems unsteady. Modified CTSIB test for balance (all Rhomberg, arms crossed)  Condition 1 (ground, EO): 10 sec, sig LOB in posterior direction  Condition 2 (ground, EC): NT  Condition 3 (foam, EO):NT  Condition 4 (foam, EC): NT      Tandem balance:   (R): 10 sec  (L): 4 sec    30 sec sit to stands: 5 times from standard chair. Use of jero UE for ascent and decent.     Stairs: reciprocal pattern to ascend, step to to lower     MMT:       (R)      (L)  Knee Flexion 4 4    Extension 4 4   Ankle Plantarflexion 2 2    Dorsiflexion 3 3       OBJECTIVE  [x] Skin assessment post-treatment:  [x]intact []redness- no adverse reaction    []redness - adverse reaction:           With   [x] eval   []manual   [] self care    Patient Education: [x] Review HEP    [] Progressed/Changed HEP based on:   [] positioning   [] body mechanics   [] transfers   [x] other:  on role of PT in helping to manage neuropathy symptoms ( increase gait/balance/strength)  Advised bring cane to next session for assessment  Advised pt join gym, trial of recument bike 10 min every other day for overall mobility and strength. Pain Level (0-10 scale) post treatment: 3    ASSESSMENT/Changes in Function:     [x]  See Plan of Brain.  ROMA Fletcher, TABBYT, SAUDPT  PT License Number: [id number]   8/18/2022  2:43 PM

## 2022-08-22 ENCOUNTER — OFFICE VISIT (OUTPATIENT)
Dept: CARDIOLOGY CLINIC | Age: 62
End: 2022-08-22
Payer: COMMERCIAL

## 2022-08-22 VITALS
SYSTOLIC BLOOD PRESSURE: 112 MMHG | HEIGHT: 73 IN | RESPIRATION RATE: 22 BRPM | WEIGHT: 268 LBS | DIASTOLIC BLOOD PRESSURE: 72 MMHG | HEART RATE: 94 BPM | OXYGEN SATURATION: 96 % | BODY MASS INDEX: 35.52 KG/M2

## 2022-08-22 DIAGNOSIS — I73.9 CLAUDICATION (HCC): Primary | ICD-10-CM

## 2022-08-22 DIAGNOSIS — G57.93 NEUROPATHY INVOLVING BOTH LOWER EXTREMITIES: ICD-10-CM

## 2022-08-22 PROCEDURE — 93000 ELECTROCARDIOGRAM COMPLETE: CPT | Performed by: INTERNAL MEDICINE

## 2022-08-22 PROCEDURE — 99204 OFFICE O/P NEW MOD 45 MIN: CPT | Performed by: INTERNAL MEDICINE

## 2022-08-22 RX ORDER — CHOLECALCIFEROL TAB 125 MCG (5000 UNIT) 125 MCG
5000 TAB ORAL DAILY
COMMUNITY
Start: 2022-04-11

## 2022-08-22 NOTE — PROGRESS NOTES
[address]: Melony Hernandez  [phone]    History of Present Illness:  Mr. 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 Milton Monet for cardiac evaluation. He is here with his wife, who is a patient of mine. He apparently was diagnosed with neuropathy back in 07/2021. This has been quite debilitating and now if he goes on his knees to the floor, he cannot get back up. Sometimes his legs will \"have a mind of their own\". He has seen various providers, including neurologist who did diagnose him with neuropathy, as well as his podiatrist.  Some of his symptoms do happen when he is walking and exerting himself. His doctors did want him to make sure that this was not coming from issues with circulation. He denies any exertional chest pain. His breathing has been stable. No significant palpitations, lightheadedness or dizziness. He is compensated on exam with clear lungs and just trace lower extremity edema. His EKG here was normal sinus rhythm, heart rate of 94, RSR in V1. Assessment and Plan:   1. Claudication. Will obtain ABIs for further evaluation. If this is unrevealing, he will follow up with neurology and podiatry for his neuropathy. 2. 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 3. Mixed hyperlipidemia. Tolerating statin. Soc hx. No tobacco  Fam hx. Dad with CAD, tobacco use. 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 All other systems negative except as above.      PE  Vitals:    08/22/22 1514   BP: 112/72   Pulse: 94   Resp: 22   SpO2: 96% Weight: 268 lb (121.6 kg)   Height: 6' 1\" (1.854 m)    Body mass index is 35.36 kg/m².   General appearance - alert, well appearing, and in no distress  Mental status - affect appropriate to mood  Eyes - sclera anicteric, moist mucous membranes  Neck - supple, no JVD  Chest - clear to auscultation, no wheezes, rales or rhonchi  Heart - normal rate, regular rhythm, normal S1, S2, no murmurs, rubs, clicks or gallops  Abdomen - soft, nontender, nondistended, no masses or organomegaly  Back exam - full range of motion, no tenderness  Neurological - no focal deficits  Extremities - peripheral pulses normal, trace pedal edema      Recent Labs:  Lab Results   Component Value Date/Time    Cholesterol, total 121 04/11/2022 12:00 AM    HDL Cholesterol 43 04/11/2022 12:00 AM    LDL, calculated 57 04/11/2022 12:00 AM    LDL, calculated 60 11/26/2021 12:28 PM    Triglyceride 116 04/11/2022 12:00 AM    CHOL/HDL Ratio 2.8 11/26/2021 12:28 PM     Lab Results   Component Value Date/Time    Creatinine 1.13 07/20/2022 12:00 AM     Lab Results   Component Value Date/Time    BUN 18 07/20/2022 12:00 AM     Lab Results   Component Value Date/Time    Potassium 5.6 (H) 07/20/2022 12:00 AM     Lab Results   Component Value Date/Time    Hemoglobin A1c 5.9 (H) 07/20/2022 12:00 AM     Lab Results   Component Value Date/Time    HGB 13.9 07/20/2022 12:00 AM     Lab Results   Component Value Date/Time    PLATELET 499 77/36/1807 12:00 AM       Reviewed:  Past Medical History:   Diagnosis Date    Anal fissure     Arthritis     Asthma     Benign positional vertigo     Hematuria of undiagnosed cause 01/2018    Hypercholesterolemia     Hypothalamic syndrome ([address] 1969    s/t TBI, treated with growth hormone    Muscle weakness     Neuropathy     Pulmonary nodule seen on imaging study 07/12/2016    4 mm bilaterally, repeat CT in 1 year    Vitamin D deficiency      Social History     Tobacco Use   Smoking Status Never   Smokeless Tobacco Never     Social History     Substance and Sexual Activity   Alcohol Use Yes    Alcohol/week: 7.0 standard drinks    Types: 7 Cans of beer per week    Comment: 1 every night     Allergies   Allergen Reactions    Cortisone (Hydrocortisone) [Hydrocortisone] Herpetiformis Dermatitis     Reaction with eyes    Zocor [Simvastatin] Other (comments)     Bilateral leg cramps       Current Outpatient Medications   Medication Sig    cholecalciferol (VITAMIN D3) (5000 Units/125 mcg) tab tablet Take 5,000 Units by mouth daily. 7000 units daily    B6/mfolate/mcobal/ALA/benfotia (EB-N6 DR PO)     loratadine (CLARITIN) 10 mg tablet 1 tab(s)    ferrous sulfate (IRON) 325 mg (65 mg iron) EC tablet Take 1 Tablet by mouth Daily (before breakfast). ascorbate calcium (BUFFERED VITAMIN C PO)     cholecalciferol (VITAMIN D3) (2,000 UNITS /50 MCG) cap capsule     GLUTATHIONE     meloxicam (MOBIC) 15 mg tablet 1 tab(s)    Zinc Gluconate-Zinc Picolinate 30 mg cap     rosuvastatin (CRESTOR) 10 mg tablet TAKE 1 TAB BY MOUTH NIGHTLY. FOR CHOLESTEROL    multivitamin pwpk     magnesium glycinate-mag oxide 120 mg magnesium cap     QUERCETIN DIHYDRATE, BULK,     acetylcysteine (N-ACETYL-L-CYSTEINE)     ubidecarenone/vitamin E mixed (COQ10  PO)     calcium carb-vitamin D3-vit K2 (Calcium Plus MenaQ7 Adult) 500 mg calcium- 200 unit-90 mcg tab     BORON, BULK,     OTHER Take  by mouth six (6) days a week. prime secretagogue supplement    albuterol (PROAIR HFA) 90 mcg/actuation inhaler INHALE 2 INHALATIONS BY MOUTH EVERY 6 HOURS AS NEEDED FOR WHEEZING OR SHORTNESS OF BREATH    potassium gluconate 550 mg (90 mg) tab 1 tablet (Patient not taking: No sig reported)    vitamin B complex (B COMPLEX 1 PO)  (Patient not taking: Reported on 8/22/2022)    bismuth subsalicylate (PEPTO-BISMOL PO) Take  by mouth as needed. (Patient not taking: No sig reported)     No current facility-administered medications for this visit.        Keisha Lyons MD  [facility] [place]  [facility] [address], [address]

## 2022-08-25 ENCOUNTER — HOSPITAL ENCOUNTER (OUTPATIENT)
Dept: PHYSICAL THERAPY | Age: 62
Discharge: HOME | End: 2022-08-25
Payer: COMMERCIAL

## 2022-08-25 ENCOUNTER — ANCILLARY PROCEDURE (OUTPATIENT)
Dept: CARDIOLOGY CLINIC | Age: 62
End: 2022-08-25
Payer: COMMERCIAL

## 2022-08-25 PROCEDURE — 93922 UPR/L XTREMITY ART 2 LEVELS: CPT | Performed by: SPECIALIST

## 2022-08-25 PROCEDURE — 97110 THERAPEUTIC EXERCISES: CPT | Performed by: PHYSICAL THERAPIST

## 2022-08-25 PROCEDURE — 97112 NEUROMUSCULAR REEDUCATION: CPT | Performed by: PHYSICAL THERAPIST

## 2022-08-25 NOTE — PROGRESS NOTES
PT DAILY TREATMENT NOTE - [facility] 2-15    Patient Name: Bill Mackenzie  Date:2022  : 1960  [x]  Patient  Verified  Payor: Kalani Cade / Plan: [employer] / Product Type: HMO /    In time:235 P  Out time:335 P  Total Treatment Time (min): 60  Total Timed Codes (min): 60  1:1 Treatment Time ([facility]/[place] only): 60   Visit #:  2    Treatment Area: Neuropathy [G62.9]    SUBJECTIVE  Pain Level (0-10 scale): 3  Any medication changes, allergies to medications, adverse drug reactions, diagnosis change, or new procedure performed?: [x] No    [] Yes (see summary sheet for update)  Subjective functional status/changes:     Has been considering getting a bike at home    OBJECTIVE      [x] Skin assessment post-treatment:  [x]intact []redness- no adverse reaction    []redness - adverse reaction:     35 min Therapeutic Exercise:  [x] See flow sheet :   Rationale: increase strength, improve coordination, improve balance, and increase proprioception to improve the patients ability to perform ADL's      25 min Neuromuscular Re-education:  -  See flow sheet :   Rationale: improve balance and increase proprioception  to improve the patients ability to walk. With   [] TE   [] manual Patient Education: [x] Review HEP    [] Progressed/Changed HEP based on:   [] positioning   [] body mechanics   [] transfers   [] heat/ice application    [x] other:Cane appropriate, advised [employer] for more supportive shoes. Reminded pt to look into home bike, try [employer].       Other Objective/Functional Measures:   TU sec using cane from standard chair      Pain Level (0-10 scale) post treatment: 3    ASSESSMENT/Changes in Function:     Patient will continue to benefit from skilled PT services to modify and progress therapeutic interventions, address functional mobility deficits, address strength deficits, analyze and address soft tissue restrictions, analyze and cue movement patterns, and analyze and modify body mechanics/ergonomics to attain remaining goals. Progress towards goals / Updated goals: Had to perform some modifications today due to dec ROM in LE and pain in right hip, but overall ayana session well. Difficulty \"feeling\" some stretches due to neuropathy. PLAN  []  Upgrade activities as tolerated     []  Continue plan of care  []  Update interventions per flow sheet       []  Discharge due to:_  []  Other:_      John Fletcher, PT 8/25/2022

## 2022-08-26 LAB
IMMEDIATE LEFT ABI: 1.24
IMMEDIATE RIGHT ABI: 1.19
LEFT ABI PERO ATA: 1.22
LEFT ABI: 1.25
LEFT ANTERIOR TIBIAL: 161 MMHG
LEFT ARM BP: 124 MMHG
LEFT POSTERIOR TIBIAL: 165 MMHG
RIGHT ABI: 1.17
RIGHT ARM BP: 132 MMHG
RIGHT POSTERIOR TIBIAL: 155 MMHG
VAS IMMEDIATE LEFT POST TIBIAL BP: 175 MMHG
VAS IMMEDIATE RIGHT BRACHIAL BP: 141 MMHG
VAS IMMEDIATE RIGHT POST TIBIAL BP: 168 MMHG
VAS RIGHT DORSALIS PEDIS BP: 148 MMHG

## 2022-08-26 NOTE — TELEPHONE ENCOUNTER
Called and discussed further with patient. He reports that he is doing physical therapy. He reports that he saw cardiology who checked circulation in his legs. He also reports that he saw his podiatrist. He reports that he was previously evaluated by neurology for his diagnosis of neuropathy.  Advised scheduling follow up with his original neurologist.     Marino Wallace MD

## 2022-08-26 NOTE — TELEPHONE ENCOUNTER
Barron Mistry 8/8/2022 4:58 PM EDT      ----- Message -----  From: Geovanni Hood  Sent: 8/8/2022 4:23 PM EDT  [facility]  Subject: Neurologist Referral     This message is being sent by Jordan Romano on behalf of Geovanni Hood. Greetings, Dr. Helena Navarrete called both [facility] and [address] [place] [address] neurology practices, but had no success with getting an appointment sooner than the Nov 7th with Dr. Lobo Todd. I was able to get Lowell Hopkins registered with [facility]. They asked to have you fax Dominic's medical records and a referral \"specified as *urgent*\" to their fax # [phone]. They will then call me to set up an appointment - hopefully before the 20th. Our situation is very time sensitive given that his current 4 week FMLA leave ends on 8/20. Dane Alejandro is threatening to fire him. We are very concerned this will happen if he returns after the 20th. He remains a fall risk and is not safe in his work setting. If he gets fired he will lose his health insurance coverage. Anything you can do to help us would be greatly appreciated. I am also waiting for a call-back from Dr. Bishnu Silva, [facility] podiatrist, in hopes she can see him before the 20th and provide the needed info for extending his FMLA leave and submitting his claim for [employer]. Thank you for all your help! Let us know if there is anything else we need to do.     Take care,  Misty Burns

## 2022-08-30 ENCOUNTER — HOSPITAL ENCOUNTER (OUTPATIENT)
Dept: PHYSICAL THERAPY | Age: 62
Discharge: HOME | End: 2022-08-30
Payer: COMMERCIAL

## 2022-08-30 PROCEDURE — 97110 THERAPEUTIC EXERCISES: CPT | Performed by: PHYSICAL THERAPIST

## 2022-08-30 PROCEDURE — 97112 NEUROMUSCULAR REEDUCATION: CPT | Performed by: PHYSICAL THERAPIST

## 2022-08-30 NOTE — PROGRESS NOTES
PT DAILY TREATMENT NOTE - [facility] 2-15    Patient Name: Kurt   Date:2022  : 1960  [x]  Patient  Verified  Payor: Nicole Del Rosario / Plan: Jorden Jacobson / Product Type: HMO /    In time:1130 A Out time:1215 P  Total Treatment Time (min): 45  Total Timed Codes (min): 45  1:1 Treatment Time ([facility]/[place] only):30  Visit #:  3    Treatment Area: Neuropathy [G62.9]    SUBJECTIVE  Pain Level (0-10 scale): 3  Any medication changes, allergies to medications, adverse drug reactions, diagnosis change, or new procedure performed?: [x] No    [] Yes (see summary sheet for update)  Subjective functional status/changes:     Calves felt fatigued after last time. OBJECTIVE      [x] Skin assessment post-treatment:  [x]intact []redness- no adverse reaction    []redness - adverse reaction:     30 min Therapeutic Exercise:  [x] See flow sheet :   Rationale: increase strength, improve coordination, improve balance, and increase proprioception to improve the patients ability to perform ADL's      15 min Neuromuscular Re-education:  -  See flow sheet :   Rationale: improve balance and increase proprioception  to improve the patients ability to walk. With   [] TE   [] manual Patient Education: [x] Review HEP    [] Progressed/Changed HEP based on:   [] positioning   [] body mechanics   [] transfers   [] heat/ice application    [x] other:Cane appropriate, advised [employer] for more supportive shoes. Other Objective/Functional Measures:         Pain Level (0-10 scale) post treatment: 3    ASSESSMENT/Changes in Function:     Patient will continue to benefit from skilled PT services to modify and progress therapeutic interventions, address functional mobility deficits, address strength deficits, analyze and address soft tissue restrictions, analyze and cue movement patterns, and analyze and modify body mechanics/ergonomics to attain remaining goals.             Progress towards goals / Updated goals:  Pt able to perform step ups bilaterally today without right hip pain. Sit to stand, unable from elevated table without jero UE assist to ascend and also jero UE assist to sit. PLAN  []  Upgrade activities as tolerated     []  Continue plan of care  []  Update interventions per flow sheet       []  Discharge due to:_  []  Other:_      David Koch.  Chance, PT 8/30/2022

## 2022-08-31 ENCOUNTER — OFFICE VISIT (OUTPATIENT)
Dept: FAMILY MEDICINE CLINIC | Age: 62
End: 2022-08-31
Payer: COMMERCIAL

## 2022-08-31 VITALS
TEMPERATURE: 98.4 F | RESPIRATION RATE: 16 BRPM | WEIGHT: 271 LBS | HEART RATE: 73 BPM | HEIGHT: 73 IN | DIASTOLIC BLOOD PRESSURE: 64 MMHG | BODY MASS INDEX: 35.92 KG/M2 | OXYGEN SATURATION: 97 % | SYSTOLIC BLOOD PRESSURE: 111 MMHG

## 2022-08-31 DIAGNOSIS — R26.89 IMBALANCE: ICD-10-CM

## 2022-08-31 DIAGNOSIS — G62.9 NEUROPATHY: Primary | ICD-10-CM

## 2022-08-31 DIAGNOSIS — R29.898 WEAKNESS OF BOTH LOWER EXTREMITIES: ICD-10-CM

## 2022-08-31 PROCEDURE — 99213 OFFICE O/P EST LOW 20 MIN: CPT | Performed by: FAMILY MEDICINE

## 2022-08-31 NOTE — PROGRESS NOTES
Chief Complaint   Patient presents with    Other     Discuss about neuropathy symptoms     1. Have you been to the ER, urgent care clinic since your last visit? Hospitalized since your last visit? No    2. Have you seen or consulted any other health care providers outside of the [address] [place] [address] since your last visit? Include any pap smears or colon screening.  No

## 2022-08-31 NOTE — PROGRESS NOTES
Saranya Mead  58 y.o. male  1960  [facility] [id number]  [id number]     [address]       Encounter Date: 8/31/2022           Established Patient Visit Note: Vaishali Higgins MD    Reason for Appointment:  Chief Complaint   Patient presents with    Other     Discuss about neuropathy symptoms         History of Present Illness:  History provided by patient    Saranya Mead is a 58 y.o. male who presents to clinic today for:     Polyneuropathy, instability/weakness:   Work Leave Information: Michele's last day of work was 7/19/22. He is unable to work at this time due to his polyneuropathy and instability/weakness that increases his risk for accident and workplace injury. Specialist  Podiatry: his disability/FMLA is currently being managed by podiatry (Dr. Rosie Ojeda). She is also managing his polyneuropathy. Continued recommendations of out of work status: Patient is continued out of work d/t increased risk with going back. He reports that he attempted to discuss light duty, but they made it clear that it responsibilities were not abled to be modified and he would need to be able to bend, lift, squat, kneel, and climb to be able to work. Current signs, symptoms, and behaviors related to condition: Patient continues to have poor balance, weakness, and impaired sensation of bilateral lower extremities. How condition prevents patient from performing job duties: The condition prevents him from performing his duties in that it is unsafe for him to perform the actions required for his employment with accident (see required actions above). Frequency, duration, and severity of symptoms: Daily, persistent  Treamtent Plan: he continuing physical therapy and following up with podiatry for vitamin supplementation (EB-N6) to target neuropathy symtpoms. They are also considering medications that can help to target his neuropathy symptoms.  He is also wearing compression stockings. Laboratory Evaluation: B12/folate wnl, TSH wnl, protein electrophoresis wnl, A1c in prediabetes range        Review of Systems  All other ROS were reviewed and are negative except as discussed in HPI        Allergies: Cortisone (hydrocortisone) [hydrocortisone] and Zocor [simvastatin]    Medications:     Current Outpatient Medications:     cholecalciferol (VITAMIN D3) (5000 Units/125 mcg) tab tablet, Take 5,000 Units by mouth daily. 7000 units daily, Disp: , Rfl:     B6/mfolate/mcobal/ALA/benfotia (EB-N6 DR PO), , Disp: , Rfl:     loratadine (CLARITIN) 10 mg tablet, 1 tab(s), Disp: , Rfl:     ferrous sulfate (IRON) 325 mg (65 mg iron) EC tablet, Take 1 Tablet by mouth Daily (before breakfast). , Disp: 90 Tablet, Rfl: 1    ascorbate calcium (BUFFERED VITAMIN C PO), , Disp: , Rfl:     cholecalciferol (VITAMIN D3) (2,000 UNITS /50 MCG) cap capsule, , Disp: , Rfl:     GLUTATHIONE, , Disp: , Rfl:     meloxicam (MOBIC) 15 mg tablet, 1 tab(s), Disp: , Rfl:     Zinc Gluconate-Zinc Picolinate 30 mg cap, , Disp: , Rfl:     rosuvastatin (CRESTOR) 10 mg tablet, TAKE 1 TAB BY MOUTH NIGHTLY. FOR CHOLESTEROL, Disp: 90 Tablet, Rfl: 1    multivitamin pwpk, , Disp: , Rfl:     magnesium glycinate-mag oxide 120 mg magnesium cap, , Disp: , Rfl:     QUERCETIN DIHYDRATE, BULK,, , Disp: , Rfl:     acetylcysteine (N-ACETYL-L-CYSTEINE), , Disp: , Rfl:     ubidecarenone/vitamin E mixed (COQ10  PO), , Disp: , Rfl:     calcium carb-vitamin D3-vit K2 (Calcium Plus MenaQ7 Adult) 500 mg calcium- 200 unit-90 mcg tab, , Disp: , Rfl:     BORON, BULK,, , Disp: , Rfl:     OTHER, Take  by mouth six (6) days a week.  prime secretagogue supplement, Disp: , Rfl:     albuterol (PROAIR HFA) 90 mcg/actuation inhaler, INHALE 2 INHALATIONS BY MOUTH EVERY 6 HOURS AS NEEDED FOR WHEEZING OR SHORTNESS OF BREATH, Disp: 1 Inhaler, Rfl: 1    potassium gluconate 550 mg (90 mg) tab, 1 tablet (Patient not taking: No sig reported), Disp: , Rfl: vitamin B complex (B COMPLEX 1 PO), , Disp: , Rfl:     bismuth subsalicylate (PEPTO-BISMOL PO), Take  by mouth as needed. (Patient not taking: No sig reported), Disp: , Rfl:     History  Patient Care Team:  Kory Pereira MD as PCP - General (Family Medicine)  Kory Pereira MD as PCP - [facility]  Wilbur Weston MD (Pulmonary Disease)  Dwana Romberg, MD (Cardiovascular Disease Physician)    Past Medical History: he has a past medical history of Anal fissure, Arthritis, Asthma, Benign positional vertigo, Hematuria of undiagnosed cause (01/2018), Hypercholesterolemia, Hypothalamic syndrome ([facility] [address] (1969), Muscle weakness, Neuropathy, Pulmonary nodule seen on imaging study (07/12/2016), and Vitamin D deficiency. Past Surgical History: he has a past surgical history that includes hx colonoscopy (01/2018); hx orthopaedic; and hx urological (2018). Family Medical History: family history is not on file. Social History: he reports that he has never smoked. He has never used smokeless tobacco. He reports current alcohol use of about 7.0 standard drinks per week. He reports that he does not use drugs. Objective:   Visit Vitals  /64 (BP 1 Location: Left arm, BP Patient Position: Sitting, BP Cuff Size: Adult long)   Pulse 73   Temp 98.4 °F (36.9 °C) (Temporal)   Resp 16   Ht 6' 1\" (1.854 m)   Wt 271 lb (122.9 kg)   SpO2 97%   BMI 35.75 kg/m²     Wt Readings from Last 3 Encounters:   08/31/22 271 lb (122.9 kg)   08/22/22 268 lb (121.6 kg)   08/03/22 267 lb 3.2 oz (121.2 kg)       Physical Exam  Constitutional:       Appearance: Normal appearance. HENT:      Head: Normocephalic and atraumatic. Right Ear: External ear normal.      Left Ear: External ear normal.      Nose: Nose normal.      Mouth/Throat:      Pharynx: No oropharyngeal exudate. Eyes:      General: No scleral icterus. Right eye: No discharge. Left eye: No discharge.       Conjunctiva/sclera: Conjunctivae normal.      Pupils: Pupils are equal, round, and reactive to light. Neck:      Thyroid: No thyromegaly. Vascular: No JVD. Trachea: No tracheal deviation. Cardiovascular:      Rate and Rhythm: Normal rate and regular rhythm. Heart sounds: Normal heart sounds. No murmur heard. No friction rub. No gallop. Pulmonary:      Effort: Pulmonary effort is normal. No respiratory distress. Breath sounds: Normal breath sounds. No stridor. No wheezing. Musculoskeletal:         General: No tenderness or deformity. Normal range of motion. Cervical back: Normal range of motion and neck supple. Lymphadenopathy:      Cervical: No cervical adenopathy. Skin:     General: Skin is warm and dry. Neurological:      Mental Status: He is alert and oriented to person, place, and time. Cranial Nerves: No cranial nerve deficit. Motor: Weakness (lower legs) present. Coordination: Coordination abnormal.      Gait: Gait abnormal.      Deep Tendon Reflexes: Reflexes normal.         Assessment & Plan:      ICD-10-CM ICD-9-CM    1. Neuropathy  G62.9 355.9 REFERRAL TO NEUROLOGY      XR SPINE LUMB 2 OR 3 V      2. Imbalance  R26.89 781.2 REFERRAL TO NEUROLOGY      XR SPINE LUMB 2 OR 3 V      3. Weakness of both lower extremities  R29.898 729.89 REFERRAL TO NEUROLOGY      XR SPINE LUMB 2 OR 3 V          Neuropathy: Chronic, uncontrolled. Patient's signs  and symptoms  have a significant impact on his ability to function in his job duties and daily living activities. Work Leave Information: He will continue to remain out from work through 9/26/22 pending reevaluation of symptoms  Continued recommendations of out of work status: It is recommended that he remain off from work thorugh 9/26/22 pending reevaluation at that time. Treatment plan: continue to follow up with podiatry. Working to schedule visit with neurology and rheumatology. Obtain imaging as above.    Return to work plan: Michele is scheduled to return to work on 9/26/22 provided his symptoms improve and allow him to return to work safely. However, if his symptoms do not improve and it remains unsafe for him to return to work, his leave may need to be extended for additional time. I have discussed the diagnosis with the patient and the intended plan as seen in the above orders. The patient has received an after-visit summary along with patient information handout. I have discussed medication side effects and warnings with the patient as well. Disposition  Follow-up and Dispositions    Return in about 4 weeks (around 9/28/2022).            Alexandro Cheung MD

## 2022-09-01 ENCOUNTER — HOSPITAL ENCOUNTER (OUTPATIENT)
Dept: PHYSICAL THERAPY | Age: 62
Discharge: HOME | End: 2022-09-01
Payer: COMMERCIAL

## 2022-09-01 ENCOUNTER — TELEPHONE (OUTPATIENT)
Dept: CARDIOLOGY CLINIC | Age: 62
End: 2022-09-01

## 2022-09-01 PROCEDURE — 97110 THERAPEUTIC EXERCISES: CPT | Performed by: PHYSICAL THERAPIST

## 2022-09-01 PROCEDURE — 97112 NEUROMUSCULAR REEDUCATION: CPT | Performed by: PHYSICAL THERAPIST

## 2022-09-01 NOTE — TELEPHONE ENCOUNTER
MD Taylor Kerr, EDWIN  Please let pt know ABIs were normal. Thx    Two patient identifiers verified. Per MD patient called and given resultsPatient verbalized understanding and denies any further questions or concerns at this time.      Future Appointments   Date Time Provider Nicholas Robbins   9/12/2022 10:30 AM Reji Fletcher., PT [facility] [place] [facility]   9/19/2022 10:30 AM Reji Fletcher., PT [facility] [place] [facility]   9/26/2022 10:30 AM Reji Fletcher., PT [facility] [place] [facility]   10/3/2022 10:30 AM Reji Fletcher., PT [facility] [place]   10/4/2022 10:40 AM Faith Recinos MD [facility] BS AMB   11/7/2022 10:00 AM Roscoe Duenas MD [facility] BS AMB

## 2022-09-01 NOTE — PROGRESS NOTES
PT DAILY TREATMENT NOTE - [facility] 2-15    Patient Name: Ronen Fernández  Date:2022  : 1960  [x]  Patient  Verified  Payor: Gonzalez Guard / Plan: Anuj Cantu / Product Type: HMO /    In time:1130 A Out time:1210  Total Treatment Time (min): 40  Total Timed Codes (min): 40  1:1 Treatment Time ([facility]/[place] only):30  Visit #:  4    Treatment Area: Neuropathy [G62.9]    SUBJECTIVE  Pain Level (0-10 scale): 3  Any medication changes, allergies to medications, adverse drug reactions, diagnosis change, or new procedure performed?: [x] No    [] Yes (see summary sheet for update)  Subjective functional status/changes:     Pt was in the store last night and he all the sudden felt like he ran out of steam and had to sit down. There wasn't any warning, \"hit all of the sudden. \"    OBJECTIVE      [x] Skin assessment post-treatment:  [x]intact []redness- no adverse reaction    []redness - adverse reaction:     25 min Therapeutic Exercise:  [x] See flow sheet :   Rationale: increase strength, improve coordination, improve balance, and increase proprioception to improve the patients ability to perform ADL's      15 min Neuromuscular Re-education:  -  See flow sheet :   Rationale: improve balance and increase proprioception  to improve the patients ability to walk. With   [] TE   [] manual Patient Education: [x] Review HEP    [] Progressed/Changed HEP based on:   [] positioning   [] body mechanics   [] transfers   [] heat/ice application    [] other:     Other Objective/Functional Measures:         Pain Level (0-10 scale) post treatment: 3    ASSESSMENT/Changes in Function:     Patient will continue to benefit from skilled PT services to modify and progress therapeutic interventions, address functional mobility deficits, address strength deficits, analyze and address soft tissue restrictions, analyze and cue movement patterns, and analyze and modify body mechanics/ergonomics to attain remaining goals. Progress towards goals / Updated goals:  Right hip pain olivera progression of strength exercises. PLAN  []  Upgrade activities as tolerated     []  Continue plan of care  []  Update interventions per flow sheet       []  Discharge due to:_  []  Other:_      Alisha Fletcher, PT 9/1/2022

## 2022-09-12 ENCOUNTER — HOSPITAL ENCOUNTER (OUTPATIENT)
Dept: PHYSICAL THERAPY | Age: 62
Discharge: HOME | End: 2022-09-12
Payer: COMMERCIAL

## 2022-09-12 PROCEDURE — 97110 THERAPEUTIC EXERCISES: CPT | Performed by: PHYSICAL THERAPIST

## 2022-09-12 NOTE — PROGRESS NOTES
PT DAILY TREATMENT NOTE - [facility] 2-15    Patient Name: Siria George  Date:2022  : 1960  [x]  Patient  Verified  Payor: Nasim Byrd / Plan: Daisy Rodriguez / Product Type: HMO /    In time:1030 A  Out time:1125 A  Total Treatment Time (min): 55  Total Timed Codes (min): 55  1:1 Treatment Time ([facility]/[place] only):40  Visit #:  5    Treatment Area: Neuropathy [G62.9]    SUBJECTIVE  Pain Level (0-10 scale): stiff. Any medication changes, allergies to medications, adverse drug reactions, diagnosis change, or new procedure performed?: [x] No    [] Yes (see summary sheet for update)  Subjective functional status/changes:     Pt  doing okay, still not feeling fatigue or anything his right side, not feeling the same like he would on his left side. Looking forward to his Rheumatology appt that is coming up soon. OBJECTIVE      [x] Skin assessment post-treatment:  [x]intact []redness- no adverse reaction    []redness - adverse reaction:     55 min Therapeutic Exercise:  [x] See flow sheet :   Rationale: increase strength, improve coordination, improve balance, and increase proprioception to improve the patients ability to perform ADL's      0 min Neuromuscular Re-education:  -  See flow sheet :   Rationale: improve balance and increase proprioception  to improve the patients ability to walk.      With   [] TE   [] manual Patient Education: [x] Review HEP    [] Progressed/Changed HEP based on:   [] positioning   [] body mechanics   [] transfers   [] heat/ice application    [] other:     Other Objective/Functional Measures:         Pain Level (0-10 scale) post treatment: 3    ASSESSMENT/Changes in Function:     Patient will continue to benefit from skilled PT services to modify and progress therapeutic interventions, address functional mobility deficits, address strength deficits, analyze and address soft tissue restrictions, analyze and cue movement patterns, and analyze and modify body mechanics/ergonomics to attain remaining goals. Progress towards goals / Updated goals:  Improved eccentric control noted during sit to stand exercise today. Pt still requires use of jero UE on table for ascent and descent. PLAN  []  Upgrade activities as tolerated     []  Continue plan of care  []  Update interventions per flow sheet       []  Discharge due to:_  []  Other:_      Molina Fletcher, PT 9/12/2022

## 2022-09-19 ENCOUNTER — HOSPITAL ENCOUNTER (OUTPATIENT)
Dept: PHYSICAL THERAPY | Age: 62
Discharge: HOME | End: 2022-09-19
Payer: COMMERCIAL

## 2022-09-19 DIAGNOSIS — E78.00 HYPERCHOLESTEROLEMIA: Chronic | ICD-10-CM

## 2022-09-19 PROCEDURE — 97110 THERAPEUTIC EXERCISES: CPT | Performed by: PHYSICAL THERAPIST

## 2022-09-19 RX ORDER — ROSUVASTATIN CALCIUM 10 MG/1
TABLET, COATED ORAL
Qty: 90 TABLET | Refills: 1 | Status: SHIPPED | OUTPATIENT
Start: 2022-09-19

## 2022-09-19 NOTE — PROGRESS NOTES
PT DAILY TREATMENT NOTE - [facility] 2-15    Patient Name: Pablo Nolasco  Date:2022  : 1960  [x]  Patient  Verified  Payor: BLUE CROSS / Plan: Ray Foreman / Product Type: HMO /    In time:1030 A  Out time:1120 A   Total Treatment Time (min): 50  Total Timed Codes (min): 40  1:1 Treatment Time ([facility]/[place] only):25  Visit #:  6    Treatment Area: Neuropathy [G62.9]    SUBJECTIVE  Pain Level (0-10 scale): s6  Any medication changes, allergies to medications, adverse drug reactions, diagnosis change, or new procedure performed?: [x] No    [] Yes (see summary sheet for update)  Subjective functional status/changes:     Pt  flared up his back over the weekend. OBJECTIVE      [x] Skin assessment post-treatment:  [x]intact []redness- no adverse reaction    []redness - adverse reaction:     40 min Therapeutic Exercise:  [x] See flow sheet :   Rationale: increase strength, improve coordination, improve balance, and increase proprioception to improve the patients ability to perform ADL's      0 min Neuromuscular Re-education:  -  See flow sheet :   Rationale: improve balance and increase proprioception  to improve the patients ability to walk. With   [] TE   [] manual Patient Education: [x] Review HEP    [] Progressed/Changed HEP based on:   [] positioning   [] body mechanics   [] transfers   [] heat/ice application    [] other:     Other Objective/Functional Measures:         Pain Level (0-10 scale) post treatment: 3    ASSESSMENT/Changes in Function:     Patient will continue to benefit from skilled PT services to modify and progress therapeutic interventions, address functional mobility deficits, address strength deficits, analyze and address soft tissue restrictions, analyze and cue movement patterns, and analyze and modify body mechanics/ergonomics to attain remaining goals. Progress towards goals / Updated goals:  Held on some of ex's today due to increased low back pain.      PLAN  [] Upgrade activities as tolerated     []  Continue plan of care  []  Update interventions per flow sheet       []  Discharge due to:_  [x]  Other:_  re-assessment next session    David Koch.  Chance, PT 9/19/2022

## 2022-09-19 NOTE — TELEPHONE ENCOUNTER
Last Visit: 8/31/22 MD Christianna Osler, lipid 4/2022  Next Appointment: 10/4/22 MD Christianna Osler  Previous Refill Encounter(s): 4/11/22 90 + 1    Requested Prescriptions     Pending Prescriptions Disp Refills    rosuvastatin (CRESTOR) 10 mg tablet 90 Tablet 1     Sig: TAKE 1 TAB BY MOUTH NIGHTLY. FOR CHOLESTEROL     For Pharmacy Admin Tracking Only    CPA in place:   Recommendation Provided To:    Intervention Detail: New Rx: 1, reason: Patient Preference  Gap Closed?:   Intervention Accepted By:   Time Spent (min): 5

## 2022-09-21 ENCOUNTER — PATIENT MESSAGE (OUTPATIENT)
Dept: FAMILY MEDICINE CLINIC | Age: 62
End: 2022-09-21

## 2022-09-21 NOTE — TELEPHONE ENCOUNTER
From: Lynn Mon  To: Lena Duran MD  Sent: 9/21/2022 12:57 AM EDT  Subject: Fall - 9/20/22    This message is being sent by Mika Chapman on behalf of Lynn Mon. Good evening. I thought it might be important to share the following info. Geo Moralez had his first episode of falling completely to the floor this evening. He lost his balance in our family room. There was nothing nearby to grab onto, so he went all the way down - landing on his backside/tailbone and head hit the coffee table before he ended up flat on his back. No swelling on his head. Arms were sore possibly from protective reflex. Backside was sore as well, but we didn't think he needed any emergency treatment. He took a Meloxicam before bed. We'll keep you posted if there are any further developments. Geo Moralez will also tell his PT about this when he sees her on Monday. Geo Moralez had been just using his cane when we're away from home. He keeps it in his truck. We may need to consider getting him one to use in the house. These changes are hard to accept, but we'll do whatever needs to be done for safety. Thank you!     Misty Reaves

## 2022-09-26 ENCOUNTER — HOSPITAL ENCOUNTER (OUTPATIENT)
Dept: PHYSICAL THERAPY | Age: 62
Discharge: HOME | End: 2022-09-26
Payer: COMMERCIAL

## 2022-09-26 ENCOUNTER — TELEPHONE (OUTPATIENT)
Dept: FAMILY MEDICINE CLINIC | Age: 62
End: 2022-09-26

## 2022-09-26 PROCEDURE — 97112 NEUROMUSCULAR REEDUCATION: CPT | Performed by: PHYSICAL THERAPIST

## 2022-09-26 PROCEDURE — 97110 THERAPEUTIC EXERCISES: CPT | Performed by: PHYSICAL THERAPIST

## 2022-09-26 NOTE — TELEPHONE ENCOUNTER
Called stated that the MRI of the spine was denied give [employer] a call to do a peer to peer review  [phone]  Order ID [id number].     Requesting a call back    Best call back #[phone]

## 2022-09-26 NOTE — PROGRESS NOTES
PT DAILY TREATMENT NOTE [facility] NOTE    Patient Name: Nicole Gold  Date:2022  : 1960  [x]  Patient  Verified  Payor: Tejas Alonso / Plan: Tiana Wynn / Product Type: HMO /    In time:1030 A  Out time:1100 A  Total Treatment Time (min): 30  Total Timed Codes (min): 30  1:1 Treatment Time ([facility]/[place] only):30  Visit #:  7    Treatment Area: Neuropathy [G62.9]    SUBJECTIVE  Pain Level (0-10 scale): 5  Any medication changes, allergies to medications, adverse drug reactions, diagnosis change, or new procedure performed?: [x] No    [] Yes (see summary sheet for update)  Subjective functional status/changes:       Pt fell over the weekend. Landed on buttock and hit his head. Called his MD and was advised by his MD to go to ER. He did not do so. States his back pain felt better after the fall, just having some buttock pain where  he hit. Denies concussion symptoms. OBJECTIVE      [x] Skin assessment post-treatment:  [x]intact []redness- no adverse reaction    []redness - adverse reaction:     15 min Therapeutic Exercise:  [x] See flow sheet :re-assessment. Rationale: increase strength, improve coordination, improve balance, and increase proprioception to improve the patients ability to perform ADL's    15 min Neuromuscular Re-education:  -  See flow sheet :   Rationale: improve balance and increase proprioception  to improve the patients ability to walk. With   [] TE   [] manual Patient Education: [x] Review HEP    [] Progressed/Changed HEP based on:   [] positioning   [] body mechanics   [] transfers   [] heat/ice application    [] other:     Other Objective/Functional Measures:   Observation: forward trunk lean throughout     Ambulation: Pt ambulates using SBC. Excessive knee extension at initial contact jero right>left. Difficulty with turns, seems unsteady.      Modified CTSIB test for balance (all Rhomberg, arms crossed)  Condition 1 (ground, EO): 10 sec, sig LOB in posterior direction  Condition 2 (ground, EC): NT  Condition 3 (foam, EO):NT  Condition 4 (foam, EC): NT        Tandem balance:   (R): 5 sec  (L): 4 sec     30 sec sit to stands: 5 times from standard chair. Use of jero UE for ascent and decent. Stairs: reciprocal pattern to ascend, step to to lower      MMT:                                                   (R)      (L)  Knee Flexion 4 4     Extension 4 4   Ankle Plantarflexion 2 2     Dorsiflexion 3 3          Pain Level (0-10 scale) post treatment: 5    ASSESSMENT/Changes in Function:             Progress towards goals / Updated goals:  Short Term Goals: To be accomplished in 5 treatments:  -Normalized gait pattern with use of either cane or rollator do dec fall risk-MET, using cane in community consistently, instructed to use cane at home more consistently in order to dec fall risk.  -Independent in HEP as evidenced on ability to perform at least 5 exercises from HEP using proper form without verbal cuing. -MET  -Pt will report he is doing gentle cardio 2-3 days per week for overall mobility and strength. -NOT MET       Long Term Goals: To be accomplished in 3 months:  -TUG score <13 to dec fall risk in community-NOT MET, not progressing.  -Pt will improved 30 sec sit to stand score by 3 reps (=8) to improve ability to perform household chores--NOT MET, not progressing.  -Tandem balance =30 jero to dec fall risk-NOT MET, not progressing.  -Fall confidence increased by at least 50% to allow pt to flip houses with greater ease-NOT MET, not progressing. Despite compliance to initiation of cane use and HEP, pt has not progressed in his strength and balance since onset of PT. Pt with one more visit of PT, then pt returns to PCP for re-assessment of current symptom status.      PLAN  []  Upgrade activities as tolerated     []  Continue plan of care  []  Update interventions per flow sheet       []  Discharge due to:_  [x]  Other: pt sees Rhematology this afternoon, Lumbar MRI scheduled for Wed. Gary Guajardo.  Chance, PT 9/26/2022

## 2022-09-27 NOTE — TELEPHONE ENCOUNTER
[facility] from [facility] called checking the status of the peer to peer. Patient is coming in tomorrow.     Requesting a call back    Best call back #[phone]

## 2022-09-28 NOTE — TELEPHONE ENCOUNTER
Kingsley Lyons, patient name and  confirmed. And informed her that dr. Sergio Esteves told peer to peer takes time. Patient has to wait. She stated understanding. She stated that please call when you complete peer to peer.

## 2022-10-03 ENCOUNTER — HOSPITAL ENCOUNTER (OUTPATIENT)
Dept: PHYSICAL THERAPY | Age: 62
Discharge: HOME | End: 2022-10-03
Payer: COMMERCIAL

## 2022-10-03 PROCEDURE — 97110 THERAPEUTIC EXERCISES: CPT | Performed by: PHYSICAL THERAPIST

## 2022-10-03 PROCEDURE — 97112 NEUROMUSCULAR REEDUCATION: CPT | Performed by: PHYSICAL THERAPIST

## 2022-10-03 NOTE — PROGRESS NOTES
PT DAILY TREATMENT NOTE/PROGRESS NOTE    Patient Name: Tera Barrientos  Date:10/3/2022  : 1960  [x]  Patient  Verified  Payor: Tania Ayleen / Plan: Sisto Grooms / Product Type: HMO /    In time:1030 A  Out time:1100 A  Total Treatment Time (min):30  Total Timed Codes (min): 30  1:1 Treatment Time ([facility] only):30  Visit #:  8    Treatment Area: Neuropathy [G62.9]    SUBJECTIVE  Pain Level (0-10 scale): 6  Any medication changes, allergies to medications, adverse drug reactions, diagnosis change, or new procedure performed?: [x] No    [] Yes (see summary sheet for update)  Subjective functional status/changes:     Pt saw Rheumatologist.  Was put on Celebrex. Took bloodwork but has not gotten results back. OBJECTIVE      [x] Skin assessment post-treatment:  [x]intact []redness- no adverse reaction    []redness - adverse reaction:     15 min Therapeutic Exercise:  [x] See flow sheet :   Rationale: increase strength, improve coordination, improve balance, and increase proprioception to improve the patients ability to perform ADL's      15 min Neuromuscular Re-education:  -  See flow sheet :   Rationale: improve balance and increase proprioception  to improve the patients ability to walk. With   [] TE   [] manual Patient Education: [x] Review HEP    [] Progressed/Changed HEP based on:   [] positioning   [] body mechanics   [] transfers   [] heat/ice application    [] other:     Other Objective/Functional Measures:   Observation: forward trunk lean throughout     Ambulation: Pt ambulates, touching door frames, edge of bed, etc for additional support. Excessive knee extension at initial contact jero right>left. Difficulty with turns, seems unsteady.      Modified CTSIB test for balance (all Rhomberg, arms crossed)  Condition 1 (ground, EO): 30 sec, sig LOB in posterior direction and to the right  Condition 2 (ground, EC): NT  Condition 3 (foam, EO):NT  Condition 4 (foam, EC): NT        Tandem balance:   (R): 10 sec  (L): 4 sec     30 sec sit to stands: 5 times from standard chair. Use of jero UE for ascent and decent. Stairs: reciprocal pattern to ascend, step to to lower      MMT:                                                   (R)      (L)  Knee Flexion 4 4     Extension 4 4   Ankle Plantarflexion 2 2     Dorsiflexion 3 3          Pain Level (0-10 scale) post treatment: 6    ASSESSMENT/Changes in Function:            Progress towards goals / Updated goals:    Short Term Goals: To be accomplished in 5 treatments:  -Normalized gait pattern with use of either cane or rollator do dec fall risk-MET cane use  -Independent in HEP as evidenced on ability to perform at least 5 exercises from HEP using proper form without verbal cuing. -MET  -Pt will report he is doing gentle cardio 2-3 days per week for overall mobility and strength. -PARTIALLY MET-pt unable to do so due to hip pain     Long Term Goals: To be accomplished in 3 months:  -TUG score <13 to dec fall risk in community-NOT MET  -Pt will improved 30 sec sit to stand score by 3 reps (=8) to improve ability to perform household chores-NOT MET  -Tandem balance =30 jero to dec fall risk-NOT MET  -Fall confidence increased by at least 50% to allow pt to flip houses with greater ease-NOT MET    Although pt shows improved skills to prevent falls such as use of cane, use of grab bars. Compliant with HEP and attending sessions consistently. Despite this, pt still having many near falls and most recently had a fall last week. At this time, pt has not progressed toward LTG. Recommend follow up with PCP to determine next steps in his POC. PLAN  []  Upgrade activities as tolerated     []  Continue plan of care  []  Update interventions per flow sheet       []  Discharge due to:_  []  Other:_     Svetlana Ayala.  Chan[place], PT 10/3/2022

## 2022-10-04 ENCOUNTER — OFFICE VISIT (OUTPATIENT)
Dept: FAMILY MEDICINE CLINIC | Age: 62
End: 2022-10-04
Payer: COMMERCIAL

## 2022-10-04 VITALS
HEIGHT: 73 IN | BODY MASS INDEX: 35.94 KG/M2 | RESPIRATION RATE: 16 BRPM | HEART RATE: 82 BPM | SYSTOLIC BLOOD PRESSURE: 110 MMHG | OXYGEN SATURATION: 96 % | WEIGHT: 271.2 LBS | DIASTOLIC BLOOD PRESSURE: 62 MMHG | TEMPERATURE: 97.8 F

## 2022-10-04 DIAGNOSIS — E66.9 OBESITY (BMI 30-39.9): ICD-10-CM

## 2022-10-04 DIAGNOSIS — Z00.00 ANNUAL PHYSICAL EXAM: Primary | ICD-10-CM

## 2022-10-04 DIAGNOSIS — Z23 NEED FOR SHINGLES VACCINE: ICD-10-CM

## 2022-10-04 DIAGNOSIS — Z57.9 MILD INTERMITTENT OCCUPATIONAL ASTHMA WITHOUT COMPLICATION: ICD-10-CM

## 2022-10-04 DIAGNOSIS — R53.82 CHRONIC FATIGUE: ICD-10-CM

## 2022-10-04 DIAGNOSIS — R26.89 IMBALANCE: ICD-10-CM

## 2022-10-04 DIAGNOSIS — R25.2 LEG CRAMPS: ICD-10-CM

## 2022-10-04 DIAGNOSIS — E78.00 HYPERCHOLESTEROLEMIA: ICD-10-CM

## 2022-10-04 DIAGNOSIS — R29.898 WEAKNESS OF BOTH LOWER EXTREMITIES: ICD-10-CM

## 2022-10-04 DIAGNOSIS — E55.9 HYPOVITAMINOSIS D: ICD-10-CM

## 2022-10-04 DIAGNOSIS — J45.20 MILD INTERMITTENT OCCUPATIONAL ASTHMA WITHOUT COMPLICATION: ICD-10-CM

## 2022-10-04 DIAGNOSIS — R73.03 PREDIABETES: ICD-10-CM

## 2022-10-04 DIAGNOSIS — Z99.89 OSA ON CPAP: ICD-10-CM

## 2022-10-04 DIAGNOSIS — Z12.5 SCREENING FOR MALIGNANT NEOPLASM OF PROSTATE: ICD-10-CM

## 2022-10-04 DIAGNOSIS — R53.83 FATIGUE, UNSPECIFIED TYPE: ICD-10-CM

## 2022-10-04 DIAGNOSIS — G47.33 OSA ON CPAP: ICD-10-CM

## 2022-10-04 DIAGNOSIS — R06.09 DOE (DYSPNEA ON EXERTION): ICD-10-CM

## 2022-10-04 DIAGNOSIS — G62.9 NEUROPATHY: ICD-10-CM

## 2022-10-04 DIAGNOSIS — D50.9 IRON DEFICIENCY ANEMIA, UNSPECIFIED IRON DEFICIENCY ANEMIA TYPE: ICD-10-CM

## 2022-10-04 PROCEDURE — 99396 PREV VISIT EST AGE 40-64: CPT | Performed by: FAMILY MEDICINE

## 2022-10-04 RX ORDER — DULOXETIN HYDROCHLORIDE 30 MG/1
CAPSULE, DELAYED RELEASE ORAL
COMMUNITY
Start: 2022-09-26

## 2022-10-04 SDOH — HEALTH STABILITY - PHYSICAL HEALTH: OCCUPATIONAL EXPOSURE TO UNSPECIFIED RISK FACTOR: Z57.9

## 2022-10-04 NOTE — PROGRESS NOTES
Chief Complaint   Patient presents with    Complete Physical     1. Have you been to the ER, urgent care clinic since your last visit? Hospitalized since your last visit? No    2. Have you seen or consulted any other health care providers outside of the [address] [place] [address] since your last visit? Include any pap smears or colon screening.  No

## 2022-10-04 NOTE — PROGRESS NOTES
Ria Hancock  58 y.o. male  1960  [facility] [id number]  [id number]     [address]       Encounter Date: 10/4/2022           Established Patient Visit Note: Juana Mario MD    Reason for Appointment:  Chief Complaint   Patient presents with    Complete Physical         History of Present Illness:  History provided by patient    Rai Hancock is a 58 y.o. male who presents to clinic today for:     Annual Physical    Neuropathy, Lower Extremity Weakness, Imbalance: He had PT yesterday, and he reports that he tends to lean backwards and to the right when he is trying to balance. MRI ordered on 9/12/22, but this was declined by his insurance. He saw rheumatology (Dr. Gracie Munoz) on 9/26/22 who ordered labs and prescribed cymbalta. He has not yet started cymbalta. He has follow up visit on 10/28/22. He has apt with neurology (Dr. Kateryna Howell) on 11/7/22. He has follow up visit with his podiatrist tomorrow. Today he reports that he is not capable of going back to work. He reports that his had FMLA extension through 10/12/22. He reports that his PTO runs out the week of 10/21/22. Today he also reports having anal leakage and occasional fecal incontinence. Claudication: ABIs ordered by cardiology (Dr. Ruben Casanova) that were normal.   Prediabetes: last A1c was 5.9 on 7/20/22  Vitamin D Deficiency: he is taking Vit D 7000 daily  He reports that he has been taking iron supplementation (325 daily)  Hypothalamic Dysfunction: he reports that he followed up with endocrinology, and he reports that endocrinology (Dr. Fatemeh Hernández) told him that it  had healed itself and it has resolved. Fatigue: he continues to have fatigue.  He reports needing to sleep between 10-12 hours per day  FINESSE on CPAP: managed by PAR; he reports good compliance with CPAP  HLD: tolerating Crestor with CoQ10 and magnesium  Occupational Asthma: his wife reports as related to history of freon exposure; followed by pulmonology. Obesity: He has last 6 lbs from last visit. He continues to work on diet and exercise. Diet: he reports that he likes to eat turkey and cheese sandwiches, for breakfast he will have cup of coffee and muffin, he eats a variety of foods. He does endorse enjoying cookies. He does not eat very many vegetables. Exercise: he reports that he has been trying to stay active, but it has been hard with the neuropathy    Immunizations: he declines Flu and Covid vaccines. Screenings:  He last had colonoscopy performed in 2018  PSA: last PSA was 0.7 on 01/12/22      Review of Systems  All other ROS were reviewed and are negative except as discussed in HPI        Allergies: Cortisone (hydrocortisone) [hydrocortisone] and Zocor [simvastatin]    Medications:     Current Outpatient Medications:     DULoxetine (CYMBALTA) 30 mg capsule, , Disp: , Rfl:     rosuvastatin (CRESTOR) 10 mg tablet, TAKE 1 TAB BY MOUTH NIGHTLY. FOR CHOLESTEROL, Disp: 90 Tablet, Rfl: 1    cholecalciferol (VITAMIN D3) (5000 Units/125 mcg) tab tablet, Take 5,000 Units by mouth daily. 7000 units daily, Disp: , Rfl:     B6/mfolate/mcobal/ALA/benfotia (EB-N6 DR PO), , Disp: , Rfl:     loratadine (CLARITIN) 10 mg tablet, 1 tab(s), Disp: , Rfl:     ferrous sulfate (IRON) 325 mg (65 mg iron) EC tablet, Take 1 Tablet by mouth Daily (before breakfast). , Disp: 90 Tablet, Rfl: 1    ascorbate calcium (BUFFERED VITAMIN C PO), , Disp: , Rfl:     cholecalciferol (VITAMIN D3) (2,000 UNITS /50 MCG) cap capsule, , Disp: , Rfl:     GLUTATHIONE, , Disp: , Rfl:     meloxicam (MOBIC) 15 mg tablet, 1 tab(s), Disp: , Rfl:     Zinc Gluconate-Zinc Picolinate 30 mg cap, , Disp: , Rfl:     multivitamin pwpk, , Disp: , Rfl:     magnesium glycinate-mag oxide 120 mg magnesium cap, , Disp: , Rfl:     QUERCETIN DIHYDRATE, BULK,, , Disp: , Rfl:     ubidecarenone/vitamin E mixed (COQ10  PO), , Disp: , Rfl:     calcium carb-vitamin D3-vit K2 (Calcium Plus MenaQ7 Adult) 500 mg calcium- 200 unit-90 mcg tab, , Disp: , Rfl:     BORON, BULK,, , Disp: , Rfl:     OTHER, Take  by mouth six (6) days a week. prime secretagogue supplement, Disp: , Rfl:     albuterol (PROAIR HFA) 90 mcg/actuation inhaler, INHALE 2 INHALATIONS BY MOUTH EVERY 6 HOURS AS NEEDED FOR WHEEZING OR SHORTNESS OF BREATH, Disp: 1 Inhaler, Rfl: 1    potassium gluconate 550 mg (90 mg) tab, 1 tablet (Patient not taking: No sig reported), Disp: , Rfl:     vitamin B complex (B COMPLEX 1 PO), , Disp: , Rfl:     acetylcysteine (N-ACETYL-L-CYSTEINE), , Disp: , Rfl:     bismuth subsalicylate (PEPTO-BISMOL PO), Take  by mouth as needed. (Patient not taking: No sig reported), Disp: , Rfl:     History  Patient Care Team:  Juan Antonio Wilder MD as PCP - General (Family Medicine)  Juan Antonio Wilder MD as PCP - [facility]  Ashwini Garcia MD (Pulmonary Disease)  Alicia Bush MD (Cardiovascular Disease Physician)    Past Medical History: he has a past medical history of Anal fissure, Arthritis, Asthma, Benign positional vertigo, Hematuria of undiagnosed cause (01/2018), Hypercholesterolemia, Hypothalamic syndrome ([facility] [address] (1969), Muscle weakness, Neuropathy, Pulmonary nodule seen on imaging study (07/12/2016), and Vitamin D deficiency. Past Surgical History: he has a past surgical history that includes hx colonoscopy (01/2018); hx orthopaedic; and hx urological (2018). Family Medical History: family history is not on file. Social History: he reports that he has never smoked. He has never used smokeless tobacco. He reports current alcohol use of about 7.0 standard drinks per week. He reports that he does not use drugs.         Objective:   Visit Vitals  /62 (BP 1 Location: Right arm, BP Patient Position: Sitting, BP Cuff Size: Adult long)   Pulse 82   Temp 97.8 °F (36.6 °C) (Temporal)   Resp 16   Ht 6' 1\" (1.854 m)   Wt 271 lb 3.2 oz (123 kg)   SpO2 96%   BMI 35.78 kg/m²     Wt Readings from Last 3 Encounters:   10/04/22 271 lb 3.2 oz (123 kg)   08/31/22 271 lb (122.9 kg)   08/22/22 268 lb (121.6 kg)       Physical Exam  HENT:      Head: Normocephalic and atraumatic. Right Ear: External ear normal.      Left Ear: External ear normal.      Nose: Nose normal.      Mouth/Throat:      Mouth: Mucous membranes are moist.      Pharynx: No oropharyngeal exudate. Eyes:      General: Lids are normal. No scleral icterus. Right eye: No discharge. Left eye: No discharge. Extraocular Movements: Extraocular movements intact. Conjunctiva/sclera: Conjunctivae normal.      Pupils: Pupils are equal, round, and reactive to light. Neck:      Thyroid: No thyromegaly. Vascular: No JVD. Trachea: No tracheal deviation. Cardiovascular:      Rate and Rhythm: Normal rate and regular rhythm. Pulses:           Radial pulses are 2+ on the right side and 2+ on the left side. Heart sounds: Normal heart sounds. No murmur heard. No friction rub. No gallop. Pulmonary:      Effort: Pulmonary effort is normal. No respiratory distress. Breath sounds: Normal breath sounds. No stridor. No wheezing. Abdominal:      General: Bowel sounds are normal. There is no distension. Palpations: Abdomen is soft. There is no mass. Tenderness: There is no abdominal tenderness. There is no guarding or rebound. Musculoskeletal:         General: No tenderness or deformity. Normal range of motion. Cervical back: Normal range of motion and neck supple. Right lower leg: No edema. Left lower leg: No edema. Lymphadenopathy:      Cervical: No cervical adenopathy. Skin:     General: Skin is warm and dry. Neurological:      Mental Status: He is alert. Cranial Nerves: No cranial nerve deficit. Coordination: Coordination normal.      Gait: Gait is intact.       Deep Tendon Reflexes: Reflexes normal.   Psychiatric:         Mood and Affect: Mood normal.         Behavior: Behavior normal.         Thought Content: Thought content normal.       Assessment & Plan:      ICD-10-CM ICD-9-CM    1. Fatigue, unspecified type  R53.83 780.79 IRON PROFILE      FERRITIN      IRON PROFILE      FERRITIN      2. Hypercholesterolemia  J24.04 466.5 METABOLIC PANEL, COMPREHENSIVE      METABOLIC PANEL, COMPREHENSIVE      3. Prediabetes  U07.29 735.82 METABOLIC PANEL, COMPREHENSIVE      HEMOGLOBIN A1C WITH EAG      URINALYSIS W/ RFLX MICROSCOPIC      TSH 3RD GENERATION      METABOLIC PANEL, COMPREHENSIVE      HEMOGLOBIN A1C WITH EAG      URINALYSIS W/ RFLX MICROSCOPIC      TSH 3RD GENERATION      4. Iron deficiency anemia, unspecified iron deficiency anemia type  D50.9 280.9 CBC W/O DIFF      CBC W/O DIFF      5. Hypovitaminosis D  E55.9 268.9 VITAMIN D, 25 HYDROXY      VITAMIN D, 25 HYDROXY      6. Chronic fatigue  R53.82 780.79 TSH 3RD GENERATION      TSH 3RD GENERATION      7. Need for shingles vaccine  Z23 V04.89 varicella-zoster recombinant, PF, (SHINGRIX) 50 mcg/0.5 mL susr injection      8. Screening for malignant neoplasm of prostate  Z12.5 V76.44 PSA SCREENING (SCREENING)      PSA SCREENING (SCREENING)        Annual Physical Exam: Reviewed and addressed patient's medical history and concerns as discussed in note. Reviewed recommended screenings and immunizations. Discussed recommendations for diet, exercise, and lifestyle. Neuropathy, Lower Extremity Weakness, Imbalance: Chronic, uncontrolled. Will attempt Peer to Peer. Advised patient to ensure that he attends visit with specialist as scheduled. Obesity: I have reviewed/discussed the above normal BMI with the patient. I have recommended the following interventions: dietary management education, guidance, and counseling, encourage exercise, monitor weight and prescribed dietary intake. All other conditions listed above: Chronic, stable and/or managed by specialist. Will continue current treatment regimen.  Will check labs as reflected above. Discussed following up with specialist as scheduled. Discussed recommendations for diet and exercise. I have discussed the diagnosis with the patient and the intended plan as seen in the above orders. The patient has received an after-visit summary along with patient information handout. I have discussed medication side effects and warnings with the patient as well. Disposition  Follow-up and Dispositions    Return in about 2 months (around 12/4/2022).            Laci Catalan MD

## 2022-10-05 LAB
25(OH)D3+25(OH)D2 SERPL-MCNC: 52 NG/ML (ref 30–100)
ALBUMIN SERPL-MCNC: 4.1 G/DL (ref 3.8–4.8)
ALBUMIN/GLOB SERPL: 1.5 {RATIO} (ref 1.2–2.2)
ALP SERPL-CCNC: 111 IU/L (ref 44–121)
ALT SERPL-CCNC: 22 IU/L (ref 0–44)
APPEARANCE UR: CLEAR
AST SERPL-CCNC: 17 IU/L (ref 0–40)
BACTERIA #/AREA URNS HPF: NORMAL /[HPF]
BILIRUB SERPL-MCNC: 0.2 MG/DL (ref 0–1.2)
BILIRUB UR QL STRIP: NEGATIVE
BUN SERPL-MCNC: 13 MG/DL (ref 8–27)
BUN/CREAT SERPL: 14 (ref 10–24)
CALCIUM SERPL-MCNC: 9.2 MG/DL (ref 8.6–10.2)
CASTS URNS QL MICRO: NORMAL /LPF
CHLORIDE SERPL-SCNC: 101 MMOL/L (ref 96–106)
CO2 SERPL-SCNC: 23 MMOL/L (ref 20–29)
COLOR UR: YELLOW
CREAT SERPL-MCNC: 0.96 MG/DL (ref 0.76–1.27)
EGFR: 89 ML/MIN/1.73
EPI CELLS #/AREA URNS HPF: NORMAL /HPF (ref 0–10)
ERYTHROCYTE [DISTWIDTH] IN BLOOD BY AUTOMATED COUNT: 13.1 % (ref 11.6–15.4)
EST. AVERAGE GLUCOSE BLD GHB EST-MCNC: 117 MG/DL
FERRITIN SERPL-MCNC: 250 NG/ML (ref 30–400)
GLOBULIN SER CALC-MCNC: 2.7 G/DL (ref 1.5–4.5)
GLUCOSE SERPL-MCNC: 146 MG/DL (ref 70–99)
GLUCOSE UR QL STRIP: NEGATIVE
HBA1C MFR BLD: 5.7 % (ref 4.8–5.6)
HCT VFR BLD AUTO: 38.8 % (ref 37.5–51)
HGB BLD-MCNC: 13.3 G/DL (ref 13–17.7)
HGB UR QL STRIP: NEGATIVE
IRON SATN MFR SERPL: 47 % (ref 15–55)
IRON SERPL-MCNC: 106 UG/DL (ref 38–169)
KETONES UR QL STRIP: NEGATIVE
LEUKOCYTE ESTERASE UR QL STRIP: ABNORMAL
MCH RBC QN AUTO: 33.9 PG (ref 26.6–33)
MCHC RBC AUTO-ENTMCNC: 34.3 G/DL (ref 31.5–35.7)
MCV RBC AUTO: 99 FL (ref 79–97)
MICRO URNS: ABNORMAL
NITRITE UR QL STRIP: NEGATIVE
PH UR STRIP: 5.5 [PH] (ref 5–7.5)
PLATELET # BLD AUTO: 225 X10E3/UL (ref 150–450)
POTASSIUM SERPL-SCNC: 4.5 MMOL/L (ref 3.5–5.2)
PROT SERPL-MCNC: 6.8 G/DL (ref 6–8.5)
PROT UR QL STRIP: NEGATIVE
PSA SERPL-MCNC: 0.7 NG/ML (ref 0–4)
RBC # BLD AUTO: 3.92 X10E6/UL (ref 4.14–5.8)
RBC #/AREA URNS HPF: NORMAL /HPF (ref 0–2)
SODIUM SERPL-SCNC: 140 MMOL/L (ref 134–144)
SP GR UR STRIP: 1.01 (ref 1–1.03)
TIBC SERPL-MCNC: 225 UG/DL (ref 250–450)
TSH SERPL DL<=0.005 MIU/L-ACNC: 1.44 UIU/ML (ref 0.45–4.5)
UIBC SERPL-MCNC: 119 UG/DL (ref 111–343)
UROBILINOGEN UR STRIP-MCNC: 0.2 MG/DL (ref 0.2–1)
WBC # BLD AUTO: 7.1 X10E3/UL (ref 3.4–10.8)
WBC #/AREA URNS HPF: NORMAL /HPF (ref 0–5)

## 2022-10-05 NOTE — PROGRESS NOTES
Dear Abraham Steel,    All of your labs are stable. If you have any questions, please feel free to call our office at [phone].      Cathy Foreman MD

## 2022-10-06 NOTE — TELEPHONE ENCOUNTER
Please inform patient that I called and completed Peer to Peer. He should be receiving authorization in the next few days.      Maria Fernanda Piper MD

## 2022-10-07 NOTE — TELEPHONE ENCOUNTER
Called Patient. Name and  confirmed. Informed patient that dr. Gurinder Fontaine called and completed peer to peer. So you should be receiving authorization in the next few days. Patient stated understanding.

## 2022-10-20 ENCOUNTER — HOSPITAL ENCOUNTER (OUTPATIENT)
Dept: MRI IMAGING | Age: 62
Discharge: HOME | End: 2022-10-20
Attending: FAMILY MEDICINE
Payer: COMMERCIAL

## 2022-10-20 DIAGNOSIS — R29.898 WEAKNESS OF BOTH LOWER EXTREMITIES: ICD-10-CM

## 2022-10-20 DIAGNOSIS — R26.89 IMBALANCE: ICD-10-CM

## 2022-10-20 DIAGNOSIS — G62.9 NEUROPATHY: ICD-10-CM

## 2022-10-20 PROCEDURE — 72148 MRI LUMBAR SPINE W/O DYE: CPT

## 2022-10-27 RX ORDER — ALBUTEROL SULFATE 90 UG/1
AEROSOL, METERED RESPIRATORY (INHALATION)
Qty: 1 EACH | Refills: 1 | Status: SHIPPED | OUTPATIENT
Start: 2022-10-27

## 2022-11-07 ENCOUNTER — DOCUMENTATION ONLY (OUTPATIENT)
Dept: NEUROLOGY | Age: 62
End: 2022-11-07

## 2022-11-07 ENCOUNTER — OFFICE VISIT (OUTPATIENT)
Dept: NEUROLOGY | Age: 62
End: 2022-11-07
Payer: COMMERCIAL

## 2022-11-07 VITALS
SYSTOLIC BLOOD PRESSURE: 118 MMHG | HEART RATE: 84 BPM | BODY MASS INDEX: 35.09 KG/M2 | DIASTOLIC BLOOD PRESSURE: 74 MMHG | RESPIRATION RATE: 18 BRPM | OXYGEN SATURATION: 99 % | WEIGHT: 266 LBS

## 2022-11-07 DIAGNOSIS — G25.0 BENIGN ESSENTIAL TREMOR: ICD-10-CM

## 2022-11-07 DIAGNOSIS — R27.8 SENSORY ATAXIA: ICD-10-CM

## 2022-11-07 DIAGNOSIS — G60.3 POLYNEUROPATHY, IDIOPATHIC, PROGRESSIVE: Primary | ICD-10-CM

## 2022-11-07 PROCEDURE — 99204 OFFICE O/P NEW MOD 45 MIN: CPT | Performed by: PSYCHIATRY & NEUROLOGY

## 2022-11-07 NOTE — PROGRESS NOTES
PT referral faxed to [facility] at [phone].  (No insurance is on the paper as the wife has stated that they are still waiting for the insurance cards, but states that she will notify [facility] when they have it.)

## 2022-11-07 NOTE — PROGRESS NOTES
Chief Complaint   Patient presents with    New Patient     Complain of neuropathy in bilateral feet and lower extremities. HISTORY OF PRESENT ILLNESS  Gretchen James is a 58 y.o. male who came in for evaluation regarding polyneuropathy that was diagnosed about a year and a half ago. He states that he has had pain and numbness in his legs for many years and it has been progressive. He has seen podiatry and neurology Dr. Scotty Garcia last year. They did an EMG which was consistent with a mixed motor sensory polyneuropathy. He has had an extensive work-up in this regard and it was all unremarkable except that he may be a prediabetic, which is new. Currently takes Cymbalta which has helped with some range of motion at the ankle. His balance has been deteriorating and could not continue working because of it. He used to work at [employer]. Uses a cane to stabilize himself. He has been in physical therapy in the past but then he quit because he was not getting any benefit. Recently he has been noticing some numbness and tingling in his hands as well. Denies any family history of progressive polyneuropathy. He has also seen endocrinology and rheumatology. Past Medical History:   Diagnosis Date    Anal fissure     Arthritis     Asthma     Benign positional vertigo     Hematuria of undiagnosed cause 01/2018    Hypercholesterolemia     Hypothalamic syndrome ([facility] [address] 1969    s/t TBI, treated with growth hormone    Muscle weakness     Neuropathy     Pulmonary nodule seen on imaging study 07/12/2016    4 mm bilaterally, repeat CT in 1 year    Vitamin D deficiency      Current Outpatient Medications   Medication Sig    albuterol (ProAir HFA) 90 mcg/actuation inhaler INHALE 2 INHALATIONS BY MOUTH EVERY 6 HOURS AS NEEDED FOR WHEEZING OR SHORTNESS OF BREATH    DULoxetine (CYMBALTA) 30 mg capsule     rosuvastatin (CRESTOR) 10 mg tablet TAKE 1 TAB BY MOUTH NIGHTLY.  FOR CHOLESTEROL    cholecalciferol (VITAMIN D3) (5000 Units/125 mcg) tab tablet Take 5,000 Units by mouth daily. 7000 units daily    B6/mfolate/mcobal/ALA/benfotia (EB-N6 DR PO)     loratadine (CLARITIN) 10 mg tablet 1 tab(s)    ferrous sulfate (IRON) 325 mg (65 mg iron) EC tablet Take 1 Tablet by mouth Daily (before breakfast). ascorbate calcium (BUFFERED VITAMIN C PO)     cholecalciferol (VITAMIN D3) (2,000 UNITS /50 MCG) cap capsule     GLUTATHIONE     meloxicam (MOBIC) 15 mg tablet 1 tab(s)    Zinc Gluconate-Zinc Picolinate 30 mg cap     multivitamin pwpk     magnesium glycinate-mag oxide 120 mg magnesium cap     QUERCETIN DIHYDRATE, BULK,     ubidecarenone/vitamin E mixed (COQ10  PO)     calcium carb-vitamin D3-vit K2 (Calcium Plus MenaQ7 Adult) 500 mg calcium- 200 unit-90 mcg tab     BORON, BULK,     OTHER Take  by mouth six (6) days a week. prime secretagogue supplement    potassium gluconate 550 mg (90 mg) tab 1 tablet (Patient not taking: No sig reported)    vitamin B complex (B COMPLEX 1 PO)  (Patient not taking: Reported on 11/7/2022)    bismuth subsalicylate (PEPTO-BISMOL PO) Take  by mouth as needed. (Patient not taking: No sig reported)     No current facility-administered medications for this visit. Allergies   Allergen Reactions    Cortisone (Hydrocortisone) [Hydrocortisone] Herpetiformis Dermatitis     Reaction with eyes    Zocor [Simvastatin] Other (comments)     Bilateral leg cramps     No family history on file. Social History     Tobacco Use    Smoking status: Never    Smokeless tobacco: Never   Vaping Use    Vaping Use: Never used   Substance Use Topics    Alcohol use:  Yes     Alcohol/week: 7.0 standard drinks     Types: 7 Cans of beer per week     Comment: 1 every night    Drug use: No     Past Surgical History:   Procedure Laterality Date    HX COLONOSCOPY  01/2018    HX ORTHOPAEDIC      bilateral hips    HX UROLOGICAL  2018    cystoscopy with benign findings         REVIEW OF SYSTEMS  Review of Systems - History obtained from the patient  Psychological ROS: negative  ENT ROS: negative  Hematological and Lymphatic ROS: negative  Endocrine ROS: negative  Respiratory ROS: no cough, shortness of breath, or wheezing  Cardiovascular ROS: no chest pain or dyspnea on exertion  Gastrointestinal ROS: no abdominal pain, change in bowel habits, or black or bloody stools  Genito-Urinary ROS: no dysuria, trouble voiding, or hematuria  Musculoskeletal ROS: negative  Dermatological ROS: negative      PHYSICAL EXAMINATION:    Visit Vitals  /74   Pulse 84   Resp 18   Wt 266 lb (120.7 kg)   SpO2 99%   BMI 35.09 kg/m²     General:  Well groomed individual in no acute distress. Neck: Supple, nontender, no bruits, no pain with resistance to active range of motion. Heart: Regular rate and rhythm. Normal S1S2. Lungs:  Equal chest expansion, no cough, no wheeze  Musculoskeletal:  Extremities revealed no edema and had full range of motion of joints. Psych:  Good mood and bright affect    NEUROLOGICAL EXAMINATION:     Mental Status:   Alert and oriented to person, place, and time with recent and remote memory intact. Attention span and concentration are normal. Speech is fluent. Cranial Nerves:    II, III, IV, VI:  Visual acuity grossly intact. Visual fields are normal.    Pupils are equal, round, and reactive to light. Extra-ocular movements are full and fluid. Fundoscopic exam was benign, no ptosis or nystagmus. V-XII: Hearing is grossly intact. Facial features are symmetric, with normal sensation and strength. The palate rises symmetrically and the tongue protrudes midline. Sternocleidomastoids 5/5. Motor Examination: Normal tone, bulk, and strength. 5/5 muscle strength throughout. No cogwheel rigidity or clonus present. Sensory exam: Impaired pinprick and vibration sense in both legs below knees with distal to proximal gradient.   Impaired pinprick and vibration sense in his fingers as well compared to proximal arms.  Impaired proprioception. Coordination:  Finger to nose and rapid arm movement testing was normal.  Fine 8 to 9 Hz tremor was noted in the fingers of right hand when they were outstretched. Gait and Station: Unsteady. Uses a cane to stabilize. Negative Romberg. Cannot do tandem walking. No muscle wasting or fasiculations noted. Reflexes:  DTRs 1 + at biceps, brachioradialis. Knee jerks and ankle jerks absent. Toes downgoing. LABS / IMAGING  Lab Results   Component Value Date/Time    WBC 7.1 10/04/2022 12:00 AM    HGB 13.3 10/04/2022 12:00 AM    HCT 38.8 10/04/2022 12:00 AM    PLATELET 231 68/21/5503 12:00 AM    MCV 99 (H) 10/04/2022 12:00 AM     Lab Results   Component Value Date/Time    Sodium 140 10/04/2022 12:00 AM    Potassium 4.5 10/04/2022 12:00 AM    Chloride 101 10/04/2022 12:00 AM    CO2 23 10/04/2022 12:00 AM    Anion gap 5 11/26/2021 12:28 PM    Glucose 146 (H) 10/04/2022 12:00 AM    BUN 13 10/04/2022 12:00 AM    Creatinine 0.96 10/04/2022 12:00 AM    BUN/Creatinine ratio 14 10/04/2022 12:00 AM    GFR est AA >60 11/26/2021 12:28 PM    GFR est non-AA >60 11/26/2021 12:28 PM    Calcium 9.2 10/04/2022 12:00 AM    Bilirubin, total 0.2 10/04/2022 12:00 AM    Alk.  phosphatase 111 10/04/2022 12:00 AM    Protein, total 6.8 10/04/2022 12:00 AM    Albumin 4.1 10/04/2022 12:00 AM    Globulin 3.7 11/26/2021 12:28 PM    A-G Ratio 1.5 10/04/2022 12:00 AM    ALT (SGPT) 22 10/04/2022 12:00 AM    AST (SGOT) 17 10/04/2022 12:00 AM     Lab Results   Component Value Date/Time    TSH 1.440 10/04/2022 12:00 AM     Lab Results   Component Value Date/Time    Vitamin B12 558 07/20/2022 12:00 AM    Folate >20.0 07/20/2022 12:00 AM     Lab Results   Component Value Date/Time    Hemoglobin A1c 5.7 (H) 10/04/2022 12:00 AM     Protein electrophoresis normal    Lab Results   Component Value Date/Time    Cholesterol, total 121 04/11/2022 12:00 AM    HDL Cholesterol 43 04/11/2022 12:00 AM    LDL, calculated 57 04/11/2022 12:00 AM    LDL, calculated 60 11/26/2021 12:28 PM    VLDL, calculated 21 04/11/2022 12:00 AM    VLDL, calculated 23 11/26/2021 12:28 PM    Triglyceride 116 04/11/2022 12:00 AM    CHOL/HDL Ratio 2.8 11/26/2021 12:28 PM       ASSESSMENT    ICD-10-CM ICD-9-CM    1. Polyneuropathy, idiopathic, progressive  G60.3 356.4 EMG NCV MOTOR WO F/WAVE PER NERVE      REFERRAL TO PHYSICAL THERAPY      2. Benign essential tremor  G25.0 333.1       3. Sensory ataxia  R27.8 781.3 REFERRAL TO PHYSICAL THERAPY          DISCUSSION  Mr. Jailyn Gore has idiopathic progressive polyneuropathy which has been progressive over the years. He has impaired sensation in his legs below the knees and recently he has been experiencing some numbness/tingling in his hands as well. His balance is poor and uses a cane for stability. Last EMG in July 2021 showed moderate to severe polyneuropathy affecting sensory greater than motor fibers  He is a borderline diabetic but this has been a recent development  Fortunately he does not get much pain and he has been noticing some benefit with range of motion around his ankles with Cymbalta  I have recommended repeat EMG/NCV for comparison  Referral was provided to go for a course of PT to strengthen his gait and balance  We discussed that treatment for polyneuropathy is essentially supportive and to treat the cause. So far no cause has been found in his case. He should try to stay physically active as possible and continue to use assistive device as needed. Thank you for allowing me to participate in the care of Mr. Sonu Dempsey. Please feel free to contact me if you have any questions. I will be happy to follow to follow him along with you.       Maki Balderas MD  Diplomate, American Board of Psychiatry & Neurology (Neurology)  [place] Board of Psychiatry & Neurology (Clinical Neurophysiology)  Diplomate, American Board of Electrodiagnostic Medicine

## 2022-11-09 ENCOUNTER — VIRTUAL VISIT (OUTPATIENT)
Dept: FAMILY MEDICINE CLINIC | Age: 62
End: 2022-11-09
Payer: COMMERCIAL

## 2022-11-09 DIAGNOSIS — G47.33 OSA ON CPAP: ICD-10-CM

## 2022-11-09 DIAGNOSIS — G62.9 NEUROPATHY: ICD-10-CM

## 2022-11-09 DIAGNOSIS — E78.00 HYPERCHOLESTEROLEMIA: ICD-10-CM

## 2022-11-09 DIAGNOSIS — R29.898 WEAKNESS OF BOTH LOWER EXTREMITIES: ICD-10-CM

## 2022-11-09 DIAGNOSIS — Z99.89 OSA ON CPAP: ICD-10-CM

## 2022-11-09 DIAGNOSIS — R53.83 FATIGUE, UNSPECIFIED TYPE: Primary | ICD-10-CM

## 2022-11-09 DIAGNOSIS — D50.9 IRON DEFICIENCY ANEMIA, UNSPECIFIED IRON DEFICIENCY ANEMIA TYPE: ICD-10-CM

## 2022-11-09 DIAGNOSIS — R73.03 PREDIABETES: ICD-10-CM

## 2022-11-09 DIAGNOSIS — E55.9 HYPOVITAMINOSIS D: ICD-10-CM

## 2022-11-09 DIAGNOSIS — R26.89 IMBALANCE: ICD-10-CM

## 2022-11-09 PROCEDURE — 99213 OFFICE O/P EST LOW 20 MIN: CPT | Performed by: FAMILY MEDICINE

## 2022-11-09 NOTE — PROGRESS NOTES
Tera Barrientos  58 y.o. male  1960  [facility] [address] [id number]  [id number]     [address]       Encounter Date: 11/9/2022           Established Patient Visit Note: Sofie Evans MD    Reason for Appointment:  Chief Complaint   Patient presents with    Follow-up         History of Present Illness:  History provided by patient    Tera Barrientos is a 58 y.o. male who presents today for:     Neuropathy, Lower Extremity Weakness, Imbalance. He reports that he had another fall. He reports that he was outside and his foot slipped. He reports that he only injured his pride. He reports that he has been terminated from work as of 10/14/22. He saw neurology who is planning EMG testing and he was referred for gait training. He reports that he has recently noticed increased numbness in the hands and arms. He has not yet reached out to unemployment office to identify employment that would not require walking. Claudication: ABIs ordered by cardiology (Dr. Jeannette Lazaro) that were normal.   Prediabetes: last A1c was 5.7 on 10/4/22. He is working to minimize excess surger. Vitamin D Deficiency: he is taking Vit D 7000 daily; his last vit d was 46 on 10/4/22  He reports that he has been taking iron supplementation (325 daily)  Fatigue: He reports as somewhat improved. FINESSE on CPAP: managed by [facility]; he reports good compliance with CPAP  HLD: tolerating Crestor with CoQ10 and magnesium  Occupational Asthma: his wife reports as related to history of freon exposure; followed by pulmonology.    Obesity: He continues to work on diet and exercise      Review of Systems  All other ROS were reviewed and are negative except as discussed in HPI        Allergies: Cortisone (hydrocortisone) [hydrocortisone] and Zocor [simvastatin]    Medications:     Current Outpatient Medications:     albuterol (ProAir HFA) 90 mcg/actuation inhaler, INHALE 2 INHALATIONS BY MOUTH EVERY 6 HOURS AS NEEDED FOR WHEEZING OR SHORTNESS OF BREATH, Disp: 1 Each, Rfl: 1    DULoxetine (CYMBALTA) 30 mg capsule, , Disp: , Rfl:     rosuvastatin (CRESTOR) 10 mg tablet, TAKE 1 TAB BY MOUTH NIGHTLY. FOR CHOLESTEROL, Disp: 90 Tablet, Rfl: 1    cholecalciferol (VITAMIN D3) (5000 Units/125 mcg) tab tablet, Take 5,000 Units by mouth daily. 7000 units daily, Disp: , Rfl:     B6/mfolate/mcobal/ALA/benfotia (EB-N6 DR PO), , Disp: , Rfl:     loratadine (CLARITIN) 10 mg tablet, 1 tab(s), Disp: , Rfl:     ferrous sulfate (IRON) 325 mg (65 mg iron) EC tablet, Take 1 Tablet by mouth Daily (before breakfast). , Disp: 90 Tablet, Rfl: 1    potassium gluconate 550 mg (90 mg) tab, 1 tablet (Patient not taking: No sig reported), Disp: , Rfl:     ascorbate calcium (BUFFERED VITAMIN C PO), , Disp: , Rfl:     cholecalciferol (VITAMIN D3) (2,000 UNITS /50 MCG) cap capsule, , Disp: , Rfl:     GLUTATHIONE, , Disp: , Rfl:     meloxicam (MOBIC) 15 mg tablet, 1 tab(s), Disp: , Rfl:     Zinc Gluconate-Zinc Picolinate 30 mg cap, , Disp: , Rfl:     multivitamin pwpk, , Disp: , Rfl:     magnesium glycinate-mag oxide 120 mg magnesium cap, , Disp: , Rfl:     QUERCETIN DIHYDRATE, BULK,, , Disp: , Rfl:     ubidecarenone/vitamin E mixed (COQ10  PO), , Disp: , Rfl:     calcium carb-vitamin D3-vit K2 (Calcium Plus MenaQ7 Adult) 500 mg calcium- 200 unit-90 mcg tab, , Disp: , Rfl:     BORON, BULK,, , Disp: , Rfl:     OTHER, Take  by mouth six (6) days a week.  prime secretagogue supplement, Disp: , Rfl:     History  Patient Care Team:  Balbir Johnson MD as PCP - General (Family Medicine)  Balbir Johnson MD as PCP - [facility] Provider  Earl Dooley MD (Pulmonary Disease)  Martin Townsend MD (Cardiovascular Disease Physician)    Past Medical History: he has a past medical history of Anal fissure, Arthritis, Asthma, Benign positional vertigo, Hematuria of undiagnosed cause (01/2018), Hypercholesterolemia, Hypothalamic syndrome ([facility] [address] (1969), Muscle weakness, Neuropathy, Pulmonary nodule seen on imaging study (07/12/2016), and Vitamin D deficiency. Past Surgical History: he has a past surgical history that includes hx colonoscopy (01/2018); hx orthopaedic; and hx urological (2018). Family Medical History: family history is not on file. Social History: he reports that he has never smoked. He has never used smokeless tobacco. He reports current alcohol use of about 7.0 standard drinks per week. He reports that he does not use drugs. Objective:     Physical Exam    Constitutional:       General: Not in acute distress. Appearance: Normal appearance. Not ill-appearing,  Not toxic-appearing. HENT:      Head: Normocephalic. Right Ear: External ear normal.      Left Ear: External ear normal.      Nose: Nose normal.   Eyes:      General: No scleral icterus. Right eye: No discharge. Left eye: No discharge. Extraocular Movements: Extraocular movements intact. Conjunctiva/sclera: Conjunctivae normal.   Neck:      Musculoskeletal: Normal range of motion. Pulmonary:      Effort: Pulmonary effort is normal. No respiratory distress. Neurological:      Mental Status: Mental status is at baseline. Psychiatric:         Mood and Affect: Mood normal.         Behavior: Behavior normal.         Thought Content: Thought content normal.         Judgment: Judgment normal.       Assessment & Plan:      ICD-10-CM ICD-9-CM    1. Fatigue, unspecified type  R53.83 780.79       2. Hypercholesterolemia  E78.00 272.0       3. Prediabetes  R73.03 790.29       4. Hypovitaminosis D  E55.9 268.9       5. Iron deficiency anemia, unspecified iron deficiency anemia type  D50.9 280.9       6. Neuropathy  G62.9 355.9       7. Imbalance  R26.89 781.2       8. Weakness of both lower extremities  R29.898 729.89       9. FINESSE on CPAP  G47.33 327.23     Z99.89 V46.8           Neuropathy, imbalance, lower leg weakness: chronic, uncontrolled.  Follow up with specialists as scheduled. Advised to ensure that he reaches out to unemployment office to identify job that does not require walking or prolonged standing. All other conditions listed above: Chronic, stable and/or managed by specialist. Will continue current treatment regimen. Discussed following up with specialist as scheduled. Discussed recommendations for diet and exercise. I was in the office while conducting this encounter. Consent:  He and/or his healthcare decision maker is aware that this patient-initiated Telehealth encounter is a billable service, with coverage as determined by his insurance carrier. He is aware that he may receive a bill and has provided verbal consent to proceed: Yes    This virtual visit was conducted via [address] Pursuant to the emergency declaration under the [facility] waiver authority and the [employer] Act, this Virtual  Visit was conducted to reduce the patient's risk of exposure to COVID-19 and provide continuity of care for an established patient. Services were provided through a video synchronous discussion virtually to substitute for in-person clinic visit. Due to this being a TeleHealth evaluation, many elements of the physical examination are unable to be assessed. Total Time: minutes: 21-30 minutes. I have discussed the diagnosis with the patient and the intended plan as seen in the above orders. The patient has received an after-visit summary along with patient information handout. I have discussed medication side effects and warnings with the patient as well. Disposition  Follow-up and Dispositions    Return in about 4 weeks (around 12/7/2022) for follow up of chronic conditions.            Eulalia Wang MD

## 2022-11-28 ENCOUNTER — OFFICE VISIT (OUTPATIENT)
Dept: NEUROLOGY | Age: 62
End: 2022-11-28
Payer: COMMERCIAL

## 2022-11-28 DIAGNOSIS — G63 POLYNEUROPATHY ASSOCIATED WITH UNDERLYING DISEASE (HCC): ICD-10-CM

## 2022-11-28 DIAGNOSIS — G59 MONONEUROPATHY DUE TO UNDERLYING DISEASE: Primary | ICD-10-CM

## 2022-11-28 PROCEDURE — 95913 NRV CNDJ TEST 13/> STUDIES: CPT | Performed by: PSYCHIATRY & NEUROLOGY

## 2022-11-28 PROCEDURE — 95886 MUSC TEST DONE W/N TEST COMP: CPT | Performed by: PSYCHIATRY & NEUROLOGY

## 2022-11-28 NOTE — PROGRESS NOTES
[facility]  [address], [address] [facility] [address]  Phone [phone] Fax [phone]  Test Date:  2022    Patient: Vance Carrion : 1960 Physician: Madhavi Enrique MD   Sex: Male Height: 6' 1\" Ref Phys: Madhavi Enrique MD   ID#: [id number]  Weight: 266 lbs. Technician: Bipin Guthrie     Patient Complaints:      Patient History / Exam:  51-year-old male with history of hypopituitarism and recently diagnosed diabetes who is being evaluated for numbness in both lower extremities and occasionally in hands. He also has poor balance and has peripheral neuropathy clinically. NCV & EMG Findings:  Evaluation of the left peroneal motor nerve showed reduced amplitude (1.0 mV). The right peroneal motor nerve showed reduced amplitude (2.1 mV) and decreased conduction velocity (B Fib-Ankle, 37 m/s). The left tibial motor nerve showed no response (Knee) and reduced amplitude (1.0 mV). The right tibial motor nerve showed no response (Knee), prolonged distal onset latency (6.8 ms), and reduced amplitude (1.0 mV). The left sural sensory and the right sural sensory nerves showed no response (Calf). All remaining nerves  were within normal limits. Left vs. Right side comparison data for the tibial motor nerve indicates abnormal L-R latency difference (2.9 ms). Needle evaluation of the left anterior tibialis, the left peroneus longus, the left flexor digitorum longus, the right anterior tibialis, the right peroneus longus, and the right flexor digitorum longus muscles showed increased motor unit amplitude. All remaining muscles (as indicated in the following table) showed no evidence of electrical instability. Impression:    The electrodiagnostic testing is suggestive of a moderate to severe, distal, symmetric, length dependent, mainly axonal type of large fiber peripheral neuropathy.   This may be due to underlying metabolic issues/diabetes. No evidence to suggest a lumbosacral radiculopathy on either side.     Recommendations:  Continue with supportive treatment and treatment of underlying issues   Stay physically active/adopt a regular exercise routine    ___________________________  Evelyn Gay MD        Nerve Conduction Studies  Anti Sensory Summary Table     Stim Site NR Peak (ms) Norm Peak (ms) P-T Amp (µV) Norm P-T Amp Onset (ms) Site1 Site2 Delta-P (ms) Dist (cm) Thierry (m/s) Norm Thierry (m/s)   Right Median Anti Sensory (2nd Digit)  32.2°C   Wrist    3.4 <3.6 15.4 >10 2.8 Wrist 2nd Digit 3.4 14.0 41 >39   Right Radial Anti Sensory (Base 1st Digit)  32.7°C   Wrist    1.9 <3.1 25.9  1.6 Wrist Base 1st Digit 1.9 0.0     Left Sup Peroneal Anti Sensory (Ant Lat Mall)  31.9°C   14 cm    2.7 <4.4 6.6 >5.0 2.1 14 cm Ant Lat Mall 2.7 14.0 52 >32   Site 2    2.5  4.2  2.0         Right Sup Peroneal Anti Sensory (Ant Lat Mall)  32.4°C   14 cm    2.8 <4.4 7.0 >5.0 2.1 14 cm Ant Lat Mall 2.8 14.0 50 >32   Left Sural Anti Sensory (Lat Mall)  31.4°C   Calf NR  <4.0  >5.0  Calf Lat Mall  14.0  >35   Right Sural Anti Sensory (Lat Mall)  32°C   Calf NR  <4.0  >5.0  Calf Lat Mall  14.0  >35   Right Ulnar Anti Sensory (5th Digit)  32.7°C   Wrist    2.8 <3.7 15.1 >15.0 2.2 Wrist 5th Digit 2.8 14.0 50 >38     Motor Summary Table     Stim Site NR Onset (ms) Norm Onset (ms) O-P Amp (mV) Norm O-P Amp Site1 Site2 Delta-0 (ms) Dist (cm) Thierry (m/s) Norm Thierry (m/s)   Right Median Motor (Abd Poll Brev)  32.7°C   Wrist    3.5 <4.2 6.0 >5 Elbow Wrist 6.2 31.0 50 >50   Elbow    9.7  5.0          Left Peroneal Motor (Ext Dig Brev)  32.3°C   Ankle    3.5 <6.1 1.0 >2.5 B Fib Ankle 11.0 42.0 38 >38   B Fib    14.5  0.9  Poplt B Fib 2.2 10.0 45 >40   Poplt    16.7  0.7          Right Peroneal Motor (Ext Dig Brev)  32°C   Ankle    3.8 <6.1 2.1 >2.5 B Fib Ankle 11.4 42.0 37 >38   B Fib    15.2  1.4  Poplt B Fib 1.7 10.0 59 >40   Poplt    16.9  0.9 Left Tibial Motor (Abd Amin Brev)  31.7°C   Ankle    3.9 <6.1 1.0 >3.0 Knee Ankle  46.0  >35   Knee NR             Right Tibial Motor (Abd Amin Brev)  32.1°C   Ankle    6.8 <6.1 1.0 >3.0 Knee Ankle  46.0  >35   Knee NR             Right Ulnar Motor (Abd Dig Minimi)  33.5°C   Wrist    3.4 <4.2 5.8 >3 B Elbow Wrist 4.1 22.0 54 >53   B Elbow    7.5  5.7  A Elbow B Elbow 1.9 10.0 53 >53   A Elbow    9.4  4.4            Comparison Summary Table     Stim Site NR Peak (ms) Norm Peak (ms) P-T Amp (µV) Site1 Site2 Delta-P (ms) Norm Delta (ms)   Right Median/Ulnar Palm Comparison (Wrist - 8cm)  33.3°C   Median Palm    2.1 <2.5 22.2 Median Palm Ulnar Palm 0.3 <0.3   Ulnar Palm    1.8 <2.5 9.7         EMG     Side Muscle Nerve Root Ins Act Fibs Psw Amp Dur Poly Recrt Int Pat Comment   Left AntTibialis Dp Br Peronel L4-5 Nml Nml Nml Incr Nml 0 Nml Nml    Left Peroneus Long Sup Br Peronel L5-S1 Nml Nml Nml Incr Nml 0 Nml Nml    Left Gastroc Tibial S1-2 Nml Nml Nml Nml Nml 0 Nml Nml    Left Flex Dig Long Tibial L5-S2 Nml Nml Nml Incr Nml 0 Nml Nml    Left VastusLat Femoral L2-4 Nml Nml Nml Nml Nml 0 Nml Nml    Left Lumbo Parasp Low Rami L5-S1 Nml Nml Nml         Right AntTibialis Dp Br Peronel L4-5 Nml Nml Nml Incr Nml 0 Nml Nml    Right Peroneus Long Sup Br Peronel L5-S1 Nml Nml Nml Incr Nml 0 Nml Nml    Right Gastroc Tibial S1-2 Nml Nml Nml Nml Nml 0 Nml Nml    Right Flex Dig Long Tibial L5-S2 Nml Nml Nml Incr Nml 0 Nml Nml    Right VastusLat Femoral L2-4 Nml Nml Nml Nml Nml 0 Nml Nml    Right Lumbo Parasp Low Rami L5-S1 Nml Nml Nml               Waveforms:

## 2022-12-19 RX ORDER — FERROUS SULFATE 325(65) MG
325 TABLET, DELAYED RELEASE (ENTERIC COATED) ORAL
Qty: 90 TABLET | Refills: 1 | Status: SHIPPED | OUTPATIENT
Start: 2022-12-19

## 2022-12-19 NOTE — TELEPHONE ENCOUNTER
Last Visit: VV 11/9/22 MD Chucho Singletary  Next Appointment: None  Previous Refill Encounter(s): 7/1/22 90 + 1    Requested Prescriptions     Pending Prescriptions Disp Refills    ferrous sulfate (IRON) 325 mg (65 mg iron) EC tablet 90 Tablet 1     Sig: Take 1 Tablet by mouth Daily (before breakfast). For [facility] [facility] in place:   Recommendation Provided To:    Intervention Detail: New Rx: 1, reason: Patient Preference  Gap Closed?:   Intervention Accepted By:   Time Spent (min): 5

## 2023-01-10 DIAGNOSIS — E78.00 HYPERCHOLESTEROLEMIA: Chronic | ICD-10-CM

## 2023-01-10 RX ORDER — ROSUVASTATIN CALCIUM 10 MG/1
TABLET, COATED ORAL
Qty: 90 TABLET | Refills: 1 | Status: SHIPPED | OUTPATIENT
Start: 2023-01-10

## 2023-01-10 NOTE — TELEPHONE ENCOUNTER
[facility] sending refill request 1 month after filling 90 d/s. Technically too soon. Thanks, Anila    Last Visit: VV 11/9/22 MD Tosha Taylor, lipid 4/2022  Next Appointment: None  Previous Refill Encounter(s): 9/19/22 90 + 1 (last refill 12/11/22)    Requested Prescriptions     Pending Prescriptions Disp Refills    rosuvastatin (CRESTOR) 10 mg tablet 90 Tablet 0     Sig: TAKE 1 TAB BY MOUTH NIGHTLY. FOR CHOLESTEROL     For Pharmacy Admin Tracking Only    Program: Medication Refill  CPA in place:   Recommendation Provided To:    Intervention Detail: New Rx: 1, reason: Patient Preference  Intervention Accepted By:   Fabiola Amor Closed?:   Time Spent (min): 5

## 2023-02-06 ENCOUNTER — OFFICE VISIT (OUTPATIENT)
Dept: FAMILY MEDICINE CLINIC | Age: 63
End: 2023-02-06
Payer: COMMERCIAL

## 2023-02-06 VITALS
SYSTOLIC BLOOD PRESSURE: 116 MMHG | DIASTOLIC BLOOD PRESSURE: 77 MMHG | HEART RATE: 76 BPM | RESPIRATION RATE: 16 BRPM | HEIGHT: 73 IN | TEMPERATURE: 99.2 F | WEIGHT: 264 LBS | OXYGEN SATURATION: 98 % | BODY MASS INDEX: 34.99 KG/M2

## 2023-02-06 DIAGNOSIS — E78.00 HYPERCHOLESTEROLEMIA: Primary | ICD-10-CM

## 2023-02-06 DIAGNOSIS — G47.33 OSA ON CPAP: ICD-10-CM

## 2023-02-06 DIAGNOSIS — Z99.89 OSA ON CPAP: ICD-10-CM

## 2023-02-06 DIAGNOSIS — G62.9 NEUROPATHY: ICD-10-CM

## 2023-02-06 DIAGNOSIS — D50.9 IRON DEFICIENCY ANEMIA, UNSPECIFIED IRON DEFICIENCY ANEMIA TYPE: ICD-10-CM

## 2023-02-06 DIAGNOSIS — R29.898 WEAKNESS OF BOTH LOWER EXTREMITIES: ICD-10-CM

## 2023-02-06 DIAGNOSIS — E67.3 HYPERVITAMINOSIS D: ICD-10-CM

## 2023-02-06 DIAGNOSIS — R73.03 PREDIABETES: ICD-10-CM

## 2023-02-06 DIAGNOSIS — E66.9 OBESITY (BMI 30-39.9): ICD-10-CM

## 2023-02-06 DIAGNOSIS — E55.9 HYPOVITAMINOSIS D: ICD-10-CM

## 2023-02-06 NOTE — PROGRESS NOTES
16    Discuss neuropathy and referrals    Chief Complaint   Patient presents with    Follow-up     Neuropathy, referral     1. Have you been to the ER, urgent care clinic since your last visit? Hospitalized since your last visit? No    2. Have you seen or consulted any other health care providers outside of the [address] [place] [address] since your last visit? Include any pap smears or colon screening.  Yes Where: [facility]      Visit Vitals  /77 (BP 1 Location: Left upper arm, BP Patient Position: Sitting, BP Cuff Size: Large adult)   Pulse 76   Temp 99.2 °F (37.3 °C) (Oral)   Resp 16   Ht 6' 1\" (1.854 m)   Wt 264 lb (119.7 kg)   SpO2 98%   BMI 34.83 kg/m²

## 2023-02-06 NOTE — PROGRESS NOTES
Anna Rich  61 y.o. male  1960  [facility] [address] [address]  [id number]     [address]       Encounter Date: 2/6/2023           Established Patient Visit Note: Rebecca Moreno MD    Reason for Appointment:  Chief Complaint   Patient presents with    Follow-up     Neuropathy, referral         History of Present Illness:  History provided by patient    Anna Rich is a 61 y.o. male who presents to clinic today for:     Idiopathic Progressive Polyneuropathy, Upper/lower extremity weakness, imbalance: He is followed by neurology (Dr. Eve Antunez) and Rheumatology (Dr. Jacinto Mann). His last EMG on 11/28/22 showed moderate to severe, distal, symmetric, length dependent, mainly axonal type of large fiber peripheral neuropathy. Recommendations to continue supportive treatment and treatment of underlying issues. Labs on 7/20/22 with B12 of 558 and Folate of > 20, normal protein electrophoresis, TSH 1.19, , and Iron Sat of 37. Claudication: ABIs performed through cardiology (Dr. Abbi Ivy) that were normal.   Prediabetes: last A1c was 5.7 on 10/4/22. He is working to minimize excess surger. Vitamin D Deficiency: he is taking Vit D 7000 daily; his last vit d was 46 on 10/4/22  He reports that he has been taking iron supplementation (325 daily)  Fatigue: He reports that this has been a little more noticeable recently. He reports falling asleep when he sits down. He has also been sleeping longer at night. He reports that he discussed this with sleep medicine, who did not identify any issue with CPAP. He denies any concern for depression. FINESSE on CPAP: managed by PAR; he reports good compliance with CPAP  HLD: tolerating Crestor with CoQ10 and magnesium  Occupational Asthma: his wife reports as related to history of freon exposure; followed by pulmonology.    Obesity: He continues to work on diet and exercise     HM  He declines COVID vaccine or Flu Vaccine  Shingles vaccine: advised to get at pharmacy           Review of Systems  All other ROS were reviewed and are negative except as discussed in HPI         Allergies: Cortisone (hydrocortisone) [hydrocortisone] and Zocor [simvastatin]    Medications:     Current Outpatient Medications:     rosuvastatin (CRESTOR) 10 mg tablet, TAKE 1 TAB BY MOUTH NIGHTLY. FOR CHOLESTEROL, Disp: 90 Tablet, Rfl: 1    ferrous sulfate (IRON) 325 mg (65 mg iron) EC tablet, Take 1 Tablet by mouth Daily (before breakfast). , Disp: 90 Tablet, Rfl: 1    albuterol (ProAir HFA) 90 mcg/actuation inhaler, INHALE 2 INHALATIONS BY MOUTH EVERY 6 HOURS AS NEEDED FOR WHEEZING OR SHORTNESS OF BREATH, Disp: 1 Each, Rfl: 1    DULoxetine (CYMBALTA) 30 mg capsule, , Disp: , Rfl:     cholecalciferol (VITAMIN D3) (5000 Units/125 mcg) tab tablet, Take 5,000 Units by mouth daily. 7000 units daily, Disp: , Rfl:     B6/mfolate/mcobal/ALA/benfotia (EB-N6 DR PO), , Disp: , Rfl:     loratadine (CLARITIN) 10 mg tablet, 1 tab(s), Disp: , Rfl:     ascorbate calcium (BUFFERED VITAMIN C PO), , Disp: , Rfl:     cholecalciferol (VITAMIN D3) (2,000 UNITS /50 MCG) cap capsule, , Disp: , Rfl:     GLUTATHIONE, , Disp: , Rfl:     meloxicam (MOBIC) 15 mg tablet, 1 tab(s), Disp: , Rfl:     Zinc Gluconate-Zinc Picolinate 30 mg cap, , Disp: , Rfl:     multivitamin pwpk, , Disp: , Rfl:     magnesium glycinate-mag oxide 120 mg magnesium cap, , Disp: , Rfl:     QUERCETIN DIHYDRATE, BULK,, , Disp: , Rfl:     ubidecarenone/vitamin E mixed (COQ10  PO), , Disp: , Rfl:     calcium carb-vitamin D3-vit K2 (Calcium Plus MenaQ7 Adult) 500 mg calcium- 200 unit-90 mcg tab, , Disp: , Rfl:     BORON, BULK,, , Disp: , Rfl:     OTHER, Take  by mouth six (6) days a week.  prime secretagogue supplement, Disp: , Rfl:     potassium gluconate 550 mg (90 mg) tab, 1 tablet (Patient not taking: No sig reported), Disp: , Rfl:     History  Patient Care Team:  Damion Moreira MD as PCP - General (Family Medicine)  Sparta Knee MD MCKAYLA as PCP - [facility]  Marisabel Soriano MD (Pulmonary Disease)  Roxanna Andino MD (Cardiovascular Disease Physician)    Past Medical History: he has a past medical history of Anal fissure, Arthritis, Asthma, Benign positional vertigo, Hematuria of undiagnosed cause (01/2018), Hypercholesterolemia, Hypothalamic syndrome ([address] (1969), Muscle weakness, Neuropathy, Pulmonary nodule seen on imaging study (07/12/2016), and Vitamin D deficiency. Past Surgical History: he has a past surgical history that includes hx colonoscopy (01/2018); hx orthopaedic; and hx urological (2018). Family Medical History: family history is not on file. Social History: he reports that he has never smoked. He has never used smokeless tobacco. He reports current alcohol use of about 7.0 standard drinks per week. He reports that he does not use drugs. Objective:   Visit Vitals  /77 (BP 1 Location: Left upper arm, BP Patient Position: Sitting, BP Cuff Size: Large adult)   Pulse 76   Temp 99.2 °F (37.3 °C) (Oral)   Resp 16   Ht 6' 1\" (1.854 m)   Wt 264 lb (119.7 kg)   SpO2 98%   BMI 34.83 kg/m²     Wt Readings from Last 3 Encounters:   02/06/23 264 lb (119.7 kg)   11/07/22 266 lb (120.7 kg)   10/04/22 271 lb 3.2 oz (123 kg)       Physical Exam  Constitutional:       Appearance: Normal appearance. HENT:      Head: Normocephalic and atraumatic. Right Ear: External ear normal.      Left Ear: External ear normal.      Nose: Nose normal.      Mouth/Throat:      Pharynx: No oropharyngeal exudate. Eyes:      General: No scleral icterus. Right eye: No discharge. Left eye: No discharge. Conjunctiva/sclera: Conjunctivae normal.      Pupils: Pupils are equal, round, and reactive to light. Neck:      Thyroid: No thyromegaly. Vascular: No JVD. Trachea: No tracheal deviation. Cardiovascular:      Rate and Rhythm: Normal rate and regular rhythm.       Heart sounds: Normal heart sounds. No murmur heard. No friction rub. No gallop. Pulmonary:      Effort: Pulmonary effort is normal. No respiratory distress. Breath sounds: Normal breath sounds. No stridor. No wheezing. Musculoskeletal:         General: No tenderness or deformity. Normal range of motion. Cervical back: Normal range of motion and neck supple. Lymphadenopathy:      Cervical: No cervical adenopathy. Skin:     General: Skin is warm and dry. Neurological:      Mental Status: He is alert. Cranial Nerves: No cranial nerve deficit. Coordination: Coordination normal.      Gait: Gait is intact. Deep Tendon Reflexes: Reflexes normal.       Assessment & Plan:      ICD-10-CM ICD-9-CM    1. Hypercholesterolemia  E78.00 272.0 CBC W/O DIFF      LIPID PANEL      METABOLIC PANEL, COMPREHENSIVE      URINALYSIS W/ RFLX MICROSCOPIC      TSH 3RD GENERATION      TSH 3RD GENERATION      URINALYSIS W/ RFLX MICROSCOPIC      METABOLIC PANEL, COMPREHENSIVE      LIPID PANEL      CBC W/O DIFF      2. Prediabetes  R73.03 790.29 HEMOGLOBIN A1C WITH EAG      HEMOGLOBIN A1C WITH EAG      3. Hypovitaminosis D  E55.9 268.9 VITAMIN D, 25 HYDROXY      VITAMIN D, 25 HYDROXY      4. Iron deficiency anemia, unspecified iron deficiency anemia type  D50.9 280.9       5. Neuropathy  G62.9 355.9       6. FINESSE on CPAP  G47.33 327.23     Z99.89 V46.8       7. Weakness of both lower extremities  R29.898 729.89       8. Obesity (BMI 30-39. 9)  E66.9 278.00       9. Hypervitaminosis D  E67.3 278.4         Idiopathic Progressive Polyneuropathy, Upper/lower extremity weakness, imbalance: Chronic, uncontrolled. Advised continued follow up with specialists. Discussed red flag symptoms and reasons to call or go to ED  All other conditions listed above: Chronic, stable and/or managed by specialist. Will continue current treatment regimen. Will check labs as reflected above. Discussed following up with specialist as scheduled.  Discussed recommendations for diet and exercise. I have discussed the diagnosis with the patient and the intended plan as seen in the above orders. The patient has received an after-visit summary along with patient information handout. I have discussed medication side effects and warnings with the patient as well. Disposition  Follow-up and Dispositions    Return in about 3 months (around 5/6/2023) for follow up of chronic conditions.            Loan Arellano MD

## 2023-02-08 LAB
25(OH)D3+25(OH)D2 SERPL-MCNC: 63.4 NG/ML (ref 30–100)
ALBUMIN SERPL-MCNC: 4.1 G/DL (ref 3.8–4.8)
ALBUMIN/GLOB SERPL: 1.5 {RATIO} (ref 1.2–2.2)
ALP SERPL-CCNC: 78 IU/L (ref 44–121)
ALT SERPL-CCNC: 19 IU/L (ref 0–44)
APPEARANCE UR: CLEAR
AST SERPL-CCNC: 22 IU/L (ref 0–40)
BACTERIA #/AREA URNS HPF: NORMAL /[HPF]
BILIRUB SERPL-MCNC: 0.3 MG/DL (ref 0–1.2)
BILIRUB UR QL STRIP: NEGATIVE
BUN SERPL-MCNC: 13 MG/DL (ref 8–27)
BUN/CREAT SERPL: 11 (ref 10–24)
CALCIUM SERPL-MCNC: 9.3 MG/DL (ref 8.6–10.2)
CASTS URNS QL MICRO: NORMAL /LPF
CHLORIDE SERPL-SCNC: 104 MMOL/L (ref 96–106)
CHOLEST SERPL-MCNC: 143 MG/DL (ref 100–199)
CO2 SERPL-SCNC: 23 MMOL/L (ref 20–29)
COLOR UR: YELLOW
CREAT SERPL-MCNC: 1.2 MG/DL (ref 0.76–1.27)
EGFRCR SERPLBLD CKD-EPI 2021: 68 ML/MIN/1.73
EPI CELLS #/AREA URNS HPF: NORMAL /HPF (ref 0–10)
ERYTHROCYTE [DISTWIDTH] IN BLOOD BY AUTOMATED COUNT: 12.7 % (ref 11.6–15.4)
EST. AVERAGE GLUCOSE BLD GHB EST-MCNC: 117 MG/DL
GLOBULIN SER CALC-MCNC: 2.7 G/DL (ref 1.5–4.5)
GLUCOSE SERPL-MCNC: 96 MG/DL (ref 70–99)
GLUCOSE UR QL STRIP: NEGATIVE
HBA1C MFR BLD: 5.7 % (ref 4.8–5.6)
HCT VFR BLD AUTO: 38.9 % (ref 37.5–51)
HDLC SERPL-MCNC: 43 MG/DL
HGB BLD-MCNC: 13.2 G/DL (ref 13–17.7)
HGB UR QL STRIP: NEGATIVE
IMP & REVIEW OF LAB RESULTS: NORMAL
KETONES UR QL STRIP: NEGATIVE
LDLC SERPL CALC-MCNC: 75 MG/DL (ref 0–99)
LEUKOCYTE ESTERASE UR QL STRIP: ABNORMAL
MCH RBC QN AUTO: 33.8 PG (ref 26.6–33)
MCHC RBC AUTO-ENTMCNC: 33.9 G/DL (ref 31.5–35.7)
MCV RBC AUTO: 100 FL (ref 79–97)
MICRO URNS: ABNORMAL
NITRITE UR QL STRIP: NEGATIVE
PH UR STRIP: 5.5 [PH] (ref 5–7.5)
PLATELET # BLD AUTO: 259 X10E3/UL (ref 150–450)
POTASSIUM SERPL-SCNC: 4.5 MMOL/L (ref 3.5–5.2)
PROT SERPL-MCNC: 6.8 G/DL (ref 6–8.5)
PROT UR QL STRIP: NEGATIVE
RBC # BLD AUTO: 3.9 X10E6/UL (ref 4.14–5.8)
RBC #/AREA URNS HPF: NORMAL /HPF (ref 0–2)
SODIUM SERPL-SCNC: 142 MMOL/L (ref 134–144)
SP GR UR STRIP: 1.02 (ref 1–1.03)
TRIGL SERPL-MCNC: 144 MG/DL (ref 0–149)
TSH SERPL DL<=0.005 MIU/L-ACNC: 1.65 UIU/ML (ref 0.45–4.5)
UROBILINOGEN UR STRIP-MCNC: 0.2 MG/DL (ref 0.2–1)
VLDLC SERPL CALC-MCNC: 25 MG/DL (ref 5–40)
WBC # BLD AUTO: 6.9 X10E3/UL (ref 3.4–10.8)
WBC #/AREA URNS HPF: NORMAL /HPF (ref 0–5)

## 2023-02-09 ENCOUNTER — PATIENT MESSAGE (OUTPATIENT)
Dept: FAMILY MEDICINE CLINIC | Age: 63
End: 2023-02-09

## 2023-02-09 DIAGNOSIS — R71.8 ELEVATED MCV: Primary | ICD-10-CM

## 2023-02-09 NOTE — PROGRESS NOTES
Please let patient know that his MCV (size of red blood cell) is a little elevated from last check. Please confirm that he is taking a B12 supplement. Please ascertain the dosing. Please let him know that the remainder of his labs are stable.

## 2023-02-09 NOTE — PROGRESS NOTES
Called patient Two pt identifiers confirmed. Deliver message regarding labs also regarding vitamin B 12 message, patient stated is not taking any B 12  but he is taking B complex gums with vitamin C, he will upload the pictures of bottles to let you know how much he is taking.

## 2023-02-15 ENCOUNTER — TELEPHONE (OUTPATIENT)
Dept: NEUROLOGY | Age: 63
End: 2023-02-15

## 2023-02-17 ENCOUNTER — PATIENT MESSAGE (OUTPATIENT)
Dept: FAMILY MEDICINE CLINIC | Age: 63
End: 2023-02-17

## 2023-02-17 NOTE — LETTER
2/23/2023 10:37 AM    Patient Information:  Mr. Leandro Colunga  [address]  [place] [address]      To Whom It May Concern,    Leandro Colunga has requested that we complete long term disability paperwork. I advised him that our office does not perform long-term disability evaluations. I advised reaching out to provider who is able to perform this evaluation.        Sincerely,        Jennifer Jacobs MD

## 2023-02-23 RX ORDER — LANOLIN ALCOHOL/MO/W.PET/CERES
500 CREAM (GRAM) TOPICAL DAILY
Qty: 90 TABLET | Refills: 3 | Status: SHIPPED | OUTPATIENT
Start: 2023-02-23

## 2023-02-23 NOTE — TELEPHONE ENCOUNTER
From: Starleen Cabot  To: Ericka Enriquez MD  Sent: 2/9/2023 5:31 PM EST  Subject: B-12 Complex Supplement    This message is being sent by Paxton Fox on behalf of Starleen Cabot. Good afternoon,     Here are photos of the B Complex + C Eli Szymanski just started taking 2 days ago. We ran out of his previous B Complex supplement and had not had any for almost a year prior to his 2/7/23 blood draw. Hope this helps. Thanks! Misty VALERO

## 2023-02-23 NOTE — TELEPHONE ENCOUNTER
Makenzie Godinez 2/17/2023 8:21 AM EST      ----- Message -----  From: Kenia Jones  Sent: 2/17/2023 5:07 AM EST  To: [facility] Nurse Pool  Subject: Follow Up on STD Info Request     This message is being sent by Yo Romero on behalf of Kenia Jones. Attaching the 6th of 6 pages of the [employer] Physical Capabilities Evaluation.

## 2023-02-23 NOTE — TELEPHONE ENCOUNTER
Called patient and reviewed B12 supplementation. Patient currently taking 30mcg of B12. Given elevated MCV, will add 500mcg tablet to take daily. Advised continued monitoring.      Winnie Garcia MD

## 2023-02-23 NOTE — TELEPHONE ENCOUNTER
Called and discussed further with patient. Advised that we do not perform long term disability evaluation. Advised that he will need to reach out to provider who performs these evaluations.      Brittany Saldana MD

## 2023-06-27 ENCOUNTER — TELEPHONE (OUTPATIENT)
Age: 63
End: 2023-06-27

## 2023-06-27 ENCOUNTER — PATIENT MESSAGE (OUTPATIENT)
Age: 63
End: 2023-06-27

## 2023-06-27 NOTE — TELEPHONE ENCOUNTER
----- Message from Phillip Barlow, [address] sent at 6/27/2023  7:41 AM EDT -----  Regarding: FW: Need New PCP  Contact: [phone]    ----- Message -----  From: Kirill Valencia  Sent: 6/27/2023   1:12 AM EDT  To: [facility] Clinical Staff  Subject: Need New PCP                                     This message is being sent by Fransico Dan on behalf of Kirill Valencia. Good evening,    Faulkner Jen was a patient of Dr. Kolton Horvath. At his last appointment on 2/6/23, he recommended Lucie Li schedule a 3-month follow-up and new patient establishment appointment with Dr. Howard Nunes. Would you have someone call Lucie Li at [phone] to schedule this appointment? Thank you for your help!     Bev Melbourne Frankel

## 2023-06-29 RX ORDER — LANOLIN ALCOHOL/MO/W.PET/CERES
325 CREAM (GRAM) TOPICAL
Qty: 90 TABLET | Refills: 0 | Status: SHIPPED | OUTPATIENT
Start: 2023-06-29

## 2023-09-04 SDOH — ECONOMIC STABILITY: FOOD INSECURITY: WITHIN THE PAST 12 MONTHS, THE FOOD YOU BOUGHT JUST DIDN'T LAST AND YOU DIDN'T HAVE MONEY TO GET MORE.: NEVER TRUE

## 2023-09-04 SDOH — ECONOMIC STABILITY: INCOME INSECURITY: HOW HARD IS IT FOR YOU TO PAY FOR THE VERY BASICS LIKE FOOD, HOUSING, MEDICAL CARE, AND HEATING?: SOMEWHAT HARD

## 2023-09-04 SDOH — ECONOMIC STABILITY: FOOD INSECURITY: WITHIN THE PAST 12 MONTHS, YOU WORRIED THAT YOUR FOOD WOULD RUN OUT BEFORE YOU GOT MONEY TO BUY MORE.: NEVER TRUE

## 2023-09-04 SDOH — ECONOMIC STABILITY: TRANSPORTATION INSECURITY
IN THE PAST 12 MONTHS, HAS LACK OF TRANSPORTATION KEPT YOU FROM MEETINGS, WORK, OR FROM GETTING THINGS NEEDED FOR DAILY LIVING?: NO

## 2023-09-04 SDOH — ECONOMIC STABILITY: HOUSING INSECURITY
IN THE LAST 12 MONTHS, WAS THERE A TIME WHEN YOU DID NOT HAVE A STEADY PLACE TO SLEEP OR SLEPT IN A SHELTER (INCLUDING NOW)?: NO

## 2023-09-07 ENCOUNTER — OFFICE VISIT (OUTPATIENT)
Age: 63
End: 2023-09-07
Payer: COMMERCIAL

## 2023-09-07 VITALS
WEIGHT: 263.4 LBS | TEMPERATURE: 96.8 F | SYSTOLIC BLOOD PRESSURE: 98 MMHG | OXYGEN SATURATION: 96 % | HEART RATE: 73 BPM | BODY MASS INDEX: 34.91 KG/M2 | RESPIRATION RATE: 18 BRPM | HEIGHT: 73 IN | DIASTOLIC BLOOD PRESSURE: 70 MMHG

## 2023-09-07 DIAGNOSIS — Z28.21 REFUSED INFLUENZA VACCINE: ICD-10-CM

## 2023-09-07 DIAGNOSIS — Z28.21 REFUSED VARICELLA VACCINE: ICD-10-CM

## 2023-09-07 DIAGNOSIS — G47.33 OSA ON CPAP: ICD-10-CM

## 2023-09-07 DIAGNOSIS — J45.20 MILD INTERMITTENT OCCUPATIONAL ASTHMA WITHOUT COMPLICATION: ICD-10-CM

## 2023-09-07 DIAGNOSIS — E61.1 IRON DEFICIENCY: ICD-10-CM

## 2023-09-07 DIAGNOSIS — Z57.9 MILD INTERMITTENT OCCUPATIONAL ASTHMA WITHOUT COMPLICATION: ICD-10-CM

## 2023-09-07 DIAGNOSIS — E78.00 HYPERCHOLESTEROLEMIA: ICD-10-CM

## 2023-09-07 DIAGNOSIS — G60.3 IDIOPATHIC PROGRESSIVE POLYNEUROPATHY: Primary | ICD-10-CM

## 2023-09-07 DIAGNOSIS — J30.1 SEASONAL ALLERGIC RHINITIS DUE TO POLLEN: ICD-10-CM

## 2023-09-07 DIAGNOSIS — R73.09 ELEVATED HEMOGLOBIN A1C: ICD-10-CM

## 2023-09-07 DIAGNOSIS — E55.9 VITAMIN D DEFICIENCY: ICD-10-CM

## 2023-09-07 DIAGNOSIS — Z99.89 OSA ON CPAP: ICD-10-CM

## 2023-09-07 PROCEDURE — 99215 OFFICE O/P EST HI 40 MIN: CPT | Performed by: STUDENT IN AN ORGANIZED HEALTH CARE EDUCATION/TRAINING PROGRAM

## 2023-09-07 RX ORDER — GABAPENTIN 300 MG/1
CAPSULE ORAL
COMMUNITY
Start: 2023-08-27

## 2023-09-07 SDOH — ECONOMIC STABILITY: INCOME INSECURITY: HOW HARD IS IT FOR YOU TO PAY FOR THE VERY BASICS LIKE FOOD, HOUSING, MEDICAL CARE, AND HEATING?: NOT HARD AT ALL

## 2023-09-07 SDOH — ECONOMIC STABILITY: FOOD INSECURITY: WITHIN THE PAST 12 MONTHS, THE FOOD YOU BOUGHT JUST DIDN'T LAST AND YOU DIDN'T HAVE MONEY TO GET MORE.: NEVER TRUE

## 2023-09-07 SDOH — HEALTH STABILITY - PHYSICAL HEALTH: OCCUPATIONAL EXPOSURE TO UNSPECIFIED RISK FACTOR: Z57.9

## 2023-09-07 SDOH — ECONOMIC STABILITY: FOOD INSECURITY: WITHIN THE PAST 12 MONTHS, YOU WORRIED THAT YOUR FOOD WOULD RUN OUT BEFORE YOU GOT MONEY TO BUY MORE.: NEVER TRUE

## 2023-09-07 ASSESSMENT — PATIENT HEALTH QUESTIONNAIRE - PHQ9
SUM OF ALL RESPONSES TO PHQ QUESTIONS 1-9: 0
SUM OF ALL RESPONSES TO PHQ9 QUESTIONS 1 & 2: 0
1. LITTLE INTEREST OR PLEASURE IN DOING THINGS: 0
2. FEELING DOWN, DEPRESSED OR HOPELESS: 0

## 2023-09-07 NOTE — PROGRESS NOTES
Joaquin Castro  61 y.o. male  1960  [address] [facility] [address]  [id number]     [address] [facility]       Chief Complaint:  Chief Complaint   Patient presents with    Establish Care   Source: self,  the medical record     Subjective  Joaquin Castro is an 61 y.o. male who presents for followup of chronic conditions/ est are with new PCP: for:    Idiopathic Progressive Polyneuropathy, Upper/lower extremity weakness, imbalance: He is primarily followed by  Don Davidson DPM, who manages him on gabapentin 300mg QHS + cymblata 30mg BD. Has seen neurology - Dr Yoav Childers and previously Dr Kaylynn Cha. Has routine followup with Dr Bonnie Lieebrman. about every 4-5 months - next appt is 10/2023. Requested SANJEEV today. EMG study 11/2022 system show moderate-severe distal symmetric large fiber peripheral neuropathy. He jazmin not have DM/ MRI 10/2022 without any foraminal narrowing nor stenosis. Has also had b/l LOGAN which were clear. B12 levels stable, TSH WNL  Iron deficiency: takes daily iron supplement, last HGB WNL; no constipation   AR: claritin daily  Prediabetes: last A1c was 5.7 on 2/7/23 (stable) He is working to minimize excess surger. Vitamin D Deficiency: he is taking Vit D 5000 daily; his last vit d was 52 on 10/4/22  OLY on CPAP: managed by Mary Bridges; he reports good compliance with CPAP  HLD: tolerating Crestor 10mg with CoQ10 and magnesium. Last LDL 75 2/7/23 at goal. No muscle aches  Occupational Asthma: his wife reports as related to history of freon exposure; followed by pulmonology; [place]; uses albuterol maybe once every 3 months. Feels good during fall and winter but if too cold bothers him and the heat is the most exacerbating in addition irritants in the air        ROS  Negative except what is mentioned in HPI    Allergies - reviewed:    Allergies   Allergen Reactions    Hydrocortisone Dermatitis     Reaction with eyes    Simvastatin Other (See Comments)     Bilateral leg cramps

## 2023-09-28 RX ORDER — LANOLIN ALCOHOL/MO/W.PET/CERES
325 CREAM (GRAM) TOPICAL
Qty: 90 TABLET | Refills: 1 | Status: SHIPPED | OUTPATIENT
Start: 2023-09-28

## 2023-09-28 NOTE — TELEPHONE ENCOUNTER
Last appointment: 9/7/23  Next appointment: Carmenza Hidalgo to follow-up 3/7/24  Previous refill encounter(s): 6/29/23 #90    Requested Prescriptions     Pending Prescriptions Disp Refills    ferrous sulfate (FE TABS 325) 325 (65 Fe) MG EC tablet 90 tablet 1     Sig: Take 1 tablet by mouth every morning (before breakfast)         For Pharmacy Admin Tracking Only    Program: Medication Refill  CPA in place:    Recommendation Provided To:    Intervention Detail: New Rx: 1, reason: Patient Preference  Intervention Accepted By:   Clifton Kim Closed?:    Time Spent (min): 5

## 2023-10-30 RX ORDER — ROSUVASTATIN CALCIUM 10 MG/1
10 TABLET, COATED ORAL NIGHTLY
Qty: 90 TABLET | Refills: 1 | Status: SHIPPED | OUTPATIENT
Start: 2023-10-30

## 2023-10-30 NOTE — TELEPHONE ENCOUNTER
Last appointment: 9/7/23 MD Samra Pearce, lipid 2/2023  Next appointment: None  Previous refill encounter(s): 1/10/23 90 + 1    Requested Prescriptions     Pending Prescriptions Disp Refills    rosuvastatin (CRESTOR) 10 MG tablet 90 tablet 1     Sig: Take 1 tablet by mouth nightly For cholesterol     For Pharmacy Admin Tracking Only    Program: Medication Refill  CPA in place:    Recommendation Provided To:    Intervention Detail: New Rx: 1, reason: Patient Preference  Intervention Accepted By:   Tammie Carroll?:    Time Spent (min): 5

## 2024-02-02 ENCOUNTER — TELEPHONE (OUTPATIENT)
Age: 64
End: 2024-02-02

## 2024-02-02 NOTE — TELEPHONE ENCOUNTER
----- Message from Jennifer Zuñiga LPN sent at 2/2/2024  8:27 AM EST -----  Regarding: FW: Need a new PCP  Contact: [phone]    ----- Message -----  From: Nadir Louie  Sent: 2/2/2024   1:32 AM EST  To: [facility] Clinical Staff  Subject: Need a new PCP                                   We received a letter indicating Dr. Mcgarry will be transferring to your [place] office.    Nadir needs to schedule a physical in March 2024, but we'd like to keep going to the [place] location as we live right across the street from that office.     Based on the list of available PCPs noted in the letter, we'd like to schedule his March 2024 appointment with Dr. Debra Jean.    Thank you for your help. We weren't able to use [employer] to schedule.     Susan & Nadir Louie

## 2024-03-27 NOTE — TELEPHONE ENCOUNTER
Last appointment: 9/7/23 Zeferino  Next appointment: 5/23/24 Miranda  Previous refill encounter(s): 9/28/23 90 + 1    Requested Prescriptions     Pending Prescriptions Disp Refills    ferrous sulfate (FE TABS 325) 325 (65 Fe) MG EC tablet 90 tablet 0     Sig: Take 1 tablet by mouth every morning (before breakfast)     For Pharmacy Admin Tracking Only    Program: Medication Refill  CPA in place:    Recommendation Provided To:   Intervention Detail: New Rx: 1, reason: Patient Preference  Intervention Accepted By:   Gap Closed?:    Time Spent (min): 5

## 2024-03-28 RX ORDER — LANOLIN ALCOHOL/MO/W.PET/CERES
325 CREAM (GRAM) TOPICAL
Qty: 90 TABLET | Refills: 0 | Status: SHIPPED | OUTPATIENT
Start: 2024-03-28

## 2024-04-22 NOTE — TELEPHONE ENCOUNTER
PCP: Santa Mcgarry MD    Last appt: 9/7/2023     Future Appointments   Date Time Provider Department Center   5/23/2024  9:00 AM Debra Jean, APRN - NP [facility]       Requested Prescriptions     Pending Prescriptions Disp Refills    rosuvastatin (CRESTOR) 10 MG tablet [Pharmacy Med Name: ROSUVASTATIN CALCIUM 10 MG TAB] 90 tablet 1     Sig: TAKE 1 TABLET BY MOUTH NIGHTLY FOR CHOLESTEROL       Prior labs and Blood pressures:  BP Readings from Last 3 Encounters:   09/07/23 98/70   02/06/23 116/77   11/07/22 118/74     Lab Results   Component Value Date/Time     02/07/2023 12:00 AM    K 4.5 02/07/2023 12:00 AM     02/07/2023 12:00 AM    CO2 23 02/07/2023 12:00 AM    BUN 13 02/07/2023 12:00 AM    GFRAA >60 11/26/2021 12:28 PM     No results found for: \"HBA1C\", \"WTC5DIME\"  Lab Results   Component Value Date/Time    CHOL 143 02/07/2023 12:00 AM    HDL 43 02/07/2023 12:00 AM    VLDL 25 02/07/2023 12:00 AM     No results found for: \"VITD3\", \"VD3RIA\"    Lab Results   Component Value Date/Time    TSH 1.650 02/07/2023 12:00 AM

## 2024-04-23 RX ORDER — ROSUVASTATIN CALCIUM 10 MG/1
10 TABLET, COATED ORAL NIGHTLY
Qty: 90 TABLET | Refills: 0 | Status: SHIPPED | OUTPATIENT
Start: 2024-04-23

## 2024-05-23 ENCOUNTER — OFFICE VISIT (OUTPATIENT)
Age: 64
End: 2024-05-23
Payer: COMMERCIAL

## 2024-05-23 VITALS
RESPIRATION RATE: 16 BRPM | TEMPERATURE: 97.8 F | DIASTOLIC BLOOD PRESSURE: 68 MMHG | OXYGEN SATURATION: 96 % | WEIGHT: 261.2 LBS | SYSTOLIC BLOOD PRESSURE: 106 MMHG | HEART RATE: 67 BPM | BODY MASS INDEX: 34.46 KG/M2

## 2024-05-23 DIAGNOSIS — Z57.9 MILD INTERMITTENT OCCUPATIONAL ASTHMA WITHOUT COMPLICATION: ICD-10-CM

## 2024-05-23 DIAGNOSIS — G60.3 IDIOPATHIC PROGRESSIVE POLYNEUROPATHY: Primary | ICD-10-CM

## 2024-05-23 DIAGNOSIS — E61.1 IRON DEFICIENCY: ICD-10-CM

## 2024-05-23 DIAGNOSIS — R73.03 PREDIABETES: ICD-10-CM

## 2024-05-23 DIAGNOSIS — E55.9 HYPOVITAMINOSIS D: ICD-10-CM

## 2024-05-23 DIAGNOSIS — J45.20 MILD INTERMITTENT OCCUPATIONAL ASTHMA WITHOUT COMPLICATION: ICD-10-CM

## 2024-05-23 DIAGNOSIS — Z12.5 PROSTATE CANCER SCREENING: ICD-10-CM

## 2024-05-23 DIAGNOSIS — G47.33 OBSTRUCTIVE SLEEP APNEA (ADULT) (PEDIATRIC): ICD-10-CM

## 2024-05-23 DIAGNOSIS — E78.00 HYPERCHOLESTEROLEMIA: ICD-10-CM

## 2024-05-23 PROCEDURE — 99214 OFFICE O/P EST MOD 30 MIN: CPT | Performed by: NURSE PRACTITIONER

## 2024-05-23 RX ORDER — UREA 10 %
325 LOTION (ML) TOPICAL
Qty: 90 TABLET | Refills: 0 | Status: SHIPPED | OUTPATIENT
Start: 2024-05-23

## 2024-05-23 RX ORDER — ROSUVASTATIN CALCIUM 10 MG/1
10 TABLET, COATED ORAL NIGHTLY
Qty: 90 TABLET | Refills: 0 | Status: SHIPPED | OUTPATIENT
Start: 2024-05-23

## 2024-05-23 RX ORDER — ALBUTEROL SULFATE 90 UG/1
2 AEROSOL, METERED RESPIRATORY (INHALATION) EVERY 4 HOURS PRN
Qty: 18 G | Refills: 1 | Status: SHIPPED | OUTPATIENT
Start: 2024-05-23

## 2024-05-23 SDOH — HEALTH STABILITY: PHYSICAL HEALTH
ON AVERAGE, HOW MANY DAYS PER WEEK DO YOU ENGAGE IN MODERATE TO STRENUOUS EXERCISE (LIKE A BRISK WALK)?: PATIENT DECLINED

## 2024-05-23 SDOH — HEALTH STABILITY - PHYSICAL HEALTH: OCCUPATIONAL EXPOSURE TO UNSPECIFIED RISK FACTOR: Z57.9

## 2024-05-23 ASSESSMENT — PATIENT HEALTH QUESTIONNAIRE - PHQ9
SUM OF ALL RESPONSES TO PHQ QUESTIONS 1-9: 0
SUM OF ALL RESPONSES TO PHQ QUESTIONS 1-9: 0
2. FEELING DOWN, DEPRESSED OR HOPELESS: NOT AT ALL
1. LITTLE INTEREST OR PLEASURE IN DOING THINGS: NOT AT ALL
SUM OF ALL RESPONSES TO PHQ9 QUESTIONS 1 & 2: 0
SUM OF ALL RESPONSES TO PHQ QUESTIONS 1-9: 0
SUM OF ALL RESPONSES TO PHQ QUESTIONS 1-9: 0

## 2024-05-23 NOTE — ASSESSMENT & PLAN NOTE
followed by  Amarilis Davis DPM, who manages him on gabapentin  + Cymbalta. Has seen neurology - Dr Galindo and previously Dr Hall.  EMG study 11/2022 system show moderate-severe distal symmetric large fiber peripheral neuropathy. He jazmin not have DM/ MRI 10/2022 without any foraminal narrowing nor stenosis. Has also had b/l LOGAN which were clear.

## 2024-05-23 NOTE — PROGRESS NOTES
Chief Complaint   Patient presents with    Establish Care    Follow-up Chronic Condition       \"Have you been to the ER, urgent care clinic since your last visit?  Hospitalized since your last visit?\"    NO    “Have you seen or consulted any other health care providers outside of [facility] since your last visit?”    5/22/23, 10/12/23, 3/1/24 Amarilis Davis, DPM - Neuropathy treatment; 10/17/23 [facility] - CPAP check; 2/10/24 [facility] Medical Exam       Click Here for Release of Records Request          5/23/2024     9:11 AM   PHQ-9    Little interest or pleasure in doing things 0   Feeling down, depressed, or hopeless 0   PHQ-2 Score 0   PHQ-9 Total Score 0       Health Maintenance Due   Topic Date Due    COVID-19 Vaccine (1) Never done    Shingles vaccine (1 of 2) Never done    Respiratory Syncytial Virus (RSV) Pregnant or age 60 yrs+ (1 - 1-dose 60+ series) Never done    Pneumococcal 0-64 years Vaccine (2 of 2 - PCV) 10/16/2020    A1C test (Diabetic or Prediabetic)  02/07/2024    Lipids  02/07/2024

## 2024-05-23 NOTE — PROGRESS NOTES
[facility]  Clinic Note     Nadir Louie (: 1960) is a 64 y.o. male, established patient, here for evaluation of the following chief complaint(s):  Establish Care and Follow-up Chronic Condition       ASSESSMENT/PLAN:  1. Idiopathic progressive polyneuropathy  Assessment & Plan:   followed by  Amarilis Davis DPM, who manages him on gabapentin  + Cymbalta. Has seen neurology - Dr Galindo and previously Dr Hall.  EMG study 2022 system show moderate-severe distal symmetric large fiber peripheral neuropathy. He jazmin not have DM/ MRI 10/2022 without any foraminal narrowing nor stenosis. Has also had b/l LOGAN which were clear.     2. Iron deficiency  -     ferrous sulfate (FE TABS 325) 325 (65 Fe) MG EC tablet; Take 1 tablet by mouth every morning (before breakfast), Disp-90 tablet, R-0Normal  -     CBC with Auto Differential; Future  -     Iron and TIBC; Future  -     Ferritin; Future  -     Vitamin B12; Future    3. Prediabetes  Comments:  Last A1c 5.7 on 2023  Orders:  -     Hemoglobin A1C; Future    4. Obstructive sleep apnea (adult) (pediatric)  Assessment & Plan:  managed by [facility] - Dr Acuna; he reports good compliance with CPAP     5. Hypercholesterolemia  -     rosuvastatin (CRESTOR) 10 MG tablet; Take 1 tablet by mouth nightly For cholesterol, Disp-90 tablet, R-0Normal  -     Lipid Panel; Future  -     Comprehensive Metabolic Panel; Future  Lab Results   Component Value Date    CHOL 143 2023    TRIG 144 2023    HDL 43 2023    LDL 75 2023    VLDL 25 2023    CHOLHDLRATIO 2.8 2021       6. Hypovitaminosis D  -     Vitamin D 25 Hydroxy; Future    7. Prostate cancer screening  -     PSA Screening; Future  -Last PSA 0.7 on 10/4/2022    8. Mild intermittent occupational asthma without complication  -     albuterol sulfate HFA (PROVENTIL;VENTOLIN;PROAIR) 108 (90 Base) MCG/ACT inhaler; Inhale 2 puffs into the lungs

## 2024-05-24 LAB
25(OH)D3+25(OH)D2 SERPL-MCNC: 57.8 NG/ML (ref 30–100)
ALBUMIN SERPL-MCNC: 4.2 G/DL (ref 3.9–4.9)
ALBUMIN/GLOB SERPL: 1.8 {RATIO} (ref 1.2–2.2)
ALP SERPL-CCNC: 79 IU/L (ref 44–121)
ALT SERPL-CCNC: 20 IU/L (ref 0–44)
AST SERPL-CCNC: 17 IU/L (ref 0–40)
BASOPHILS # BLD AUTO: 0 X10E3/UL (ref 0–0.2)
BASOPHILS NFR BLD AUTO: 1 %
BILIRUB SERPL-MCNC: 0.4 MG/DL (ref 0–1.2)
BUN SERPL-MCNC: 19 MG/DL (ref 8–27)
BUN/CREAT SERPL: 18 (ref 10–24)
CALCIUM SERPL-MCNC: 8.6 MG/DL (ref 8.6–10.2)
CHLORIDE SERPL-SCNC: 104 MMOL/L (ref 96–106)
CHOLEST SERPL-MCNC: 147 MG/DL (ref 100–199)
CO2 SERPL-SCNC: 25 MMOL/L (ref 20–29)
CREAT SERPL-MCNC: 1.08 MG/DL (ref 0.76–1.27)
EGFRCR SERPLBLD CKD-EPI 2021: 77 ML/MIN/1.73
EOSINOPHIL # BLD AUTO: 0.4 X10E3/UL (ref 0–0.4)
EOSINOPHIL NFR BLD AUTO: 7 %
ERYTHROCYTE [DISTWIDTH] IN BLOOD BY AUTOMATED COUNT: 12.4 % (ref 11.6–15.4)
FERRITIN SERPL-MCNC: 301 NG/ML (ref 30–400)
GLOBULIN SER CALC-MCNC: 2.3 G/DL (ref 1.5–4.5)
GLUCOSE SERPL-MCNC: 101 MG/DL (ref 70–99)
HBA1C MFR BLD: 5.8 % (ref 4.8–5.6)
HCT VFR BLD AUTO: 38 % (ref 37.5–51)
HDLC SERPL-MCNC: 45 MG/DL
HGB BLD-MCNC: 12.8 G/DL (ref 13–17.7)
IMM GRANULOCYTES # BLD AUTO: 0 X10E3/UL (ref 0–0.1)
IMM GRANULOCYTES NFR BLD AUTO: 0 %
IMP & REVIEW OF LAB RESULTS: NORMAL
IRON SATN MFR SERPL: 43 % (ref 15–55)
IRON SERPL-MCNC: 98 UG/DL (ref 38–169)
LDLC SERPL CALC-MCNC: 78 MG/DL (ref 0–99)
LYMPHOCYTES # BLD AUTO: 1.3 X10E3/UL (ref 0.7–3.1)
LYMPHOCYTES NFR BLD AUTO: 22 %
MCH RBC QN AUTO: 34 PG (ref 26.6–33)
MCHC RBC AUTO-ENTMCNC: 33.7 G/DL (ref 31.5–35.7)
MCV RBC AUTO: 101 FL (ref 79–97)
MONOCYTES # BLD AUTO: 0.4 X10E3/UL (ref 0.1–0.9)
MONOCYTES NFR BLD AUTO: 7 %
NEUTROPHILS # BLD AUTO: 3.9 X10E3/UL (ref 1.4–7)
NEUTROPHILS NFR BLD AUTO: 63 %
PLATELET # BLD AUTO: 220 X10E3/UL (ref 150–450)
POTASSIUM SERPL-SCNC: 4.7 MMOL/L (ref 3.5–5.2)
PROT SERPL-MCNC: 6.5 G/DL (ref 6–8.5)
PSA SERPL-MCNC: 0.7 NG/ML (ref 0–4)
RBC # BLD AUTO: 3.77 X10E6/UL (ref 4.14–5.8)
SODIUM SERPL-SCNC: 142 MMOL/L (ref 134–144)
TIBC SERPL-MCNC: 230 UG/DL (ref 250–450)
TRIGL SERPL-MCNC: 134 MG/DL (ref 0–149)
UIBC SERPL-MCNC: 132 UG/DL (ref 111–343)
VIT B12 SERPL-MCNC: 1640 PG/ML (ref 232–1245)
VLDLC SERPL CALC-MCNC: 24 MG/DL (ref 5–40)
WBC # BLD AUTO: 6 X10E3/UL (ref 3.4–10.8)

## 2024-07-25 DIAGNOSIS — J45.20 MILD INTERMITTENT OCCUPATIONAL ASTHMA WITHOUT COMPLICATION: ICD-10-CM

## 2024-07-25 DIAGNOSIS — Z57.9 MILD INTERMITTENT OCCUPATIONAL ASTHMA WITHOUT COMPLICATION: ICD-10-CM

## 2024-07-25 RX ORDER — ALBUTEROL SULFATE 90 UG/1
AEROSOL, METERED RESPIRATORY (INHALATION)
Qty: 18 EACH | Refills: 1 | Status: SHIPPED | OUTPATIENT
Start: 2024-07-25

## 2024-07-25 SDOH — HEALTH STABILITY - PHYSICAL HEALTH: OCCUPATIONAL EXPOSURE TO UNSPECIFIED RISK FACTOR: Z57.9

## 2024-07-25 NOTE — TELEPHONE ENCOUNTER
PCP: Debra Jean, APRN - NP    Last appt: 5/23/2024   No future appointments.    Requested Prescriptions     Pending Prescriptions Disp Refills    albuterol sulfate HFA (PROVENTIL;VENTOLIN;PROAIR) 108 (90 Base) MCG/ACT inhaler [Pharmacy Med Name: ALBUTEROL HFA (VENTOLIN) INH] 18 each 1     Sig: INHALE 2 PUFFS BY MOUTH EVERY 4 HOURS AS NEEDED FOR WHEEZE         Prior labs and Blood pressures:  BP Readings from Last 3 Encounters:   05/23/24 106/68   09/07/23 98/70   02/06/23 116/77     Lab Results   Component Value Date/Time     05/23/2024 12:00 AM    K 4.7 05/23/2024 12:00 AM     05/23/2024 12:00 AM    CO2 25 05/23/2024 12:00 AM    BUN 19 05/23/2024 12:00 AM    GFRAA >60 11/26/2021 12:28 PM     No results found for: \"HBA1C\", \"[id number]\"  Lab Results   Component Value Date/Time    CHOL 147 05/23/2024 12:00 AM    HDL 45 05/23/2024 12:00 AM    LDL 78 05/23/2024 12:00 AM    LDL 75 02/07/2023 12:00 AM    VLDL 24 05/23/2024 12:00 AM    VLDL 25 02/07/2023 12:00 AM     No results found for: \"VITD3\"    Lab Results   Component Value Date/Time    TSH 1.650 02/07/2023 12:00 AM

## 2024-11-09 DIAGNOSIS — E61.1 IRON DEFICIENCY: ICD-10-CM

## 2024-11-12 RX ORDER — FERROUS SULFATE 325(65) MG
1 TABLET, DELAYED RELEASE (ENTERIC COATED) ORAL
Qty: 90 TABLET | Refills: 0 | Status: SHIPPED | OUTPATIENT
Start: 2024-11-12

## 2024-11-12 NOTE — TELEPHONE ENCOUNTER
PCP: Debra Jean, APRN - NP    Last appt: 5/23/2024   No future appointments.    Requested Prescriptions     Pending Prescriptions Disp Refills    ferrous sulfate (FE TABS 325) 325 (65 Fe) MG EC tablet [Pharmacy Med Name: FERROUS SULF  MG TABLET] 90 tablet 0     Sig: TAKE 1 TABLET BY MOUTH EVERY DAY BEFORE BREAKFAST         Prior labs and Blood pressures:  BP Readings from Last 3 Encounters:   05/23/24 106/68   09/07/23 98/70   02/06/23 116/77     Lab Results   Component Value Date/Time     05/23/2024 12:00 AM    K 4.7 05/23/2024 12:00 AM     05/23/2024 12:00 AM    CO2 25 05/23/2024 12:00 AM    BUN 19 05/23/2024 12:00 AM    GFRAA >60 11/26/2021 12:28 PM     No results found for: \"HBA1C\", \"[id number]\"  Lab Results   Component Value Date/Time    CHOL 147 05/23/2024 12:00 AM    HDL 45 05/23/2024 12:00 AM    LDL 78 05/23/2024 12:00 AM    LDL 75 02/07/2023 12:00 AM    VLDL 24 05/23/2024 12:00 AM    VLDL 25 02/07/2023 12:00 AM     No results found for: \"VITD3\"    Lab Results   Component Value Date/Time    TSH 1.650 02/07/2023 12:00 AM

## 2025-01-21 ENCOUNTER — PATIENT MESSAGE (OUTPATIENT)
Age: 65
End: 2025-01-21

## 2025-03-11 DIAGNOSIS — E61.1 IRON DEFICIENCY: ICD-10-CM

## 2025-03-12 RX ORDER — FERROUS SULFATE 325(65) MG
1 TABLET, DELAYED RELEASE (ENTERIC COATED) ORAL
Qty: 90 TABLET | Refills: 0 | Status: SHIPPED | OUTPATIENT
Start: 2025-03-12

## 2025-06-03 LAB
GFR, ESTIMATED: 68
HBA1C MFR BLD HPLC: 5.6 %

## 2025-06-05 ENCOUNTER — OFFICE VISIT (OUTPATIENT)
Age: 65
End: 2025-06-05
Payer: MEDICARE

## 2025-06-05 VITALS
HEART RATE: 76 BPM | HEIGHT: 73 IN | WEIGHT: 240.4 LBS | SYSTOLIC BLOOD PRESSURE: 112 MMHG | DIASTOLIC BLOOD PRESSURE: 74 MMHG | TEMPERATURE: 97.5 F | BODY MASS INDEX: 31.86 KG/M2 | RESPIRATION RATE: 15 BRPM | OXYGEN SATURATION: 95 %

## 2025-06-05 DIAGNOSIS — Z57.9 MILD INTERMITTENT OCCUPATIONAL ASTHMA WITHOUT COMPLICATION: ICD-10-CM

## 2025-06-05 DIAGNOSIS — Z00.00 WELCOME TO MEDICARE PREVENTIVE VISIT: Primary | ICD-10-CM

## 2025-06-05 DIAGNOSIS — B95.8 STAPH SKIN INFECTION: ICD-10-CM

## 2025-06-05 DIAGNOSIS — L08.9 STAPH SKIN INFECTION: ICD-10-CM

## 2025-06-05 DIAGNOSIS — E78.00 HYPERCHOLESTEROLEMIA: ICD-10-CM

## 2025-06-05 DIAGNOSIS — J45.20 MILD INTERMITTENT OCCUPATIONAL ASTHMA WITHOUT COMPLICATION: ICD-10-CM

## 2025-06-05 DIAGNOSIS — R73.03 PREDIABETES: ICD-10-CM

## 2025-06-05 DIAGNOSIS — M25.512 ACUTE PAIN OF LEFT SHOULDER: ICD-10-CM

## 2025-06-05 DIAGNOSIS — G60.3 IDIOPATHIC PROGRESSIVE POLYNEUROPATHY: ICD-10-CM

## 2025-06-05 DIAGNOSIS — Z12.5 PROSTATE CANCER SCREENING: ICD-10-CM

## 2025-06-05 PROCEDURE — 99214 OFFICE O/P EST MOD 30 MIN: CPT | Performed by: NURSE PRACTITIONER

## 2025-06-05 PROCEDURE — 93005 ELECTROCARDIOGRAM TRACING: CPT | Performed by: NURSE PRACTITIONER

## 2025-06-05 PROCEDURE — G0447 BEHAVIOR COUNSEL OBESITY 15M: HCPCS | Performed by: NURSE PRACTITIONER

## 2025-06-05 RX ORDER — ROSUVASTATIN CALCIUM 10 MG/1
10 TABLET, COATED ORAL NIGHTLY
Qty: 90 TABLET | Refills: 1 | Status: SHIPPED | OUTPATIENT
Start: 2025-06-05

## 2025-06-05 RX ORDER — CHLORHEXIDINE GLUCONATE 40 MG/ML
SOLUTION TOPICAL DAILY
Qty: 473 ML | Refills: 0 | Status: SHIPPED | OUTPATIENT
Start: 2025-06-05 | End: 2025-06-12

## 2025-06-05 RX ORDER — ALBUTEROL SULFATE 90 UG/1
2 INHALANT RESPIRATORY (INHALATION) EVERY 6 HOURS PRN
Qty: 18 EACH | Refills: 1 | Status: SHIPPED | OUTPATIENT
Start: 2025-06-05

## 2025-06-05 RX ORDER — MUPIROCIN 20 MG/G
OINTMENT TOPICAL 3 TIMES DAILY
Qty: 22 G | Refills: 1 | Status: SHIPPED | OUTPATIENT
Start: 2025-06-05 | End: 2025-06-12

## 2025-06-05 SDOH — ECONOMIC STABILITY: FOOD INSECURITY: WITHIN THE PAST 12 MONTHS, YOU WORRIED THAT YOUR FOOD WOULD RUN OUT BEFORE YOU GOT MONEY TO BUY MORE.: NEVER TRUE

## 2025-06-05 SDOH — ECONOMIC STABILITY: FOOD INSECURITY: WITHIN THE PAST 12 MONTHS, THE FOOD YOU BOUGHT JUST DIDN'T LAST AND YOU DIDN'T HAVE MONEY TO GET MORE.: NEVER TRUE

## 2025-06-05 SDOH — HEALTH STABILITY - PHYSICAL HEALTH: OCCUPATIONAL EXPOSURE TO UNSPECIFIED RISK FACTOR: Z57.9

## 2025-06-05 ASSESSMENT — LIFESTYLE VARIABLES
HOW OFTEN DO YOU HAVE A DRINK CONTAINING ALCOHOL: NEVER
HOW MANY STANDARD DRINKS CONTAINING ALCOHOL DO YOU HAVE ON A TYPICAL DAY: PATIENT DOES NOT DRINK

## 2025-06-05 ASSESSMENT — PATIENT HEALTH QUESTIONNAIRE - PHQ9
2. FEELING DOWN, DEPRESSED OR HOPELESS: NOT AT ALL
SUM OF ALL RESPONSES TO PHQ QUESTIONS 1-9: 0
SUM OF ALL RESPONSES TO PHQ QUESTIONS 1-9: 0
2. FEELING DOWN, DEPRESSED OR HOPELESS: NOT AT ALL
SUM OF ALL RESPONSES TO PHQ QUESTIONS 1-9: 0
1. LITTLE INTEREST OR PLEASURE IN DOING THINGS: NOT AT ALL
SUM OF ALL RESPONSES TO PHQ QUESTIONS 1-9: 0
SUM OF ALL RESPONSES TO PHQ QUESTIONS 1-9: 0
1. LITTLE INTEREST OR PLEASURE IN DOING THINGS: NOT AT ALL
SUM OF ALL RESPONSES TO PHQ QUESTIONS 1-9: 0

## 2025-06-05 ASSESSMENT — VISUAL ACUITY
OD_CC: 20/25
OS_CC: 20/40

## 2025-06-05 NOTE — PATIENT INSTRUCTIONS
a safe and effective exercise program.  A counselor or psychiatrist can help you cope with issues such as depression, anxiety, or family problems that can make it hard to focus on weight loss.  Consider joining a support group for people who are trying to lose weight. Your doctor can suggest groups in your area.  Where can you learn more?  Go to https://www.[employer].net/patientEd and enter U357 to learn more about \"Starting a Weight-Loss Plan: Care Instructions.\"  Current as of: April 30, 2024  Content Version: 14.5  © 0441-3307 [employer].   Care instructions adapted under license by [facility]. If you have questions about a medical condition or this instruction, always ask your healthcare professional. [employer] disclaims any warranty or liability for your use of this information.         Learning About Low-Carbohydrate Diets  What is a low-carbohydrate diet?     A low-carbohydrate (or \"low-carb\") diet limits foods and drinks that have carbohydrates. This includes grains, fruits, milk and yogurt, and starchy vegetables like potatoes, beans, and corn. It also avoids foods and drinks that have added sugar. Instead, low-carb diets include foods that are high in protein and fat.  Why might you follow a low-carb diet?  Low-carb diets may be used for a variety of reasons, such as for weight loss. People who have diabetes may use a low-carb diet to help manage their blood sugar levels.  What should you do before you start the diet?  Talk to your doctor before you try any diet. This is even more important if you have health problems like kidney disease, heart disease, or diabetes. Your doctor may suggest that you meet with a registered dietitian. A dietitian can help you make an eating plan that works for you.  What foods do you eat on a low-carb diet?  On a low-carb diet, you choose foods that are high in protein and fat. Examples of these are:  Meat, poultry, and fish.  Eggs.  Nuts, such as

## 2025-06-05 NOTE — PROGRESS NOTES
Chief Complaint   Patient presents with    Annual Exam     Physical          \"Have you been to the ER, urgent care clinic since your last visit?  Hospitalized since your last visit?\"    NO    “Have you seen or consulted any other health care providers outside of [facility] since your last visit?”    NO          Click Here for Release of Records Request           6/5/2025     1:56 PM   PHQ-9    Little interest or pleasure in doing things 0   Feeling down, depressed, or hopeless 0   PHQ-2 Score 0   PHQ-9 Total Score 0           Financial Resource Strain: Low Risk  (9/7/2023)    Overall Financial Resource Strain (CARDIA)     Difficulty of Paying Living Expenses: Not hard at all      Food Insecurity: No Food Insecurity (6/5/2025)    Hunger Vital Sign     Worried About Running Out of Food in the Last Year: Never true     Ran Out of Food in the Last Year: Never true          Health Maintenance Due   Topic Date Due    Shingles vaccine (1 of 2) Never done    Respiratory Syncytial Virus (RSV) Pregnant or age 60 yrs+ (1 - Risk 60-74 years 1-dose series) Never done    Pneumococcal 50+ years Vaccine (2 of 2 - PCV) 10/16/2020    COVID-19 Vaccine (1 - 2024-25 season) Never done    A1C test (Diabetic or Prediabetic)  05/23/2025    Lipids  05/23/2025    Depression Screen  05/23/2025        
Well Adult Note  Name: Nadir Louie Today’s Date: 2025   MRN: [id number] Sex: Male   Age: 65 y.o. Ethnicity: Non- / Non    : 1960 Race: White (non-)      Nadir Louie is here for a well adult exam.          Assessment & Plan      Encounter for well adult exam without abnormal findings  Results         No follow-ups on file.     Subjective   History of Present Illness        Review of Systems       Allergies   Allergen Reactions    Hydrocortisone Dermatitis     Reaction with eyes    Simvastatin Other (See Comments)     Bilateral leg cramps     Prior to Visit Medications    Medication Sig Taking? Authorizing Provider   NONFORMULARY EB-N6 supplement for neuropathy Yes ProviderSandy MD   albuterol sulfate HFA (PROVENTIL;VENTOLIN;PROAIR) 108 (90 Base) MCG/ACT inhaler INHALE 2 PUFFS BY MOUTH EVERY 4 HOURS AS NEEDED FOR WHEEZE Yes Debra Jean, APRN - NP   rosuvastatin (CRESTOR) 10 MG tablet Take 1 tablet by mouth nightly For cholesterol Yes Debra Jean, APRN - NP   gabapentin (NEURONTIN) 300 MG capsule TAKE 1 CAPSULE BY MOUTH EVERYDAY AT BEDTIME  Patient taking differently: 400 mg. Yes ProviderSandy MD   MULTIPLE VITAMIN PO Take 1 tablet by mouth daily Yes Provider, MD Sandy   CALCIUM ASCORBATE PO  Yes ProviderSandy MD   vitamin D3 (CHOLECALCIFEROL) 125 MCG (5000 UT) TABS tablet Take 1 tablet by mouth daily Yes Automatic Reconciliation, Ar   DULoxetine (CYMBALTA) 30 MG extended release capsule ceived the following from [facility]: Outside name: DULoxetine (CYMBALTA) 30 mg capsule Yes Automatic Reconciliation, Ar   loratadine (CLARITIN) 10 MG tablet 1 tab(s) Yes Automatic Reconciliation, Ar   meloxicam (MOBIC) 15 MG tablet 1 tab(s) Yes Automatic Reconciliation, Ar   ferrous sulfate (FE TABS 325) 325 (65 Fe) MG EC tablet TAKE 1 TABLET BY MOUTH EVERY DAY BEFORE BREAKFAST  Patient not taking: Reported on 2025  Debra Jean, APRN - 
     General: No focal deficit present.      Mental Status: He is alert and oriented to person, place, and time.                  Allergies   Allergen Reactions    Hydrocortisone Dermatitis     Reaction with eyes    Simvastatin Other (See Comments)     Bilateral leg cramps     Prior to Visit Medications    Medication Sig Taking? Authorizing Provider   chlorhexidine gluconate (HIBICLENS) 4 % SOLN external solution Apply topically daily for 7 days Yes Debra Jean APRN - NP   mupirocin (BACTROBAN) 2 % ointment Apply topically 3 times daily for 7 days Yes Debra Jean APRN - NP   rosuvastatin (CRESTOR) 10 MG tablet Take 1 tablet by mouth nightly For cholesterol Yes Debra Jean APRN - NP   albuterol sulfate HFA (PROVENTIL;VENTOLIN;PROAIR) 108 (90 Base) MCG/ACT inhaler Inhale 2 puffs into the lungs every 6 hours as needed for Wheezing Yes Debra Jean APRN - NP   NONFORMULARY EB-N6 supplement for neuropathy Yes ProviderSandy MD   gabapentin (NEURONTIN) 300 MG capsule TAKE 1 CAPSULE BY MOUTH EVERYDAY AT BEDTIME  Patient taking differently: 400 mg. Yes Provider, MD Sandy   MULTIPLE VITAMIN PO Take 1 tablet by mouth daily Yes Provider, MD Sandy   CALCIUM ASCORBATE PO  Yes Provider, MD Sandy   vitamin D3 (CHOLECALCIFEROL) 125 MCG (5000 UT) TABS tablet Take 1 tablet by mouth daily Yes Automatic Reconciliation, Ar   DULoxetine (CYMBALTA) 30 MG extended release capsule ceived the following from [facility]: Outside name: DULoxetine (CYMBALTA) 30 mg capsule Yes Automatic Reconciliation, Ar   loratadine (CLARITIN) 10 MG tablet 1 tab(s) Yes Automatic Reconciliation, Ar   meloxicam (MOBIC) 15 MG tablet 1 tab(s) Yes Automatic Reconciliation, Ar   ferrous sulfate (FE TABS 325) 325 (65 Fe) MG EC tablet TAKE 1 TABLET BY MOUTH EVERY DAY BEFORE BREAKFAST  Patient not taking: Reported on 6/5/2025  Debra Jean APRN - NP   Coenzyme Q10 10 MG CAPS Take 1 capsule by mouth

## 2025-06-06 ENCOUNTER — RESULTS FOLLOW-UP (OUTPATIENT)
Age: 65
End: 2025-06-06

## 2025-06-12 ENCOUNTER — HOSPITAL ENCOUNTER (OUTPATIENT)
Facility: HOSPITAL | Age: 65
Discharge: HOME | End: 2025-06-15
Payer: MEDICARE

## 2025-06-12 DIAGNOSIS — M25.512 ACUTE PAIN OF LEFT SHOULDER: ICD-10-CM

## 2025-06-12 DIAGNOSIS — E78.00 HYPERCHOLESTEROLEMIA: ICD-10-CM

## 2025-06-12 DIAGNOSIS — Z12.5 PROSTATE CANCER SCREENING: ICD-10-CM

## 2025-06-12 PROCEDURE — 73030 X-RAY EXAM OF SHOULDER: CPT

## 2025-06-14 LAB
CHOLEST SERPL-MCNC: 240 MG/DL
HDLC SERPL-MCNC: 51 MG/DL
HDLC SERPL: 4.7 (ref 0–5)
LDLC SERPL CALC-MCNC: 146.2 MG/DL (ref 0–100)
PSA SERPL-MCNC: 1.1 NG/ML (ref 0.01–4)
TRIGL SERPL-MCNC: 214 MG/DL
VLDLC SERPL CALC-MCNC: 42.8 MG/DL